# Patient Record
Sex: FEMALE | Race: WHITE | Employment: OTHER | URBAN - METROPOLITAN AREA
[De-identification: names, ages, dates, MRNs, and addresses within clinical notes are randomized per-mention and may not be internally consistent; named-entity substitution may affect disease eponyms.]

---

## 2017-01-24 ENCOUNTER — OFFICE VISIT (OUTPATIENT)
Dept: OBGYN | Facility: CLINIC | Age: 47
End: 2017-01-24
Payer: COMMERCIAL

## 2017-01-24 ENCOUNTER — RADIANT APPOINTMENT (OUTPATIENT)
Dept: MAMMOGRAPHY | Facility: CLINIC | Age: 47
End: 2017-01-24
Payer: COMMERCIAL

## 2017-01-24 VITALS
HEIGHT: 72 IN | BODY MASS INDEX: 28.04 KG/M2 | WEIGHT: 207 LBS | SYSTOLIC BLOOD PRESSURE: 122 MMHG | DIASTOLIC BLOOD PRESSURE: 74 MMHG

## 2017-01-24 DIAGNOSIS — Z12.31 VISIT FOR SCREENING MAMMOGRAM: ICD-10-CM

## 2017-01-24 DIAGNOSIS — Z01.419 ENCOUNTER FOR GYNECOLOGICAL EXAMINATION WITHOUT ABNORMAL FINDING: Primary | ICD-10-CM

## 2017-01-24 DIAGNOSIS — N92.0 MENORRHAGIA WITH REGULAR CYCLE: ICD-10-CM

## 2017-01-24 PROCEDURE — G0202 SCR MAMMO BI INCL CAD: HCPCS | Mod: TC

## 2017-01-24 PROCEDURE — 99396 PREV VISIT EST AGE 40-64: CPT | Performed by: NURSE PRACTITIONER

## 2017-01-24 PROCEDURE — G0145 SCR C/V CYTO,THINLAYER,RESCR: HCPCS | Performed by: NURSE PRACTITIONER

## 2017-01-24 ASSESSMENT — ANXIETY QUESTIONNAIRES
3. WORRYING TOO MUCH ABOUT DIFFERENT THINGS: NOT AT ALL
6. BECOMING EASILY ANNOYED OR IRRITABLE: NOT AT ALL
7. FEELING AFRAID AS IF SOMETHING AWFUL MIGHT HAPPEN: NOT AT ALL
GAD7 TOTAL SCORE: 0
IF YOU CHECKED OFF ANY PROBLEMS ON THIS QUESTIONNAIRE, HOW DIFFICULT HAVE THESE PROBLEMS MADE IT FOR YOU TO DO YOUR WORK, TAKE CARE OF THINGS AT HOME, OR GET ALONG WITH OTHER PEOPLE: NOT DIFFICULT AT ALL
1. FEELING NERVOUS, ANXIOUS, OR ON EDGE: NOT AT ALL
5. BEING SO RESTLESS THAT IT IS HARD TO SIT STILL: NOT AT ALL
2. NOT BEING ABLE TO STOP OR CONTROL WORRYING: NOT AT ALL

## 2017-01-24 ASSESSMENT — PATIENT HEALTH QUESTIONNAIRE - PHQ9: 5. POOR APPETITE OR OVEREATING: NOT AT ALL

## 2017-01-24 NOTE — PROGRESS NOTES
Antoinette is a 46 year [id number] female who presents for annual exam.     Besides routine health maintenance, she would like to discuss heavy periods which started in , at one point was on OCP for cycle regulation. And patient has been having more back pain, unsure if associated with periods.    HPI: here for annual exam, has had heavy  Periods since . Doesn't think she soaks a pad in less than hour, but can be as little as 2 hours.  Does not want to do OCP's.      The patient's PCP is Dr Erick Mckeon MD.    GYNECOLOGIC HISTORY:    Patient's last menstrual period was 01/15/2017.  Her current contraception method is: tubal ligation.  She  reports that she has never smoked. She has never used smokeless tobacco.  Patient is sexually active.  STD testing offered?  Declined    Last PHQ-9 score on record =   PHQ-9 SCORE 2017   Total Score 1     Last GAD7 score on record =   ARACELI-7 SCORE 2017   Total Score 0     Alcohol Score = 2    HEALTH MAINTENANCE:  Cholesterol: followed by primary care provider  CHOLESTEROL   Date Value Ref Range Status   06/10/2015 135 100 - 200 mg/dL Final   Last Mammo: 2015, Result: normal, Next Mammo: will try to schedule today  Pap: Negative 2015  Colonoscopy:  N/A, Result: not applicable, Next Colonoscopy: age 49  Dexa:  Never  Health maintenance updated:  yes    HISTORY:  Obstetric History     No data available          Patient Active Problem List   Diagnosis     Hypothyroidism     Migraine with aura     Past Surgical History   Procedure Laterality Date     [place]     [facility] [place]      Social History   Substance Use Topics     Smoking status: Never Smoker      Smokeless tobacco: Never Used     Alcohol Use: 0.0 oz/week     0 Standard drinks or equivalent per week      Problem (# of Occurrences) Relation (Name,Age of Onset)    Hypertension (1) Father    Type 2 Diabetes (1) Father            Current Outpatient Prescriptions   Medication Sig      "promethazine (PHENERGAN) 25 MG tablet Take 25 mg by mouth every 6 hours as needed for nausea     Levothyroxine Sodium (SYNTHROID PO) Take 12.5 mcg by mouth     Liothyronine Sodium (CYTOMEL PO) Take 5 mcg by mouth Take two tablets daily     Ascorbic Acid (VITAMIN C) 500 MG CAPS Take 1,000 mg by mouth     multivitamin, therapeutic with minerals (MULTI-VITAMIN) TABS      cholecalciferol (VITAMIN D) 1000 UNIT tablet Take 1,000 Units by mouth     SUMAtriptan (IMITREX) 25 MG tablet Take 1-2 tablets (25-50 mg) by mouth at onset of headache for migraine May repeat in 2 hours. Max 8 tablets/24 hours.     No current facility-administered medications for this visit.     No Known Allergies    Past medical, surgical, social and family histories were reviewed and updated in EPIC.    ROS:   12 point review of systems negative other than symptoms noted below.  Genitourinary: Heavy Bleeding with Period    EXAM:  /74 mmHg  Ht 5' 11.5\" (1.816 m)  Wt 207 lb (93.895 kg)  BMI 28.47 kg/m2  LMP 01/15/2017   BMI: Body mass index is 28.47 kg/(m^2).    PHYSICAL EXAM:  Constitutional:  Appearance: Well nourished, well developed, alert, in no acute distress  Neck:  Lymph Nodes:  No lymphadenopathy present    Thyroid:  Gland size normal, nontender, no nodules or masses present  on palpation  Chest:  Respiratory Effort:  Breathing unlabored  Cardiovascular:    Heart: Auscultation:  Regular rate, normal rhythm, no murmurs present  Breasts: Inspection of Breasts:  No lymphadenopathy present    Palpation of Breasts and Axillae:  No masses present on palpation, no  breast tenderness    Axillary Lymph Nodes:  No lymphadenopathy present  Gastrointestinal:   Abdominal Examination:  Abdomen nontender to palpation, tone normal without rigidity or guarding, no masses present, umbilicus without lesions   Liver and Spleen:  No hepatomegaly present, liver nontender to palpation    Hernias:  No hernias present  Lymphatic: Lymph Nodes:  No other " lymphadenopathy present  Skin:  General Inspection:  No rashes present, no lesions present, no areas of  discoloration    Genitalia and Groin:  No rashes present, no lesions present, no areas of  discoloration, no masses present  Neurologic/Psychiatric:    Mental Status:  Oriented X3     Pelvic Exam:  External Genitalia:     Normal appearance for age, no discharge present, no tenderness present, no inflammatory lesions present, color normal  Vagina:     Normal vaginal vault without central or paravaginal defects, no discharge present, no inflammatory lesions present, no masses present  Bladder:     Nontender to palpation  Urethra:   Urethral Body:  Urethra palpation normal, urethra structural support normal   Urethral Meatus:  No erythema or lesions present  Cervix:     Appearance healthy, no lesions present, nontender to palpation, no bleeding present  Uterus:     Nontender to palpation, no masses present, position mid, mobility: normal  Adnexa:     No adnexal tenderness present, no adnexal masses present  Perineum:     Perineum within normal limits, no evidence of trauma, no rashes or skin lesions present  Anus:     Anus within normal limits, no hemorrhoids present  Inguinal Lymph Nodes:     No lymphadenopathy present  Pubic Hair:     Normal pubic hair distribution for age  Genitalia and Groin:     No rashes present, no lesions present, no areas of discoloration, no masses present    COUNSELING:   Reviewed preventive health counseling, as reflected in patient instructions       Regular exercise       Healthy diet/nutrition       (Suni)menopause management    BMI: Body mass index is 28.47 kg/(m^2).     We discussed novasure ablation vs. OCP's for heavy bleeding.  Patient was very interested.  Phamplet given.      ASSESSMENT:  46 year old female with satisfactory annual exam.    ICD-10-CM    1. Encounter for gynecological examination without abnormal finding [Z01.419] Z01.419 Pap imaged thin layer screen reflex to  HPV if ASCUS - recommended age 25 - 29 years   2. Menorrhagia with regular cycle N92.0 US Transvaginal Non OB       PLAN:  Normal Gyn exam.  Will return for pelvic US and endo bx with Dr. Locke, if decides to do Novasure ablation.  Blood work with primary.    PHILOMENA Borjas CNP

## 2017-01-24 NOTE — MR AVS SNAPSHOT
After Visit Summary   1/24/2017    Antoinette Deleon    MRN: [id number]           Patient Information     Date Of Birth          1970        Visit Information        Provider Department      1/24/2017 9:00 AM Bertha Nunez APRN CNP [facility]        Today's Diagnoses     Encounter for gynecological examination without abnormal finding [Z01.419]    -  1     Menorrhagia with regular cycle            Follow-ups after your visit        Follow-up notes from your care team     Return in about 1 year (around 1/24/2018) for Routine Visit.      Future tests that were ordered for you today     Open Future Orders        Priority Expected Expires Ordered    US Transvaginal Non OB Routine  1/25/2018 1/24/2017            Who to contact     If you have questions or need follow up information about today's clinic visit or your schedule please contact [facility] directly at [phone].  Normal or non-critical lab and imaging results will be communicated to you by MyChart, letter or phone within 4 business days after the clinic has received the results. If you do not hear from us within 7 days, please contact the clinic through [employer] or phone. If you have a critical or abnormal lab result, we will notify you by phone as soon as possible.  Submit refill requests through Chromatik or call your pharmacy and they will forward the refill request to us. Please allow 3 business days for your refill to be completed.          Additional Information About Your Visit        MyChart Information     Chromatik gives you secure access to your electronic health record. If you see a primary care provider, you can also send messages to your care team and make appointments. If you have questions, please call your primary care clinic.  If you do not have a primary care provider, please call [phone] and they will assist you.        Care EveryWhere ID     This is your Care  "EveryWhere ID. This could be used by other organizations to access your [place] medical records  [id number]        Your Vitals Were     Height BMI (Body Mass Index) Last Period             5' 11.5\" (1.816 m) 28.47 kg/m2 01/15/2017          Blood Pressure from Last 3 Encounters:   01/24/17 122/74   09/26/16 126/80    Weight from Last 3 Encounters:   01/24/17 207 lb (93.895 kg)   09/26/16 203 lb (92.08 kg)              We Performed the Following     Pap imaged thin layer screen reflex to HPV if ASCUS - recommended age 25 - 29 years        Primary Care Provider Office Phone # Fax #    Erick Mckeon [phone]       [facility] [address] [facility] [address]  [facility] [address]        Thank you!     Thank you for choosing [facility]  for your care. Our goal is always to provide you with excellent care. Hearing back from our patients is one way we can continue to improve our services. Please take a few minutes to complete the written survey that you may receive in the mail after your visit with us. Thank you!             Your Updated Medication List - Protect others around you: Learn how to safely use, store and throw away your medicines at www.disposemymeds.org.          This list is accurate as of: 1/24/17  9:39 AM.  Always use your most recent med list.                   Brand Name Dispense Instructions for use    cholecalciferol 1000 UNIT tablet    vitamin D     Take 1,000 Units by mouth       CYTOMEL PO      Take 5 mcg by mouth Take two tablets daily       Multi-vitamin Tabs tablet          promethazine 25 MG tablet    PHENERGAN     Take 25 mg by mouth every 6 hours as needed for nausea       SUMAtriptan 25 MG tablet    IMITREX    18 tablet    Take 1-2 tablets (25-50 mg) by mouth at onset of headache for migraine May repeat in 2 hours. Max 8 tablets/24 hours.       SYNTHROID PO      Take 12.5 mcg by mouth       Vitamin C 500 MG Caps      Take 1,000 mg by mouth    "

## 2017-01-24 NOTE — Clinical Note
[facility]  [address] [place] [address] , [address]  [place]   [phone]      1/30/2017     Antoinette Deleon   [address]   [place] [address]      Dear Antoinette,  We are happy to inform you that your PAP smear result is normal.  We are now able to do a follow up test on PAP smears. The DNA test is for HPV (Human Papilloma Virus). Cervical cancer is closely linked with certain types of HPV. Your result showed no evidence of HPV.  Therefore we recommend you return in 1 year for your next pap smear.  You will still need to return to the clinic every year for an annual exam and other preventive tests.  Please contact the clinic with any questions.  Sincerely,  Elissa Nunez RNC

## 2017-01-25 ASSESSMENT — PATIENT HEALTH QUESTIONNAIRE - PHQ9: SUM OF ALL RESPONSES TO PHQ QUESTIONS 1-9: 1

## 2017-01-25 ASSESSMENT — ANXIETY QUESTIONNAIRES: GAD7 TOTAL SCORE: 0

## 2017-01-26 LAB
COPATH REPORT: NORMAL
PAP: NORMAL

## 2017-02-17 ENCOUNTER — TELEPHONE (OUTPATIENT)
Dept: FAMILY MEDICINE | Facility: CLINIC | Age: 47
End: 2017-02-17

## 2017-02-17 NOTE — TELEPHONE ENCOUNTER
Pharmacy stating PA is needed for the sumatriptan. Formulary shows this medication is approved for up to 9 tablets per 30 days. I called [employer] to review the rejection, and they note that the medication should go through just fine at 9 tablets per 30 days but that there is a coding issue they need to fix on their end for the sumatriptan to go through at the pharmacy. Anyway, [employer] said they will be working on the coding issue and will then call the pharmacy to let them know when it can be processed through without issues.    No further action needed by clinic.    Luis F Garcia, [facility]

## 2017-04-07 ENCOUNTER — MYC MEDICAL ADVICE (OUTPATIENT)
Dept: OBGYN | Facility: CLINIC | Age: 47
End: 2017-04-07

## 2017-04-10 NOTE — TELEPHONE ENCOUNTER
I sent a message back to patient in [facility] with follow up concerning abation.  LUIS A Giles

## 2017-06-05 ENCOUNTER — RADIANT APPOINTMENT (OUTPATIENT)
Dept: GENERAL RADIOLOGY | Facility: CLINIC | Age: 47
End: 2017-06-05
Attending: FAMILY MEDICINE
Payer: COMMERCIAL

## 2017-06-05 ENCOUNTER — OFFICE VISIT (OUTPATIENT)
Dept: URGENT CARE | Facility: URGENT CARE | Age: 47
End: 2017-06-05
Payer: COMMERCIAL

## 2017-06-05 VITALS
DIASTOLIC BLOOD PRESSURE: 73 MMHG | HEART RATE: 87 BPM | WEIGHT: 205 LBS | BODY MASS INDEX: 28.19 KG/M2 | TEMPERATURE: 97 F | SYSTOLIC BLOOD PRESSURE: 117 MMHG | OXYGEN SATURATION: 96 %

## 2017-06-05 DIAGNOSIS — M25.571 ACUTE RIGHT ANKLE PAIN: ICD-10-CM

## 2017-06-05 DIAGNOSIS — M25.571 ACUTE RIGHT ANKLE PAIN: Primary | ICD-10-CM

## 2017-06-05 PROCEDURE — 99213 OFFICE O/P EST LOW 20 MIN: CPT | Performed by: FAMILY MEDICINE

## 2017-06-05 PROCEDURE — 73610 X-RAY EXAM OF ANKLE: CPT | Mod: RT

## 2017-06-05 NOTE — MR AVS SNAPSHOT
After Visit Summary   6/5/2017    Antoinette Deleon    MRN: [id number]           Patient Information     Date Of Birth          1970        Visit Information        Provider Department      6/5/2017 5:00 PM Juan Mtz MD [facility]        Today's Diagnoses     Acute right ankle pain    -  1       Follow-ups after your visit        Who to contact     If you have questions or need follow up information about today's clinic visit or your schedule please contact [facility] directly at [phone].  Normal or non-critical lab and imaging results will be communicated to you by [employer], letter or phone within 4 business days after the clinic has received the results. If you do not hear from us within 7 days, please contact the clinic through [employer] or phone. If you have a critical or abnormal lab result, we will notify you by phone as soon as possible.  Submit refill requests through [employer]i Connect or call your pharmacy and they will forward the refill request to us. Please allow 3 business days for your refill to be completed.          Additional Information About Your Visit        MyChart Information     [employer] Connect gives you secure access to your electronic health record. If you see a primary care provider, you can also send messages to your care team and make appointments. If you have questions, please call your primary care clinic.  If you do not have a primary care provider, please call [phone] and they will assist you.        Care EveryWhere ID     This is your Care EveryWhere ID. This could be used by other organizations to access your [place] medical records  [id number]        Your Vitals Were     Pulse Temperature Pulse Oximetry BMI (Body Mass Index)          87 97  F (36.1  C) (Oral) 96% 28.19 kg/m2         Blood Pressure from Last 3 Encounters:   06/05/17 117/73   01/24/17 122/74   09/26/16 126/80    Weight from Last 3 Encounters:    06/05/17 205 lb (93 kg)   01/24/17 207 lb (93.9 kg)   09/26/16 203 lb (92.1 kg)               Primary Care Provider Office Phone # Fax #    Erick Mckeon [phone]       [facility] [address] [place] [address]  [facility] [address]        Thank you!     Thank you for choosing [facility]  for your care. Our goal is always to provide you with excellent care. Hearing back from our patients is one way we can continue to improve our services. Please take a few minutes to complete the written survey that you may receive in the mail after your visit with us. Thank you!             Your Updated Medication List - Protect others around you: Learn how to safely use, store and throw away your medicines at www.disposemymeds.org.          This list is accurate as of: 6/5/17 11:59 PM.  Always use your most recent med list.                   Brand Name Dispense Instructions for use    cholecalciferol 1000 UNIT tablet    vitamin D     Take 1,000 Units by mouth       CYTOMEL PO      Take 5 mcg by mouth Take two tablets daily       Multi-vitamin Tabs tablet          promethazine 25 MG tablet    PHENERGAN     Take 25 mg by mouth every 6 hours as needed for nausea       SUMAtriptan 25 MG tablet    IMITREX    18 tablet    Take 1-2 tablets (25-50 mg) by mouth at onset of headache for migraine May repeat in 2 hours. Max 8 tablets/24 hours.       SYNTHROID PO      Take 12.5 mcg by mouth       Vitamin C 500 MG Caps      Take 1,000 mg by mouth

## 2017-06-05 NOTE — PROGRESS NOTES
SUBJECTIVE  Antoinette Deleon is a 46 year old female who presents today with right ankle pain that occurred 1 week(s) ago.    The mechanism of injury includes: twisted. Pain was sudden onset and moderate  Therapies to improve symptoms include: ice  History of recurrent ankle injuries: no  Pain is improved since onset.  Aggravating factors: weight-bearing, stairs and driving, Relieved byrest and ice.    No past medical history on file.  Current Outpatient Prescriptions   Medication Sig Dispense Refill     Levothyroxine Sodium (SYNTHROID PO) Take 12.5 mcg by mouth       Liothyronine Sodium (CYTOMEL PO) Take 5 mcg by mouth Take two tablets daily       Ascorbic Acid (VITAMIN C) 500 MG CAPS Take 1,000 mg by mouth       multivitamin, therapeutic with minerals (MULTI-VITAMIN) TABS        cholecalciferol (VITAMIN D) 1000 UNIT tablet Take 1,000 Units by mouth       promethazine (PHENERGAN) 25 MG tablet Take 25 mg by mouth every 6 hours as needed for nausea       SUMAtriptan (IMITREX) 25 MG tablet Take 1-2 tablets (25-50 mg) by mouth at onset of headache for migraine May repeat in 2 hours. Max 8 tablets/24 hours. (Patient not taking: Reported on 6/5/2017) 18 tablet 11     Social History   Substance Use Topics     Smoking status: Never Smoker     Smokeless tobacco: Never Used     Alcohol use 0.0 oz/week     0 Standard drinks or equivalent per week       ROS:  CONSTITUTIONAL:NEGATIVE for fever, chills, change in weight  INTEGUMENTARY/SKIN: NEGATIVE for worrisome rashes, moles or lesions    OBJECTIVE:  /73  Pulse 87  Temp 97  F (36.1  C) (Oral)  Wt 205 lb (93 kg)  SpO2 96%  BMI 28.19 kg/m2     EXAM: Patient appears alert,no apparent distress.  Ankle Exam: right    Inspection: swelling around the lateral malleolus    Palpation: tender over lateral malleolus    Both doralis pedis and posterior tibial pulses intact     Special Maneuvers: Pain with inversion  Neuro:Normal strength and tone, sensory exam grossly  normal    X-Ray: offical reading pending,normal mortise, no loose bodies, no fractures seen    ASSESSMENT:    1. Acute right ankle pain  Symptomatic cares were discussed in detail.   Pt instructed to come back to the clinic for worsening sx    - XR Ankle Right G/E 3 Views; Future

## 2017-06-05 NOTE — NURSING NOTE
"Chief Complaint   Patient presents with     Urgent Care     Musculoskeletal Problem     right ankle injury last wednesday/swollen,bruised,driving painful        Initial /73  Pulse 87  Temp 97  F (36.1  C) (Oral)  Wt 205 lb (93 kg)  SpO2 96%  BMI 28.19 kg/m2 Estimated body mass index is 28.19 kg/(m^2) as calculated from the following:    Height as of 1/24/17: 5' 11.5\" (1.816 m).    Weight as of this encounter: 205 lb (93 kg).  Medication Reconciliation: complete   Neha LYONS MA       "

## 2017-10-30 ENCOUNTER — OFFICE VISIT (OUTPATIENT)
Dept: OBGYN | Facility: CLINIC | Age: 47
End: 2017-10-30
Attending: NURSE PRACTITIONER
Payer: COMMERCIAL

## 2017-10-30 ENCOUNTER — RADIANT APPOINTMENT (OUTPATIENT)
Dept: ULTRASOUND IMAGING | Facility: CLINIC | Age: 47
End: 2017-10-30
Attending: NURSE PRACTITIONER
Payer: COMMERCIAL

## 2017-10-30 DIAGNOSIS — N92.0 MENORRHAGIA WITH REGULAR CYCLE: ICD-10-CM

## 2017-10-30 DIAGNOSIS — D25.1 INTRAMURAL LEIOMYOMA OF UTERUS: ICD-10-CM

## 2017-10-30 DIAGNOSIS — N92.0 MENORRHAGIA WITH REGULAR CYCLE: Primary | ICD-10-CM

## 2017-10-30 PROCEDURE — 88305 TISSUE EXAM BY PATHOLOGIST: CPT | Performed by: OBSTETRICS & GYNECOLOGY

## 2017-10-30 PROCEDURE — 58558 HYSTEROSCOPY BIOPSY: CPT | Performed by: OBSTETRICS & GYNECOLOGY

## 2017-10-30 PROCEDURE — 76830 TRANSVAGINAL US NON-OB: CPT | Performed by: OBSTETRICS & GYNECOLOGY

## 2017-10-30 NOTE — MR AVS SNAPSHOT
After Visit Summary   10/30/2017    Antoinette Deleon    MRN: [id number]           Patient Information     Date Of Birth          1970        Visit Information        Provider Department      10/30/2017 2:00 PM Bertha Nunez APRN CNP; Josias Locke MD; [facility] [place]        Today's Diagnoses     Menorrhagia with regular cycle    -  1    Intramural leiomyoma of uterus           Follow-ups after your visit        Who to contact     If you have questions or need follow up information about today's clinic visit or your schedule please contact [facility] [place] directly at [phone].  Normal or non-critical lab and imaging results will be communicated to you by MyChart, letter or phone within 4 business days after the clinic has received the results. If you do not hear from us within 7 days, please contact the clinic through [employer]hart or phone. If you have a critical or abnormal lab result, we will notify you by phone as soon as possible.  Submit refill requests through TeeBeeDee or call your pharmacy and they will forward the refill request to us. Please allow 3 business days for your refill to be completed.          Additional Information About Your Visit        MyChart Information     TeeBeeDee gives you secure access to your electronic health record. If you see a primary care provider, you can also send messages to your care team and make appointments. If you have questions, please call your primary care clinic.  If you do not have a primary care provider, please call [phone] and they will assist you.        Care EveryWhere ID     This is your Care EveryWhere ID. This could be used by other organizations to access your [place] medical records  [id number]         Blood Pressure from Last 3 Encounters:   06/05/17 117/73   01/24/17 122/74   09/26/16 126/80    Weight from Last 3 Encounters:   06/05/17 205 lb (93 kg)   01/24/17 207  lb (93.9 kg)   09/26/16 203 lb (92.1 kg)              We Performed the Following     HYSTEROSCOPY W ENDOMETRIAL BX/POLYPECTOMY W/WO D&C     Surgical pathology exam        Primary Care Provider Office Phone # Fax #    Erick Mckeon [phone] [address] [place]  [place] [address]        Equal Access to Services     [facility] Center[facility]ICOLE : Hadii aad ku hadasho Soomaali, waaxda luqadaha, qaybta kaalmada adeegyada, waxay idiin hayaan adeeg kharash la'aan . So [facility] [phone].    ATENCIÓN: Si kathyla eyad, tiene a salcedo disposición servicios gratuitos de asistencia lingüística. Bethanieame al [phone].    We comply with applicable federal civil rights laws and [place] laws. We do not discriminate on the basis of race, color, national origin, age, disability, sex, sexual orientation, or gender identity.            Thank you!     Thank you for choosing [facility] [place]  for your care. Our goal is always to provide you with excellent care. Hearing back from our patients is one way we can continue to improve our services. Please take a few minutes to complete the written survey that you may receive in the mail after your visit with us. Thank you!             Your Updated Medication List - Protect others around you: Learn how to safely use, store and throw away your medicines at www.disposemymeds.org.          This list is accurate as of: 10/30/17  3:05 PM.  Always use your most recent med list.                   Brand Name Dispense Instructions for use Diagnosis    cholecalciferol 1000 UNIT tablet    vitamin D3     Take 1,000 Units by mouth        CYTOMEL PO      Take 5 mcg by mouth Take two tablets daily        Multi-vitamin Tabs tablet           promethazine 25 MG tablet    PHENERGAN     Take 25 mg by mouth every 6 hours as needed for nausea        SUMAtriptan 25 MG tablet    IMITREX    18 tablet    Take 1-2 tablets (25-50 mg) by mouth at onset of headache for migraine May  repeat in 2 hours. Max 8 tablets/24 hours.    Migraine with aura and without status migrainosus, not intractable       SYNTHROID PO      Take 12.5 mcg by mouth        Vitamin C 500 MG Caps      Take 1,000 mg by mouth

## 2017-10-30 NOTE — PROGRESS NOTES
Antoinette is here for evaluation of menorrhagia.  She states that she is always had very heavy menses.  She is interested in an endometrial ablation.  Her ultrasound exam today did show a 3 cm anterior cervical myoma, which was not impinging on the cavity.  The uterus is retroflexed.  The hysteroscopy procedure has been discussed with the patient as well as the risks, benefits and complications. She agrees to the procedure. Consent form signed.    Results for orders placed or performed in visit on 10/30/17   US Transvaginal Non OB    Narrative    Gynecological Ultrasonography:   Uterus: retroverted  Size: 7.08 x 5.77 x 5.71cm  Findings: Single subserosal fibroid at cervical area measuring 2.5 x   3.0cm. Uterus appears otherwise normal   Endometrium: Thickness total 13.67mm  Findings: Appears normal  Right Ovary: 3.17 x 2.07 x 2.80cm   Left Ovary: 4.53 x 2.74 x 2.51cm  Cul de Sac/Pouch of Indra: no ff      Impression: Subserosal 3,0 cm anterior cervical myoma    Josias Locke MD            Pause for the cause completed. Josias Locke    Patient was placed in lithotomy position.  Cervix was dilated to 4mm, the hysterocope was then placed and the uterine cavity explored.     Findings include the uterine cavity was smooth and regular.  Both tubal ostia were well seen.  The uterus sounded to 8 cm.  On examining the endocervix everything looked normal.  There is no evidence of a myoma that was seen and endocervical side.  A myoma cannot be palpated on the anterior cervix on bimanual exam.  Biopsies endometrial.     Antoinette  tolerated the procedure well.     Patient be called to schedule an office endometrial ablation when we have her next in office schedule.    Josias Locke MD

## 2017-11-01 DIAGNOSIS — N88.2 CERVICAL STENOSIS (UTERINE CERVIX): Primary | ICD-10-CM

## 2017-11-01 LAB — COPATH REPORT: NORMAL

## 2017-11-01 RX ORDER — MISOPROSTOL 200 UG/1
200 TABLET ORAL ONCE
Qty: 1 TABLET | Refills: 0 | Status: SHIPPED | OUTPATIENT
Start: 2017-11-01 | End: 2017-11-01

## 2017-11-06 ENCOUNTER — OFFICE VISIT (OUTPATIENT)
Dept: FAMILY MEDICINE | Facility: CLINIC | Age: 47
End: 2017-11-06
Payer: COMMERCIAL

## 2017-11-06 VITALS
HEIGHT: 72 IN | OXYGEN SATURATION: 99 % | DIASTOLIC BLOOD PRESSURE: 74 MMHG | WEIGHT: 203 LBS | RESPIRATION RATE: 16 BRPM | TEMPERATURE: 98.9 F | BODY MASS INDEX: 27.5 KG/M2 | SYSTOLIC BLOOD PRESSURE: 118 MMHG | HEART RATE: 88 BPM

## 2017-11-06 DIAGNOSIS — J20.9 ACUTE BRONCHITIS, UNSPECIFIED ORGANISM: Primary | ICD-10-CM

## 2017-11-06 DIAGNOSIS — Z23 NEED FOR PROPHYLACTIC VACCINATION AND INOCULATION AGAINST INFLUENZA: ICD-10-CM

## 2017-11-06 PROCEDURE — 99213 OFFICE O/P EST LOW 20 MIN: CPT | Mod: 25 | Performed by: INTERNAL MEDICINE

## 2017-11-06 PROCEDURE — 90471 IMMUNIZATION ADMIN: CPT | Performed by: INTERNAL MEDICINE

## 2017-11-06 PROCEDURE — 90686 IIV4 VACC NO PRSV 0.5 ML IM: CPT | Performed by: INTERNAL MEDICINE

## 2017-11-06 RX ORDER — ALBUTEROL SULFATE 90 UG/1
2 AEROSOL, METERED RESPIRATORY (INHALATION) EVERY 6 HOURS PRN
Qty: 1 INHALER | Refills: 1 | Status: SHIPPED | OUTPATIENT
Start: 2017-11-06 | End: 2019-07-29

## 2017-11-06 RX ORDER — CODEINE PHOSPHATE AND GUAIFENESIN 10; 100 MG/5ML; MG/5ML
1 SOLUTION ORAL EVERY 4 HOURS PRN
Qty: 120 ML | Refills: 0 | Status: SHIPPED | OUTPATIENT
Start: 2017-11-06 | End: 2018-01-09

## 2017-11-06 RX ORDER — PREDNISONE 20 MG/1
20 TABLET ORAL DAILY
Qty: 5 TABLET | Refills: 0 | Status: SHIPPED | OUTPATIENT
Start: 2017-11-06 | End: 2018-01-09

## 2017-11-06 NOTE — MR AVS SNAPSHOT
"              After Visit Summary   11/6/2017    Antoinette Deleon    MRN: [id number]           Patient Information     Date Of Birth          1970        Visit Information        Provider Department      11/6/2017 10:20 AM Cindi Cho MD [facility] [place]        Today's Diagnoses     Acute bronchitis, unspecified organism    -  1    Need for prophylactic vaccination and inoculation against influenza           Follow-ups after your visit        Who to contact     If you have questions or need follow up information about today's clinic visit or your schedule please contact [facility] [place] directly at [phone].  Normal or non-critical lab and imaging results will be communicated to you by [employer], letter or phone within 4 business days after the clinic has received the results. If you do not hear from us within 7 days, please contact the clinic through [employer] or phone. If you have a critical or abnormal lab result, we will notify you by phone as soon as possible.  Submit refill requests through flux - [employer]inity or call your pharmacy and they will forward the refill request to us. Please allow 3 business days for your refill to be completed.          Additional Information About Your Visit        MyChart Information     [employer] gives you secure access to your electronic health record. If you see a primary care provider, you can also send messages to your care team and make appointments. If you have questions, please call your primary care clinic.  If you do not have a primary care provider, please call [phone] and they will assist you.        Care EveryWhere ID     This is your Care EveryWhere ID. This could be used by other organizations to access your [place] medical records  [id number]        Your Vitals Were     Pulse Temperature Respirations Height Pulse Oximetry BMI (Body Mass Index)    88 98.9  F (37.2  C) 16 5' 11.5\" (1.816 m) 99% 27.92 kg/m2       Blood Pressure " from Last 3 Encounters:   11/06/17 118/74   06/05/17 117/73   01/24/17 122/74    Weight from Last 3 Encounters:   11/06/17 203 lb (92.1 kg)   06/05/17 205 lb (93 kg)   01/24/17 207 lb (93.9 kg)              We Performed the Following     FLU VAC, SPLIT VIRUS IM > 3 YO (QUADRIVALENT) [62855]     Vaccine Administration, Initial [17480]          Today's Medication Changes          These changes are accurate as of: 11/6/17 10:56 AM.  If you have any questions, ask your nurse or doctor.               Start taking these medicines.        Dose/Directions    albuterol 108 (90 BASE) MCG/ACT Inhaler   Commonly known as:  PROAIR HFA/PROVENTIL HFA/VENTOLIN HFA   Used for:  Acute bronchitis, unspecified organism   Started by:  Cindi Cho MD        Dose:  2 puff   Inhale 2 puffs into the lungs every 6 hours as needed for shortness of breath / dyspnea or wheezing   Quantity:  1 Inhaler   Refills:  1       guaiFENesin-codeine 100-10 MG/5ML Soln solution   Commonly known as:  ROBITUSSIN AC   Used for:  Acute bronchitis, unspecified organism   Started by:  Cindi Cho MD        Dose:  1 tsp.   Take 5 mLs by mouth every 4 hours as needed for cough   Quantity:  120 mL   Refills:  0       predniSONE 20 MG tablet   Commonly known as:  DELTASONE   Used for:  Acute bronchitis, unspecified organism   Started by:  Cindi Cho MD        Dose:  20 mg   Take 1 tablet (20 mg) by mouth daily   Quantity:  5 tablet   Refills:  0            Where to get your medicines      These medications were sent to [facility] [place]  [place], [place] - [address] [place] [address]  [address] [facility] [address]     Phone:  [phone]     albuterol 108 (90 BASE) MCG/ACT Inhaler    predniSONE 20 MG tablet         Some of these will need a paper prescription and others can be bought over the counter.  Ask your nurse if you have questions.     Bring a paper prescription for each of these medications      guaiFENesin-codeine 100-10 MG/5ML Soln solution                Primary Care Provider Office Phone # Fax #    Erick Mckeon [phone] [address]  [facility] [address]        Equal Access to Services     [facility]: Hadii aad ku hadasho Soomaali, waaxda luqadaha, qaybta kaalmada adeegyada, waxay idiin hayaan adeeg leah laYulissaaan . So [facility] [phone].    ATENCIÓN: Si habla español, tiene a salcedo disposición servicios gratuitos de asistencia lingüística. Yessenia al [phone].    We comply with applicable federal civil rights laws and [place] laws. We do not discriminate on the basis of race, color, national origin, age, disability, sex, sexual orientation, or gender identity.            Thank you!     Thank you for [facility]  [facility] your care. Our goal is always to provide you with excellent care. Hearing back from our patients is one way we can continue to improve our services. Please take a few minutes to complete the written survey that you may receive in the mail after your visit with us. Thank you!             Your Updated Medication List - Protect others around you: Learn how to safely use, store and throw away your medicines at www.disposemymeds.org.          This list is accurate as of: 11/6/17 10:56 AM.  Always use your most recent med list.                   Brand Name Dispense Instructions for use Diagnosis    albuterol 108 (90 BASE) MCG/ACT Inhaler    PROAIR HFA/PROVENTIL HFA/VENTOLIN HFA    1 Inhaler    Inhale 2 puffs into the lungs every 6 hours as needed for shortness of breath / dyspnea or wheezing    Acute bronchitis, unspecified organism       cholecalciferol 1000 UNIT tablet    vitamin D3     Take 1,000 Units by mouth        CYTOMEL PO      Take 5 mcg by mouth Take two tablets daily        guaiFENesin-codeine 100-10 MG/5ML Soln solution    ROBITUSSIN AC    120 mL    Take 5 mLs by mouth every 4 hours as needed for cough     Acute bronchitis, unspecified organism       Multi-vitamin Tabs tablet           predniSONE 20 MG tablet    DELTASONE    5 tablet    Take 1 tablet (20 mg) by mouth daily    Acute bronchitis, unspecified organism       promethazine 25 MG tablet    PHENERGAN     Take 25 mg by mouth every 6 hours as needed for nausea        SUMAtriptan 25 MG tablet    IMITREX    18 tablet    Take 1-2 tablets (25-50 mg) by mouth at onset of headache for migraine May repeat in 2 hours. Max 8 tablets/24 hours.    Migraine with aura and without status migrainosus, not intractable       SYNTHROID PO      Take 12.5 mcg by mouth        Vitamin C 500 MG Caps      Take 1,000 mg by mouth

## 2017-11-06 NOTE — PROGRESS NOTES

## 2017-11-06 NOTE — PROGRESS NOTES
"Chief Complaint   Patient presents with     URI       Initial /74  Pulse 88  Temp 98.9  F (37.2  C)  Resp 16  Ht 5' 11.5\" (1.816 m)  Wt 203 lb (92.1 kg)  SpO2 99%  BMI 27.92 kg/m2 Estimated body mass index is 27.92 kg/(m^2) as calculated from the following:    Height as of this encounter: 5' 11.5\" (1.816 m).    Weight as of this encounter: 203 lb (92.1 kg).  Medication Reconciliation: complete. NATE Kohler LPN        SUBJECTIVE:   Antoinette Deleon is a 47 year old female who presents to clinic today for the following health issues:      Acute Illness   Acute illness concerns: uri  Onset: 1 week    Fever: no    Chills/Sweats: no    Headache (location?): no    Sinus Pressure:no    Conjunctivitis:  no    Ear Pain: no    Rhinorrhea: no    Congestion: YES    Sore Throat: no     Cough: YES - mostly dry    Wheeze: no    Decreased Appetite: no    Nausea: no    Vomiting: no    Diarrhea:  no    Dysuria/Freq.: no    Fatigue/Achiness: no    Sick/Strep Exposure: YES- children     Therapies Tried and outcome: mucinex  otc night time cold med    Deep cough that hasn't gone away. She has been using mucinex and a cough and cold medicine. She has used her kid's albuterol inhaler and this helped. She doesn't have a history of needing albuterol. Her kids all have had similar symptoms.       Problem list and histories reviewed & adjusted, as indicated.  Additional history: as documented    Patient Active Problem List   Diagnosis     Hypothyroidism     Migraine with aura     Past Surgical History:   Procedure Laterality Date     BLADDER SURGERY  08/2015    [facility]       Social History   Substance Use Topics     Smoking status: Never Smoker     Smokeless tobacco: Never Used     Alcohol use 0.0 oz/week     0 Standard drinks or equivalent per week     Family History   Problem Relation Age of Onset     Type 2 Diabetes Father      Hypertension Father              Reviewed and updated as needed this visit by clinical staffTobacco " " Allergies  Meds  Fam Hx  Soc Hx      Reviewed and updated as needed this visit by Provider         ROS:  Constitutional, HEENT, cardiovascular, pulmonary, gi and gu systems are negative, except as otherwise noted.      OBJECTIVE:   /74  Pulse 88  Temp 98.9  F (37.2  C)  Resp 16  Ht 5' 11.5\" (1.816 m)  Wt 203 lb (92.1 kg)  SpO2 99%  BMI 27.92 kg/m2  Body mass index is 27.92 kg/(m^2).  GENERAL: Healthy, alert, frequent coughing  EYES: Eyes grossly normal to inspection, PERRL and conjunctivae and sclerae normal  HENT: ear canals and TM's normal, nose and mouth without ulcers or lesions  NECK: no adenopathy, no asymmetry, masses, or scars and thyroid normal to palpation  RESP: lungs clear to auscultation - no rales, rhonchi or wheezes  CV: regular rate and rhythm, normal S1 S2, no S3 or S4, no murmur, click or rub, no peripheral edema and peripheral pulses strong    Diagnostic Test Results:  none     ASSESSMENT/PLAN:       1. Acute bronchitis, unspecified organism  - predniSONE (DELTASONE) 20 MG tablet; Take 1 tablet (20 mg) by mouth daily  Dispense: 5 tablet; Refill: 0  - albuterol (PROAIR HFA/PROVENTIL HFA/VENTOLIN HFA) 108 (90 BASE) MCG/ACT Inhaler; Inhale 2 puffs into the lungs every 6 hours as needed for shortness of breath / dyspnea or wheezing  Dispense: 1 Inhaler; Refill: 1  - guaiFENesin-codeine (ROBITUSSIN AC) 100-10 MG/5ML SOLN solution; Take 5 mLs by mouth every 4 hours as needed for cough  Dispense: 120 mL; Refill: 0    Follow up if no improvement in symptoms      Cindi Cho MD  [facility] [place] [place]      "

## 2017-11-10 ENCOUNTER — MYC MEDICAL ADVICE (OUTPATIENT)
Dept: FAMILY MEDICINE | Facility: CLINIC | Age: 47
End: 2017-11-10

## 2017-11-10 DIAGNOSIS — J01.00 ACUTE NON-RECURRENT MAXILLARY SINUSITIS: Primary | ICD-10-CM

## 2017-11-10 RX ORDER — DOXYCYCLINE 100 MG/1
100 CAPSULE ORAL 2 TIMES DAILY
Qty: 14 CAPSULE | Refills: 0 | Status: SHIPPED | OUTPATIENT
Start: 2017-11-10 | End: 2017-11-17

## 2017-11-10 NOTE — TELEPHONE ENCOUNTER
Please see My Chart message below and advise as appropriate.  Antoinette Velez RN - Triage  [facility]        11/6/2017 OV notes  1. Acute bronchitis, unspecified organism  - predniSONE (DELTASONE) 20 MG tablet; Take 1 tablet (20 mg) by mouth daily  Dispense: 5 tablet; Refill: 0  - albuterol (PROAIR HFA/PROVENTIL HFA/VENTOLIN HFA) 108 (90 BASE) MCG/ACT Inhaler; Inhale 2 puffs into the lungs every 6 hours as needed for shortness of breath / dyspnea or wheezing  Dispense: 1 Inhaler; Refill: 1  - guaiFENesin-codeine (ROBITUSSIN AC) 100-10 MG/5ML SOLN solution; Take 5 mLs by mouth every 4 hours as needed for cough  Dispense: 120 mL; Refill: 0     Follow up if no improvement in symptoms

## 2017-11-29 ENCOUNTER — OFFICE VISIT (OUTPATIENT)
Dept: OBGYN | Facility: CLINIC | Age: 47
End: 2017-11-29
Payer: COMMERCIAL

## 2017-11-29 DIAGNOSIS — G89.18 POST-OP PAIN: ICD-10-CM

## 2017-11-29 DIAGNOSIS — N92.1 MENORRHAGIA WITH IRREGULAR CYCLE: Primary | ICD-10-CM

## 2017-11-29 DIAGNOSIS — Z41.9 ELECTIVE SURGERY: ICD-10-CM

## 2017-11-29 DIAGNOSIS — N71.9 ENDOMETRITIS: ICD-10-CM

## 2017-11-29 LAB — BETA HCG QUAL IFA URINE: NEGATIVE

## 2017-11-29 PROCEDURE — 84703 CHORIONIC GONADOTROPIN ASSAY: CPT | Performed by: NURSE PRACTITIONER

## 2017-11-29 PROCEDURE — 58563 HYSTEROSCOPY ABLATION: CPT | Performed by: OBSTETRICS & GYNECOLOGY

## 2017-11-29 RX ORDER — DOXYCYCLINE 100 MG/1
100 CAPSULE ORAL 2 TIMES DAILY
Qty: 14 CAPSULE | Refills: 0 | Status: SHIPPED | OUTPATIENT
Start: 2017-11-29 | End: 2018-01-09

## 2017-11-29 RX ORDER — HYDROCODONE BITARTRATE AND ACETAMINOPHEN 5; 325 MG/1; MG/1
1 TABLET ORAL EVERY 4 HOURS PRN
Qty: 10 TABLET | Refills: 0 | Status: SHIPPED | OUTPATIENT
Start: 2017-11-29 | End: 2017-11-29

## 2017-11-29 RX ORDER — HYDROCODONE BITARTRATE AND ACETAMINOPHEN 5; 325 MG/1; MG/1
1-2 TABLET ORAL EVERY 4 HOURS PRN
Qty: 20 TABLET | Refills: 0 | Status: SHIPPED | OUTPATIENT
Start: 2017-11-29 | End: 2018-01-09

## 2017-11-29 NOTE — MR AVS SNAPSHOT
After Visit Summary   11/29/2017    Antoinette Deleon    MRN: [id number]           Patient Information     Date Of Birth          1970        Visit Information        Provider Department      11/29/2017 9:30 AM Bertha Nunez APRN CNP; Josias Locke MD; [facility]        Today's Diagnoses     Menorrhagia with irregular cycle    -  1    Elective surgery        Post-op pain        Endometritis           Follow-ups after your visit        Who to contact     If you have questions or need follow up information about today's clinic visit or your schedule please contact [facility] directly at [phone].  Normal or non-critical lab and imaging results will be communicated to you by MyChart, letter or phone within 4 business days after the clinic has received the results. If you do not hear from us within 7 days, please contact the clinic through [employer] or phone. If you have a critical or abnormal lab result, we will notify you by phone as soon as possible.  Submit refill requests through [employer] or call your pharmacy and they will forward the refill request to us. Please allow 3 business days for your refill to be completed.          Additional Information About Your Visit        MyChart Information     [employer] gives you secure access to your electronic health record. If you see a primary care provider, you can also send messages to your care team and make appointments. If you have questions, please call your primary care clinic.  If you do not have a primary care provider, please call [phone] and they will assist you.        Care EveryWhere ID     This is your Care EveryWhere ID. This could be used by other organizations to access your [facility] medical records  [id number]         Blood Pressure from Last 3 Encounters:   11/06/17 118/74   06/05/17 117/73   01/24/17 122/74    Weight from Last 3 Encounters:   11/06/17 203  lb (92.1 kg)   06/05/17 205 lb (93 kg)   01/24/17 207 lb (93.9 kg)              We Performed the Following     Beta HCG qual IFA urine - [facility] and [place]     HYSTEROSCOPY, SURGICAL; W/ ENDOMETRIAL ABLATION, ANY METHOD          Today's Medication Changes          These changes are accurate as of: 11/29/17 10:16 AM.  If you have any questions, ask your nurse or doctor.               Start taking these medicines.        Dose/Directions    doxycycline 100 MG capsule   Commonly known as:  VIBRAMYCIN   Used for:  Endometritis   Started by:  Josias Locke MD        Dose:  100 mg   Take 1 capsule (100 mg) by mouth 2 times daily   Quantity:  14 capsule   Refills:  0       HYDROcodone-acetaminophen 5-325 MG per tablet   Commonly known as:  NORCO   Used for:  Post-op pain   Started by:  Josias Locke MD        Dose:  1-2 tablet   Take 1-2 tablets by mouth every 4 hours as needed for moderate to severe pain maximum 8 tablet(s) per day   Quantity:  20 tablet   Refills:  0            Where to get your medicines      Some of these will need a paper prescription and others can be bought over the counter.  Ask your nurse if you have questions.     Bring a paper prescription for each of these medications     doxycycline 100 MG capsule    HYDROcodone-acetaminophen 5-325 MG per tablet                Primary Care Provider Office Phone # Fax #    Erick Mckeon [phone] [address] [facility] [address]  [facility] [address]        Equal Access to Services     [facility]: Hadii champ ibrahim hadasho Soivy, waaxda luqadaha, qaybta kaalmada adeegyada, lloyd gonzalez . So [facility] [phone].    ATENCIÓN: Si habla español, tiene a salcedo disposición servicios gratuitos de asistencia lingüística. Llame al [phone].    We comply with applicable federal civil rights laws and [place] laws. We do not discriminate on the basis of race, color, national origin, age, disability, sex,  sexual orientation, or gender identity.            Thank you!     Thank you for choosing [facility]  for your care. Our goal is always to provide you with excellent care. Hearing back from our patients is one way we can continue to improve our services. Please take a few minutes to complete the written survey that you may receive in the mail after your visit with us. Thank you!             Your Updated Medication List - Protect others around you: Learn how to safely use, store and throw away your medicines at www.disposemymeds.org.          This list is accurate as of: 11/29/17 10:16 AM.  Always use your most recent med list.                   Brand Name Dispense Instructions for use Diagnosis    albuterol 108 (90 BASE) MCG/ACT Inhaler    PROAIR HFA/PROVENTIL HFA/VENTOLIN HFA    1 Inhaler    Inhale 2 puffs into the lungs every 6 hours as needed for shortness of breath / dyspnea or wheezing    Acute bronchitis, unspecified organism       cholecalciferol 1000 UNIT tablet    vitamin D3     Take 1,000 Units by mouth        CYTOMEL PO      Take 5 mcg by mouth Take two tablets daily        doxycycline 100 MG capsule    VIBRAMYCIN    14 capsule    Take 1 capsule (100 mg) by mouth 2 times daily    Endometritis       guaiFENesin-codeine 100-10 MG/5ML Soln solution    ROBITUSSIN AC    120 mL    Take 5 mLs by mouth every 4 hours as needed for cough    Acute bronchitis, unspecified organism       HYDROcodone-acetaminophen 5-325 MG per tablet    NORCO    20 tablet    Take 1-2 tablets by mouth every 4 hours as needed for moderate to severe pain maximum 8 tablet(s) per day    Post-op pain       Multi-vitamin Tabs tablet           predniSONE 20 MG tablet    DELTASONE    5 tablet    Take 1 tablet (20 mg) by mouth daily    Acute bronchitis, unspecified organism       promethazine 25 MG tablet    PHENERGAN     Take 25 mg by mouth every 6 hours as needed for nausea        SUMAtriptan 25 MG tablet    IMITREX    18  tablet    Take 1-2 tablets (25-50 mg) by mouth at onset of headache for migraine May repeat in 2 hours. Max 8 tablets/24 hours.    Migraine with aura and without status migrainosus, not intractable       SYNTHROID PO      Take 12.5 mcg by mouth        Vitamin C 500 MG Caps      Take 1,000 mg by mouth

## 2017-11-29 NOTE — PROGRESS NOTES
PRE-PROCEDURE    Antoinette Deleon is a 47 year old female who presents today for a eligio ablation procedure.  She is here with her  who is responsible for driving her home.  The procedure was reviewed and the patient's questions were answered accordingly.  Restrictive garments were removed.  She has been NPO since 8:00 pm last night.     UCG performed:  Yes - negative,    Cytotec 200 mcg was taken last night?  Yes  Vitals: There were no vitals taken for this visit.  BMI= There is no height or weight on file to calculate BMI.    Allergies were reviewed:   No Known Allergies    POST-PROCEDURE     ANALGESIA SIDE EFFECTS MONITORING:  Awake and alert    Last sedation medication was given at:  09:50  Discharged with  at 10:20  on 11/29/2017    Pain scale:  1/10        The vitals were repeated following the procedure every 15 minutes:    TIME:  0920 TIME: 0950 TIME:  1015   BP:  124/84  PULSE: 91   RESPIRATIONS:    TEMPERATURE:  97.8  O2 SATS:  98 BP:  115/71  PULSE:  85  RESPIRATIONS:  20  TEMPERATURE:    O2 SATS:  96 BP:  113/69  PULSE:  74  RESPIRATIONS:  20  TEMPERATURE:   O2 SATS:  98       The patient was comfortable and in no pain.  She was taking fluids and able to empty her bladder.  She was awake, able to dress independently and deemed safe to be discharged home with her .  She was able to speak clearly, verbalize and understand her instructions.     PATIENT INSTRUCTIONS:      AFTER THE PROCEDURE      Eat something when you get home    Take a pain pill regardless of pain level and stay on top of the pain as it can be difficult to get discomfort under control after it starts    Take it easy for the rest of the day, perhaps take a nap    Use a heating pad to help with cramping    You can resume normal activities the day after the procedure    Do not drive or operate heavy machinery for at least 24 hours      For the next two weeks    You may experience mild to moderate pelvic cramping  (like menstrual cramping)    You may experience spotting and/or watery discharge (use pads, not tampons)    No sexual activity for two weeks post-procedure    CALL OUR OFFICE IF YOU DEVELOP ANY OF THE FOLLOWING SYMPTOMS:      Fever greater than 100.4 degrees Fahrenheit    Worsening pelvic pain    Nausea    Vomiting    Greenish vaginal discharge with odor    Office Phone number:  [phone]    Your post-procedure follow-up visit should be scheduled for 4-6 weeks after the procedure.    Elissa Nunez RNC

## 2017-11-29 NOTE — PROGRESS NOTES
.PREOPERATIVE DIAGNOSIS:  menorrhagia      POSTOPERATIVE DIAGNOSIS:  same      PROCEDURE:  Hysteroscopy, D&C, Elicia Endometrial Ablation     ANESTHESIA:  Conscious sedation    SURGEON:  Josias Locke MD    EBL: Minimal    Complications: None apparent    Findings: The uterine cavity appeared smooth and regular.  The corneal areas of the uterus were somewhat recessed.  There is no intracavitary polyp or fibroid.      PREOPERATIVE STATUS: The patient is a 47-year-old para 1 who since 2012 as had heavy menses.  She will soak through pads and tampons.  Evaluation in clinic showed a normal cavity.  She has a 3 cm subserosal fibroid.  She does not want hormonal therapy.  She's had a previous tubal ligation.  After thorough discussion she agreed to an endometrial ablation.  The risks, indications and alternatives to treatment were discussed with the patient prior to surgery.         OPERATIVE DESCRIPTION: Patient was taken to the Procedure room  She was then placed in the dorsal lithotomy position with legs in the yellow-fin stirrups and prepped and draped in the normal sterile fashion.  Anesthesia was administered. The bladder was not  emptied with straight catheter.    Speculum was placed and cervix visualized and the anterior lip grasped with a single tooth tenaculum.  The uterus was then sounded to 9 cm with an endometrial biopsy pipelle.  The cervix was then sequentially dilated to 5 mm with Laura dilators.  The hysteroscope was placed and the above findings noted.      The cervix was then sequentially dilated to 8 mm with Laura Dilators.  The uterine cavity length was calculated to be 5 cm.  The Elicia device was then placed through the cervix to the fundus and withdrawn slightly and deployed.  The device was gently rocked back and forth as it was deployed and after it was deployed to seat the device in the uterus.  The cervical balloon was deployed.  The cavity assessment was done and passed.  The Elicia was  then enabled and the ablation completed over 120 seconds. The Elicia device was then removed from the endometrial cavity.     The hysteroscope was replaced into the uterus and evidence of ablation noted.  The hysteroscope was removed.  The tenaculum was then removed and then the speculum removed.    Patient tolerated the procedure well.  EBL was minimal.  Josias Locke MD

## 2017-11-30 ENCOUNTER — TELEPHONE (OUTPATIENT)
Dept: OBGYN | Facility: CLINIC | Age: 47
End: 2017-11-30

## 2017-11-30 ENCOUNTER — APPOINTMENT (OUTPATIENT)
Dept: ULTRASOUND IMAGING | Facility: CLINIC | Age: 47
End: 2017-11-30
Attending: EMERGENCY MEDICINE
Payer: COMMERCIAL

## 2017-11-30 ENCOUNTER — APPOINTMENT (OUTPATIENT)
Dept: GENERAL RADIOLOGY | Facility: CLINIC | Age: 47
End: 2017-11-30
Attending: EMERGENCY MEDICINE
Payer: COMMERCIAL

## 2017-11-30 ENCOUNTER — HOSPITAL ENCOUNTER (EMERGENCY)
Facility: CLINIC | Age: 47
Discharge: HOME | End: 2017-11-30
Attending: EMERGENCY MEDICINE | Admitting: EMERGENCY MEDICINE
Payer: COMMERCIAL

## 2017-11-30 VITALS
TEMPERATURE: 99.5 F | DIASTOLIC BLOOD PRESSURE: 67 MMHG | HEART RATE: 125 BPM | RESPIRATION RATE: 20 BRPM | HEIGHT: 61 IN | WEIGHT: 200 LBS | OXYGEN SATURATION: 97 % | SYSTOLIC BLOOD PRESSURE: 113 MMHG | BODY MASS INDEX: 37.76 KG/M2

## 2017-11-30 DIAGNOSIS — R50.9 FEVER, UNSPECIFIED FEVER CAUSE: ICD-10-CM

## 2017-11-30 DIAGNOSIS — J06.9 UPPER RESPIRATORY TRACT INFECTION, UNSPECIFIED TYPE: ICD-10-CM

## 2017-11-30 DIAGNOSIS — G43.109 MIGRAINE WITH AURA AND WITHOUT STATUS MIGRAINOSUS, NOT INTRACTABLE: ICD-10-CM

## 2017-11-30 LAB
ALBUMIN UR-MCNC: NEGATIVE MG/DL
ANION GAP SERPL CALCULATED.3IONS-SCNC: 8 MMOL/L (ref 3–14)
APPEARANCE UR: CLEAR
BASOPHILS # BLD AUTO: 0 10E9/L (ref 0–0.2)
BASOPHILS NFR BLD AUTO: 0.1 %
BILIRUB UR QL STRIP: NEGATIVE
BUN SERPL-MCNC: 13 MG/DL (ref 7–30)
CALCIUM SERPL-MCNC: 9 MG/DL (ref 8.5–10.1)
CHLORIDE SERPL-SCNC: 103 MMOL/L (ref 94–109)
CO2 SERPL-SCNC: 25 MMOL/L (ref 20–32)
COLOR UR AUTO: YELLOW
CREAT SERPL-MCNC: 0.85 MG/DL (ref 0.52–1.04)
DIFFERENTIAL METHOD BLD: ABNORMAL
EOSINOPHIL # BLD AUTO: 0 10E9/L (ref 0–0.7)
EOSINOPHIL NFR BLD AUTO: 0.2 %
ERYTHROCYTE [DISTWIDTH] IN BLOOD BY AUTOMATED COUNT: 13.5 % (ref 10–15)
GFR SERPL CREATININE-BSD FRML MDRD: 72 ML/MIN/1.7M2
GLUCOSE SERPL-MCNC: 90 MG/DL (ref 70–99)
GLUCOSE UR STRIP-MCNC: NEGATIVE MG/DL
HCT VFR BLD AUTO: 40.1 % (ref 35–47)
HGB BLD-MCNC: 13.4 G/DL (ref 11.7–15.7)
HGB UR QL STRIP: ABNORMAL
IMM GRANULOCYTES # BLD: 0 10E9/L (ref 0–0.4)
IMM GRANULOCYTES NFR BLD: 0.3 %
KETONES UR STRIP-MCNC: NEGATIVE MG/DL
LEUKOCYTE ESTERASE UR QL STRIP: ABNORMAL
LYMPHOCYTES # BLD AUTO: 1.4 10E9/L (ref 0.8–5.3)
LYMPHOCYTES NFR BLD AUTO: 10.9 %
MCH RBC QN AUTO: 28.4 PG (ref 26.5–33)
MCHC RBC AUTO-ENTMCNC: 33.4 G/DL (ref 31.5–36.5)
MCV RBC AUTO: 85 FL (ref 78–100)
MONOCYTES # BLD AUTO: 0.9 10E9/L (ref 0–1.3)
MONOCYTES NFR BLD AUTO: 7.4 %
MUCOUS THREADS #/AREA URNS LPF: PRESENT /LPF
NEUTROPHILS # BLD AUTO: 10.3 10E9/L (ref 1.6–8.3)
NEUTROPHILS NFR BLD AUTO: 81.1 %
NITRATE UR QL: NEGATIVE
NRBC # BLD AUTO: 0 10*3/UL
NRBC BLD AUTO-RTO: 0 /100
PH UR STRIP: 5.5 PH (ref 5–7)
PLATELET # BLD AUTO: 128 10E9/L (ref 150–450)
POTASSIUM SERPL-SCNC: 3.6 MMOL/L (ref 3.4–5.3)
RBC # BLD AUTO: 4.72 10E12/L (ref 3.8–5.2)
RBC #/AREA URNS AUTO: 2 /HPF (ref 0–2)
SODIUM SERPL-SCNC: 136 MMOL/L (ref 133–144)
SOURCE: ABNORMAL
SP GR UR STRIP: 1.02 (ref 1–1.03)
SQUAMOUS #/AREA URNS AUTO: <1 /HPF (ref 0–1)
UROBILINOGEN UR STRIP-MCNC: NORMAL MG/DL (ref 0–2)
WBC # BLD AUTO: 12.6 10E9/L (ref 4–11)
WBC #/AREA URNS AUTO: 3 /HPF (ref 0–2)

## 2017-11-30 PROCEDURE — 25000128 H RX IP 250 OP 636: Performed by: EMERGENCY MEDICINE

## 2017-11-30 PROCEDURE — 80048 BASIC METABOLIC PNL TOTAL CA: CPT | Performed by: EMERGENCY MEDICINE

## 2017-11-30 PROCEDURE — 96361 HYDRATE IV INFUSION ADD-ON: CPT

## 2017-11-30 PROCEDURE — 96360 HYDRATION IV INFUSION INIT: CPT

## 2017-11-30 PROCEDURE — 85025 COMPLETE CBC W/AUTO DIFF WBC: CPT | Performed by: EMERGENCY MEDICINE

## 2017-11-30 PROCEDURE — 99285 EMERGENCY DEPT VISIT HI MDM: CPT | Mod: 25

## 2017-11-30 PROCEDURE — 93976 VASCULAR STUDY: CPT

## 2017-11-30 PROCEDURE — 25000132 ZZH RX MED GY IP 250 OP 250 PS 637: Performed by: EMERGENCY MEDICINE

## 2017-11-30 PROCEDURE — 81001 URINALYSIS AUTO W/SCOPE: CPT | Performed by: EMERGENCY MEDICINE

## 2017-11-30 PROCEDURE — 71020 XR CHEST 2 VW: CPT

## 2017-11-30 RX ORDER — HYDROCODONE BITARTRATE AND ACETAMINOPHEN 5; 325 MG/1; MG/1
1 TABLET ORAL ONCE
Status: COMPLETED | OUTPATIENT
Start: 2017-11-30 | End: 2017-11-30

## 2017-11-30 RX ADMIN — HYDROCODONE BITARTRATE AND ACETAMINOPHEN 1 TABLET: 5; 325 TABLET ORAL at 15:04

## 2017-11-30 RX ADMIN — SODIUM CHLORIDE 1000 ML: 9 INJECTION, SOLUTION INTRAVENOUS at 13:46

## 2017-11-30 ASSESSMENT — ENCOUNTER SYMPTOMS
ABDOMINAL PAIN: 0
CHILLS: 1
COUGH: 1
FLANK PAIN: 0
RHINORRHEA: 0

## 2017-11-30 NOTE — ED PROVIDER NOTES
"  History     Chief Complaint:  Fever    HPI   Antoinette Deleon is a 47 year old female who presents with a fever. The patient had an endometrial ablation procedure performed yesterday. She then awoke this morning with chills and a non productive cough she believes to be a bronchitis flare up. She measured her temperature at 100 prior to arrival here in the ED. While here she denies any congestion, runny nose, flank pain, or abdominal pain. She does also note some very slight vaginal bleeding, however she denies any discharge or other related symptoms. She has taken her pain medication prescribed after her procedure yesterday already this morning, but no other medications.     Allergies:  No Known Drug Allergies     Medications:    Norco  Vibramycin  Deltasone  Albuterol  Robitussin  Phenergan  Synthroid  Cytomel  Imitrex    Past Medical History:    Menorrhagia  hypothyroidism    Past Surgical History:    AS Hysteroscopy w endometrial ablation  Tubal ligation  Bladder surgery  Sling bladder suspension with fascia patti    Family History:    Type 2 diabetes mellitus  Hypertension    Social History:  The patient was accompanied to the ED by her .  Smoking Status: No  Smokeless Tobacco: No  Alcohol Use: No  Marital Status:        Review of Systems   Constitutional: Positive for chills.   HENT: Negative for congestion and rhinorrhea.    Respiratory: Positive for cough.    Gastrointestinal: Negative for abdominal pain.   Genitourinary: Positive for vaginal bleeding. Negative for flank pain, vaginal discharge and vaginal pain.   All other systems reviewed and are negative.    Physical Exam   Vitals:  Patient Vitals for the past 24 hrs:   BP Temp Temp src Pulse Heart Rate Resp SpO2 Height Weight   11/30/17 1606 113/67 - - - 109 20 97 % - -   11/30/17 1254 139/71 99.5  F (37.5  C) Temporal 125 - 16 98 % 1.554 m (5' 1.2\") 90.7 kg (200 lb)     Physical Exam  General: Appears well-developed and " well-nourished.   Head: No signs of trauma.   Mouth/Throat: Oropharynx is clear and moist.   CV: Normal rate and regular rhythm.    Resp: Effort normal and breath sounds normal. No respiratory distress.   GI: Soft. There is minimal suprapubic tenderness without rebound or guarding.  Normal bowel sounds.  No CVA tenderness.  :  No vaginal bleeding or abnormal discharge.  No CMT or adnexal tenderness.    MSK: Normal range of motion. no edema. No Calf tenderness.  Neuro: The patient is alert and oriented.  Speech normal.  GCS 15  Skin: Skin is warm and dry. No rash noted.   Psych: normal mood and affect. behavior is normal.       Emergency Department Course     Imaging:  Radiology findings were communicated with the patient who voiced understanding of the findings.  Chest XR, PA & LAT:  Negative.  Per Radiologist.     US Pelvic Complete w Transvaginal & Abd/Pel Duplex Limited:  1. No acute endometrial or uterine abnormality. No uterine fluid collection identified.  2. New complex cystic structure at the right ovary is 2.3 cm. This could just be a functional cyst. Small free pelvic fluid. Consider 6-8 week follow-up pelvic ultrasound to ensure resolution.  3. Nonvisualized left ovary.  Per Radiologist.     Laboratory:  Laboratory findings were communicated with the patient who voiced understanding of the findings.  UA: Blood: Small (A), Leukocyte esterase: Trace (A), Mucous: Present (A)  CBC: WBC: 12.6 (H), PLT: 128 (L) o/w WNL. (HGB 13.4)   BMP: AWNL (Creatinine 0.85)    Interventions:  1346 Normal Saline 1000 mL IV  1504 [place], 325 mg, PO     Emergency Department Course:  Nursing notes and vitals reviewed.  1323 I had my initial encounter with the patient.  I performed an exam of the patient as documented above.   IV was inserted and blood was drawn for laboratory testing, results above.  The patient provided a urine sample here in the emergency department. This was sent for laboratory testing, findings above.  The  patient was sent for a XR and US while in the emergency department, results above.   1534 I performed a pelvic exam here in the ED.  1554 I spoke with Dr. Locke of OBGYN regarding this patient.    1559 I discussed the treatment plan with the patient. They expressed understanding of this plan and consented to discharge. They will be discharged home with instructions for care and follow up. In addition, the patient will return to the emergency department if their symptoms persist, worsen, if new symptoms arise or if there is any concern.  All questions were answered.    Impression & Plan      Medical Decision Making:  Antoinette Deleon is a 47-year-old woman presents due to a fever.  She's had a cough and congestion over the last few days.  She had a endometrial ablation done yesterday, and so when she had the fever, she called her OB who recommended she come to the ER.  She denies any increase or change in abdominal pain or vaginal discharge.  She did have some congestion on my exam, but no respiratory distress.  Exam is overall fairly benign very minimal lower abdominal tenderness.  Pelvic exam show any abnormal discharge or cervical motion tenderness.  Pelvic ultrasound showed an ovarian cyst, but no other signs of acute process.  Her white blood cell count was only very minimally elevated at 12.  I did speak with the patient's gynecologist.  Patient had already been placed on doxycycline, and has a few more days left of this.  This should cover for any respiratory causes.  Given her otherwise benign exam, both myself and the gynecologist felt that the patient is appropriate for discharge home and follow-up as an out patient's.  She is given clear section she return to the ER if she has worsening abdominal pain, discharge, or any other new or concerning symptoms.    Diagnosis:    ICD-10-CM    1. Fever, unspecified fever cause R50.9    2. Upper respiratory tract infection, unspecified type J06.9       Disposition:   Discharge    Scribe Disclosure:  I, Sachin Thomas, am serving as a scribe at 1:18 PM on 11/30/2017 to document services personally performed by Hans Batista MD, based on my observations and the provider's statements to me.    11/30/2017    EMERGENCY DEPARTMENT       Hans Batista MD  12/01/17 1394

## 2017-11-30 NOTE — ED AVS SNAPSHOT
Emergency Department    [address] [facility] [address]    Phone:  [phone]    Fax:  [phone]                                       Antoinette Deleon   MRN: [id number]    Department:   Emergency Department   Date of Visit:  11/30/2017           Patient Information     Date Of Birth          1970        Your diagnoses for this visit were:     Fever, unspecified fever cause     Upper respiratory tract infection, unspecified type        You were seen by Hans Batista MD.      Follow-up Information     Follow up with Erick Mckeon MD In 3 days.    Specialty:  Internal Medicine    Contact information:    [address] [address] [phone]          Follow up with  Emergency Department.    Specialty:  EMERGENCY MEDICINE    Why:  As needed, If symptoms worsen    Contact information:    [address] [facility] [id number] [phone]        Discharge Instructions         Viral Upper Respiratory Illness (Adult)  You have a viral upper respiratory illness (URI), which is another term for the common cold. This illness is contagious during the first few days. It is spread through the air by coughing and sneezing. It may also be spread by direct contact (touching the sick person and then touching your own eyes, nose, or mouth). Frequent handwashing will decrease risk of spread. Most viral illnesses go away within 7 to 10 days with rest and simple home remedies. Sometimes the illness may last for several weeks. Antibiotics will not kill a virus, and they are generally not prescribed for this condition.    Home care    If symptoms are severe, rest at home for the first 2 to 3 days. When you resume activity, don't let yourself get too tired.    Avoid being exposed to cigarette smoke (yours or others ).    You may use acetaminophen or ibuprofen to control pain and fever, unless another medicine was prescribed. (Note: If you have chronic  liver or kidney disease, have ever had a stomach ulcer or gastrointestinal bleeding, or are taking blood-thinning medicines, talk with your healthcare provider before using these medicines.) Aspirin should never be given to anyone under 18 years of age who is ill with a viral infection or fever. It may cause severe liver or brain damage.    Your appetite may be poor, so a light diet is fine. Avoid dehydration by drinking 6 to 8 glasses of fluids per day (water, soft drinks, juices, tea, or soup). Extra fluids will help loosen secretions in the nose and lungs.    Over-the-counter cold medicines will not shorten the length of time you re sick, but they may be helpful for the following symptoms: cough, sore throat, and nasal and sinus congestion. (Note: Do not use decongestants if you have high blood pressure.)  Follow-up care  Follow up with your healthcare provider, or as advised.  When to seek medical advice  Call your healthcare provider right away if any of these occur:    Cough with lots of colored sputum (mucus)    Severe headache; face, neck, or ear pain    Difficulty swallowing due to throat pain    Fever of 100.4 F (38 C)  Call 911, or get immediate medical care  Call emergency services right away if any of these occur:    Chest pain, shortness of breath, wheezing, or difficulty breathing    Coughing up blood    Inability to swallow due to throat pain  Date Last Reviewed: 9/13/2015 2000-2017 [employer]. [address] [place] [address]. All rights reserved. This information is not intended as a substitute for professional medical care. Always follow your healthcare professional's instructions.          Future Appointments        Provider Department Dept Phone Center    12/5/2017 5:00 PM Erick Mckeon MD, MD [facility] [phone]       24 Hour Appointment Hotline       To make an appointment at any [facility], call [phone] ([phone]). If  you don't have a family doctor or clinic, we will help you find one. [facility] clinics are conveniently located to serve the needs of you and your family.             Review of your medicines      Our records show that you are taking the medicines listed below. If these are incorrect, please call your family doctor or clinic.        Dose / Directions Last dose taken    albuterol 108 (90 BASE) MCG/ACT Inhaler   Commonly known as:  PROAIR HFA/PROVENTIL HFA/VENTOLIN HFA   Dose:  2 puff   Quantity:  1 Inhaler        Inhale 2 puffs into the lungs every 6 hours as needed for shortness of breath / dyspnea or wheezing   Refills:  1        cholecalciferol 1000 UNIT tablet   Commonly known as:  vitamin D3   Dose:  1000 Units        Take 1,000 Units by mouth   Refills:  0        CYTOMEL PO   Dose:  5 mcg        Take 5 mcg by mouth Take two tablets daily   Refills:  0        doxycycline 100 MG capsule   Commonly known as:  VIBRAMYCIN   Dose:  100 mg   Quantity:  14 capsule        Take 1 capsule (100 mg) by mouth 2 times daily   Refills:  0        guaiFENesin-codeine 100-10 MG/5ML Soln solution   Commonly known as:  ROBITUSSIN AC   Dose:  1 tsp.   Quantity:  120 mL        Take 5 mLs by mouth every 4 hours as needed for cough   Refills:  0        HYDROcodone-acetaminophen 5-325 MG per tablet   Commonly known as:  NORCO   Dose:  1-2 tablet   Quantity:  20 tablet        Take 1-2 tablets by mouth every 4 hours as needed for moderate to severe pain maximum 8 tablet(s) per day   Refills:  0        Multi-vitamin Tabs tablet        Refills:  0        predniSONE 20 MG tablet   Commonly known as:  DELTASONE   Dose:  20 mg   Quantity:  5 tablet        Take 1 tablet (20 mg) by mouth daily   Refills:  0        promethazine 25 MG tablet   Commonly known as:  PHENERGAN   Dose:  25 mg        Take 25 mg by mouth every 6 hours as needed for nausea   Refills:  0        SUMAtriptan 25 MG tablet   Commonly known as:  IMITREX   Dose:  25-50 mg    Quantity:  18 tablet        Take 1-2 tablets (25-50 mg) by mouth at onset of headache for migraine May repeat in 2 hours. Max 8 tablets/24 hours.   Refills:  11        SYNTHROID PO   Dose:  12.5 mcg        Take 12.5 mcg by mouth   Refills:  0        Vitamin C 500 MG Caps   Dose:  1000 mg        Take 1,000 mg by mouth   Refills:  0                Procedures and tests performed during your visit     Basic metabolic panel    CBC with platelets + differential    Chest XR,  PA & LAT    Prep for procedure - pelvic exam    UA with Microscopic    US Pelvic Complete w Transvaginal & Abd/Pel Duplex Limited      Orders Needing Specimen Collection     None      Pending Results     Date and Time Order Name Status Description    11/30/2017 1328 US Pelvic Complete w Transvaginal & Abd/Pel Duplex Limited Preliminary             Pending Culture Results     No orders found from 11/28/2017 to 12/1/2017.            Pending Results Instructions     If you had any lab results that were not finalized at the time of your Discharge, you can call the ED Lab Result RN at [phone]. You will be contacted by this team for any positive Lab results or changes in treatment. The nurses are available 7 days a week from 10A to 6:30P.  You can leave a message 24 hours per day and they will return your call.        Test Results From Your Hospital Stay        11/30/2017  1:55 PM      Component Results     Component Value Ref Range & Units Status    WBC 12.6 (H) 4.0 - 11.0 10e9/L Final    RBC Count 4.72 3.8 - 5.2 10e12/L Final    Hemoglobin 13.4 11.7 - 15.7 g/dL Final    Hematocrit 40.1 35.0 - 47.0 % Final    MCV 85 78 - 100 fl Final    MCH 28.4 26.5 - 33.0 pg Final    MCHC 33.4 31.5 - 36.5 g/dL Final    RDW 13.5 10.0 - 15.0 % Final    Platelet Count 128 (L) 150 - 450 10e9/L Final    Diff Method Automated Method  Final    % Neutrophils 81.1 % Final    % Lymphocytes 10.9 % Final    % Monocytes 7.4 % Final    % Eosinophils 0.2 % Final    % Basophils  0.1 % Final    % Immature Granulocytes 0.3 % Final    Nucleated RBCs 0 0 /100 Final    Absolute Neutrophil 10.3 (H) 1.6 - 8.3 10e9/L Final    Absolute Lymphocytes 1.4 0.8 - 5.3 10e9/L Final    Absolute Monocytes 0.9 0.0 - 1.3 10e9/L Final    Absolute Eosinophils 0.0 0.0 - 0.7 10e9/L Final    Absolute Basophils 0.0 0.0 - 0.2 10e9/L Final    Abs Immature Granulocytes 0.0 0 - 0.4 10e9/L Final    Absolute Nucleated RBC 0.0  Final         11/30/2017  2:07 PM      Component Results     Component Value Ref Range & Units Status    Sodium 136 133 - 144 mmol/L Final    Potassium 3.6 3.4 - 5.3 mmol/L Final    Chloride 103 94 - 109 mmol/L Final    Carbon Dioxide 25 20 - 32 mmol/L Final    Anion Gap 8 3 - 14 mmol/L Final    Glucose 90 70 - 99 mg/dL Final    Urea Nitrogen 13 7 - 30 mg/dL Final    Creatinine 0.85 0.52 - 1.04 mg/dL Final    GFR Estimate 72 >60 mL/min/1.7m2 Final    Non  GFR Calc    GFR Estimate If Black 87 >60 mL/min/1.7m2 Final    African American GFR Calc    Calcium 9.0 8.5 - 10.1 mg/dL Final         11/30/2017  2:56 PM      Narrative     PELVIC ULTRASOUND WITH ENDOVAGINAL TRANSDUCER IMAGING    11/30/2017  2:50 PM     HISTORY: Endometrial ablation yesterday.  Fevers today.     TECHNIQUE:  Endovaginal sonography was added to the transabdominal  exam to better evaluate the uterus and ovaries.    COMPARISON: Pelvic ultrasound 10/30/2017.    FINDINGS: Endometrium is poorly visualized. The patient is status post  endometrial ablation. Uterus is 8.2 x 5.3 x 4.8 cm. No focal  myometrial lesions identified. No uterine or endometrial fluid  collections identified. Left ovary is not visualized. The right ovary  contains a complex cystic lesion with some peripheral wall thickening  that is 1.8 x 1.9 x 2.3 cm. This is newly identified since the prior  exam. There is small free pelvic fluid. Color and Doppler spectral  analysis of the right ovary shows normal waveforms.        Impression     IMPRESSION:  1. No  acute endometrial or uterine abnormality. No uterine fluid  collection identified.  2. New complex cystic structure at the right ovary is 2.3 cm. This  could just be a functional cyst. Small free pelvic fluid. Consider 6-8  week follow-up pelvic ultrasound to ensure resolution.  3. Nonvisualized left ovary.         11/30/2017  2:30 PM      Narrative     CHEST TWO VIEWS  11/30/2017 2:17 PM    HISTORY:  Fever and cough.    COMPARISON:  None.        Impression     IMPRESSION:  Negative.     JUNIOR LEY MD         11/30/2017  2:00 PM      Component Results     Component Value Ref Range & Units Status    Color Urine Yellow  Final    Appearance Urine Clear  Final    Glucose Urine Negative NEG^Negative mg/dL Final    Bilirubin Urine Negative NEG^Negative Final    Ketones Urine Negative NEG^Negative mg/dL Final    Specific Gravity Urine 1.019 1.003 - 1.035 Final    Blood Urine Small (A) NEG^Negative Final    pH Urine 5.5 5.0 - 7.0 pH Final    Protein Albumin Urine Negative NEG^Negative mg/dL Final    Urobilinogen mg/dL Normal 0.0 - 2.0 mg/dL Final    Nitrite Urine Negative NEG^Negative Final    Leukocyte Esterase Urine Trace (A) NEG^Negative Final    Source Catheterized Urine  Final    WBC Urine 3 (H) 0 - 2 /HPF Final    RBC Urine 2 0 - 2 /HPF Final    Squamous Epithelial /HPF Urine <1 0 - 1 /HPF Final    Mucous Urine Present (A) NEG^Negative /LPF Final                Clinical Quality Measure: Blood Pressure Screening     Your blood pressure was checked while you were in the emergency department today. The last reading we obtained was  BP: 139/71 . Please read the guidelines below about what these numbers mean and what you should do about them.  If your systolic blood pressure (the top number) is less than 120 and your diastolic blood pressure (the bottom number) is less than 80, then your blood pressure is normal. There is nothing more that you need to do about it.  If your systolic blood pressure (the top number) is  120-139 or your diastolic blood pressure (the bottom number) is 80-89, your blood pressure may be higher than it should be. You should have your blood pressure rechecked within a year by a primary care provider.  If your systolic blood pressure (the top number) is 140 or greater or your diastolic blood pressure (the bottom number) is 90 or greater, you may have high blood pressure. High blood pressure is treatable, but if left untreated over time it can put you at risk for heart attack, stroke, or kidney failure. You should have your blood pressure rechecked by a primary care provider within the next 4 weeks.  If your provider in the emergency department today gave you specific instructions to follow-up with your doctor or provider even sooner than that, you should follow that instruction and not wait for up to 4 weeks for your follow-up visit.        Thank you for choosing [facility]       Thank you for choosing [facility] for your care. Our goal is always to provide you with excellent care. Hearing back from our patients is one way we can continue to improve our services. Please take a few minutes to complete the written survey that you may receive in the mail after you visit with us. Thank you!        [employer] Information     [employer] gives you secure access to your electronic health record. If you see a primary care provider, you can also send messages to your care team and make appointments. If you have questions, please call your primary care clinic.  If you do not have a primary care provider, please call [phone] and they will assist you.        Care EveryWhere ID     This is your Care EveryWhere ID. This could be used by other organizations to access your [facility] medical records  [id number]        Equal Access to Services     MARTY ROBLES : Janes Dunham, wajeremyda wilfredo, qaybta kaalsejal grande, lloyd castro. So [facility] [phone].    ATENCIÓN: Jaylin edge  español, tiene a salcedo disposición servicios gratuitos de asistencia lingüística. Yessenia al [phone].    We comply with applicable federal civil rights laws and [place] laws. We do not discriminate on the basis of race, color, national origin, age, disability, sex, sexual orientation, or gender identity.            After Visit Summary       This is your record. Keep this with you and show to your community pharmacist(s) and doctor(s) at your next visit.

## 2017-11-30 NOTE — TELEPHONE ENCOUNTER
Pending Prescriptions:                       Disp   Refills    SUMAtriptan (IMITREX) 25 MG tablet [Pharm*18 tab*11           Sig: TAKE 1 TO 2 TABLETS (25-50MG) BY MOUTH AT ONSET           OF HEADACHE FOR MIGRAINE. MAY REPEAT IN 2 HOURS.           MAX 8 TABLETS IN 24 HOURS          Last Written Prescription Date:  9/26/16  Last Fill Quantity: 19,   # refills: 11  Last Office Visit: 9/26/16  Future Office visit:    Next 5 appointments (look out 90 days)     Dec 05, 2017  5:00 PM CST   [facility] Visit [facility]   [facility]    [address] [facility] [address]   [phone]                       Kari Bhat RT(R)

## 2017-11-30 NOTE — TELEPHONE ENCOUNTER
"Called pt, she had a Hysteroscopy, D&C, Elicia Endometrial Ablation done by Dr. Locke yesterday (11/30/17) in the clinic procedure room. Pt states she got home yesterday, was comfortable and then this morning took half a pain pill around 9:00 AM (HYDROcodone-acetaminophen (NORCO) 5-325 MG per tablet) and states she started having chills and would not warm up, she then took her daily antibiotic. She has had Bronchitis and feels like it \"has freshed up.\" She has noticed her cough has thickened up and can't clear it, did use her inhaler and it worked a little. Has 100.0 F temperature and cheeks feel warm. C/o period like cramping, it's \"just there,\" couldn't answer if it's constant or comes and goes. Has been changing a pad every 2 hrs, every time uses bathroom she passes a quarter size clot.     Talked with Dr. Locke about pt's sx's and he recommended she go into the ER as her sx's are \"not normal.\" Informed pt of this information and based off her taking a pain pill at 10:30 AM, recommended she have someone drive her into the nearest ER. Pt verbalized understanding.        Closing encounter.      "

## 2017-11-30 NOTE — ED AVS SNAPSHOT
Emergency Department    [address] [place] [address]    Phone:  [phone]    Fax:  [phone]                                       Antoinette Deleon   MRN: [id number]    Department:   Emergency Department   Date of Visit:  11/30/2017           After Visit Summary Signature Page     I have received my discharge instructions, and my questions have been answered. I have discussed any challenges I see with this plan with the nurse or doctor.    ..........................................................................................................................................  Patient/Patient Representative Signature      ..........................................................................................................................................  Patient Representative Print Name and Relationship to Patient    ..................................................               ................................................  Date                                            Time    ..........................................................................................................................................  Reviewed by Signature/Title    ...................................................              ..............................................  Date                                                            Time

## 2017-11-30 NOTE — DISCHARGE INSTRUCTIONS

## 2017-12-01 RX ORDER — SUMATRIPTAN 25 MG/1
TABLET, FILM COATED ORAL
Qty: 18 TABLET | Refills: 0 | Status: SHIPPED | OUTPATIENT
Start: 2017-12-01 | End: 2019-07-29

## 2018-01-09 ENCOUNTER — OFFICE VISIT (OUTPATIENT)
Dept: OBGYN | Facility: CLINIC | Age: 48
End: 2018-01-09
Payer: COMMERCIAL

## 2018-01-09 VITALS
HEIGHT: 72 IN | HEART RATE: 78 BPM | BODY MASS INDEX: 27.5 KG/M2 | DIASTOLIC BLOOD PRESSURE: 66 MMHG | WEIGHT: 203 LBS | SYSTOLIC BLOOD PRESSURE: 104 MMHG

## 2018-01-09 DIAGNOSIS — Z48.816 AFTERCARE FOLLOWING SURGERY OF THE GENITOURINARY SYSTEM: Primary | ICD-10-CM

## 2018-01-09 PROCEDURE — 99212 OFFICE O/P EST SF 10 MIN: CPT | Performed by: OBSTETRICS & GYNECOLOGY

## 2018-01-09 NOTE — PROGRESS NOTES
Patient is seen for follow-up after a Elicia endometrial ablation on November 30.  She feels well.  She had minimal spotting and discharge following the procedure.  She did have a bronchitis which has resolved.  She is very happy with the outcome.    Plan: Patient will return to clinic as needed.

## 2018-01-09 NOTE — MR AVS SNAPSHOT
"              After Visit Summary   1/9/2018    Antoinette Deleon    MRN: [id number]           Patient Information     Date Of Birth          1970        Visit Information        Provider Department      1/9/2018 10:00 AM Josias Locke MD [facility]         Follow-ups after your visit        Who to contact     If you have questions or need follow up information about today's clinic visit or your schedule please contact [facility] directly at [phone].  Normal or non-critical lab and imaging results will be communicated to you by MyChart, letter or phone within 4 business days after the clinic has received the results. If you do not hear from us within 7 days, please contact the clinic through Fashionchickhart or phone. If you have a critical or abnormal lab result, we will notify you by phone as soon as possible.  Submit refill requests through Fidbacks or call your pharmacy and they will forward the refill request to us. Please allow 3 business days for your refill to be completed.          Additional Information About Your Visit        MyChart Information     Fidbacks gives you secure access to your electronic health record. If you see a primary care provider, you can also send messages to your care team and make appointments. If you have questions, please call your primary care clinic.  If you do not have a primary care provider, please call [phone] and they will assist you.        Care EveryWhere ID     This is your Care EveryWhere ID. This could be used by other organizations to access your [place] medical records  [id number]        Your Vitals Were     Pulse Height BMI (Body Mass Index)             78 5' 11.5\" (1.816 m) 27.92 kg/m2          Blood Pressure from Last 3 Encounters:   01/09/18 104/66   11/30/17 113/67   11/06/17 118/74    Weight from Last 3 Encounters:   01/09/18 203 lb (92.1 kg)   11/30/17 200 lb (90.7 kg)   11/06/17 203 lb (92.1 kg)    "           Today, you had the following     No orders found for display       Primary Care Provider Office Phone # Fax #    Erick Mckeon [phone] [address] [address]        Equal Access to Services     HASEEBMARTY TRAVIS : Hadii aad ku hadmonao Soomaali, waaxda luqadaha, qaybta kaalmada adeegyada, waxbessie fatimahin haymarlenen ademisael lynn carlos . So [facility] [phone].    ATENCIÓN: Si habla español, tiene a salcedo disposición servicios gratuitos de asistencia lingüística. Llame al [phone].    We comply with applicable federal civil rights laws and [place] laws. We do not discriminate on the basis of race, color, national origin, age, disability, sex, sexual orientation, or gender identity.            Thank you!     Thank you for choosing [facility]  for your care. Our goal is always to provide you with excellent care. Hearing back from our patients is one way we can continue to improve our services. Please take a few minutes to complete the written survey that you may receive in the mail after your visit with us. Thank you!             Your Updated Medication List - Protect others around you: Learn how to safely use, store and throw away your medicines at www.disposemymeds.org.          This list is accurate as of: 1/9/18 10:36 AM.  Always use your most recent med list.                   Brand Name Dispense Instructions for use Diagnosis    albuterol 108 (90 BASE) MCG/ACT Inhaler    PROAIR HFA/PROVENTIL HFA/VENTOLIN HFA    1 Inhaler    Inhale 2 puffs into the lungs every 6 hours as needed for shortness of breath / dyspnea or wheezing    Acute bronchitis, unspecified organism       cholecalciferol 1000 UNIT tablet    vitamin D3     Take 1,000 Units by mouth        CYTOMEL PO      Take 5 mcg by mouth Take two tablets daily        Multi-vitamin Tabs tablet           SUMAtriptan 25 MG tablet    IMITREX    18 tablet    TAKE 1 TO 2 TABLETS (25-50MG) BY  MOUTH AT ONSET OF HEADACHE FOR MIGRAINE. MAY REPEAT IN 2 HOURS. MAX 8 TABLETS IN 24 HOURS    Migraine with aura and without status migrainosus, not intractable       SYNTHROID PO      Take 12.5 mcg by mouth        Vitamin C 500 MG Caps      Take 1,000 mg by mouth

## 2018-11-28 ENCOUNTER — OFFICE VISIT (OUTPATIENT)
Dept: OBGYN | Facility: CLINIC | Age: 48
End: 2018-11-28
Attending: NURSE PRACTITIONER
Payer: COMMERCIAL

## 2018-11-28 ENCOUNTER — RADIANT APPOINTMENT (OUTPATIENT)
Dept: MAMMOGRAPHY | Facility: CLINIC | Age: 48
End: 2018-11-28
Attending: NURSE PRACTITIONER
Payer: COMMERCIAL

## 2018-11-28 ENCOUNTER — RESULT FOLLOW UP (OUTPATIENT)
Dept: OBGYN | Facility: CLINIC | Age: 48
End: 2018-11-28

## 2018-11-28 VITALS
HEART RATE: 68 BPM | SYSTOLIC BLOOD PRESSURE: 116 MMHG | HEIGHT: 71 IN | BODY MASS INDEX: 28.98 KG/M2 | DIASTOLIC BLOOD PRESSURE: 62 MMHG | WEIGHT: 207 LBS

## 2018-11-28 DIAGNOSIS — Z01.419 ENCOUNTER FOR GYNECOLOGICAL EXAMINATION WITHOUT ABNORMAL FINDING: Primary | ICD-10-CM

## 2018-11-28 DIAGNOSIS — Z12.31 VISIT FOR SCREENING MAMMOGRAM: ICD-10-CM

## 2018-11-28 PROCEDURE — 77067 SCR MAMMO BI INCL CAD: CPT | Mod: TC

## 2018-11-28 PROCEDURE — 99396 PREV VISIT EST AGE 40-64: CPT | Performed by: NURSE PRACTITIONER

## 2018-11-28 PROCEDURE — G0145 SCR C/V CYTO,THINLAYER,RESCR: HCPCS | Performed by: NURSE PRACTITIONER

## 2018-11-28 PROCEDURE — 87624 HPV HI-RISK TYP POOLED RSLT: CPT | Performed by: NURSE PRACTITIONER

## 2018-11-28 RX ORDER — ONDANSETRON 4 MG/1
4 TABLET, FILM COATED ORAL EVERY 8 HOURS PRN
COMMUNITY
End: 2021-05-18

## 2018-11-28 ASSESSMENT — PATIENT HEALTH QUESTIONNAIRE - PHQ9
5. POOR APPETITE OR OVEREATING: SEVERAL DAYS
SUM OF ALL RESPONSES TO PHQ QUESTIONS 1-9: 8

## 2018-11-28 ASSESSMENT — ANXIETY QUESTIONNAIRES
5. BEING SO RESTLESS THAT IT IS HARD TO SIT STILL: NOT AT ALL
2. NOT BEING ABLE TO STOP OR CONTROL WORRYING: NOT AT ALL
6. BECOMING EASILY ANNOYED OR IRRITABLE: NOT AT ALL
1. FEELING NERVOUS, ANXIOUS, OR ON EDGE: NOT AT ALL
3. WORRYING TOO MUCH ABOUT DIFFERENT THINGS: NOT AT ALL
7. FEELING AFRAID AS IF SOMETHING AWFUL MIGHT HAPPEN: SEVERAL DAYS
GAD7 TOTAL SCORE: 2
IF YOU CHECKED OFF ANY PROBLEMS ON THIS QUESTIONNAIRE, HOW DIFFICULT HAVE THESE PROBLEMS MADE IT FOR YOU TO DO YOUR WORK, TAKE CARE OF THINGS AT HOME, OR GET ALONG WITH OTHER PEOPLE: NOT DIFFICULT AT ALL

## 2018-11-28 NOTE — MR AVS SNAPSHOT
After Visit Summary   11/28/2018    Antoinette Deleon    MRN: [id number]           Patient Information     Date Of Birth          1970        Visit Information        Provider Department      11/28/2018 11:00 AM Bertha Nunez APRN CNP [facility] [place]        Today's Diagnoses     Encounter for gynecological examination without abnormal finding    -  1       Follow-ups after your visit        Follow-up notes from your care team     Return in about 1 year (around 11/28/2019) for Routine Visit.      Your next 10 appointments already scheduled     Nov 28, 2018 11:30 AM CST   MA SCREENING DIGITAL BILATERAL with WEMA1   [facility] [place] ([facility])    [address], [address]  [facility] [address] [phone]           How do I prepare for my exam? (Food and drink instructions) No Food and Drink Restrictions.  How do I prepare for my exam? (Other instructions) Do not use any powder, lotion or deodorant under your arms or on your breast. If you do, we will ask you to remove it before your exam.  What should I wear: Wear comfortable, two-piece clothing.  How long does the exam take: Most scans will take 15 minutes.  What should I bring: Bring any previous mammograms from other facilities or have them mailed to the breast center.  Do I need a :  No  is needed.  What do I need to tell my doctor: If you have any allergies, tell your care team.  What should I do after the exam: No restrictions, You may resume normal activities.  What is this test: This test is an x-ray of the breast to look for breast disease. The breast is pressed between two plates to flatten and spread the tissue. An X-ray is taken of the breast from different angles.  Who should I call with questions: If you have any questions, please call the Imaging Department where you will have your exam. Directions, parking instructions, and other information  "is available on our website, [email or web address].  Other information about my exam Three-dimensional (3D) mammograms are available at [facility] locations in [facility], [facility], [place], [facility] and [place]. [facility] locations include [place] and [facility] in [place].  Benefits of 3D mammograms include * Improved rate of cancer detection * Decreases your chance of having to go back for more tests, which means fewer: * \"False-positive\" results (This means that there is an abnormal area but it isn't cancer.) * Invasive testing procedures, such as a biopsy or surgery * Can provide clearer images of the breast if you have dense breast tissue.  *3D mammography is an optional exam that anyone can have with a 2D mammogram. It doesn't replace or take the place of a 2D mammogram. 2D mammograms remain an effective screening test for all women.  Not all insurance companies cover the cost of a 3D mammogram. Check with your insurance. Three-dimensional (3D) mammograms are available at [facility] locations in [facility], [facility], [facility], and [place]. Health locations include [place] and [facility] in [place]. Benefits of 3D mammograms include: - Improved rate of cancer detection - Decreases your chance of having to go back for more tests, which means fewer: - \"False-positive\" results (This means that there is an abnormal area but it isn't cancer.) - Invasive testing procedures, such as a biopsy or surgery - Can provide clearer images of the breast if you have dense breast tissue. 3D mammography is an optional exam that anyone can have with a 2D mammogram. It doesn't replace or take the place of a 2D mammogram. 2D mammograms remain an effective screening test for all women.  Not all insurance companies cover the cost of a 3D mammogram. Check with your insurance.              Who to contact     If " "you have questions or need follow up information about today's clinic visit or your schedule please contact [facility] directly at [phone].  Normal or non-critical lab and imaging results will be communicated to you by MyChart, letter or phone within 4 business days after the clinic has received the results. If you do not hear from us within 7 days, please contact the clinic through Skipohart or phone. If you have a critical or abnormal lab result, we will notify you by phone as soon as possible.  Submit refill requests through SnapNames or call your pharmacy and they will forward the refill request to us. Please allow 3 business days for your refill to be completed.          Additional Information About Your Visit        SkipoharMapkin Information     SnapNames gives you secure access to your electronic health record. If you see a primary care provider, you can also send messages to your care team and make appointments. If you have questions, please call your primary care clinic.  If you do not have a primary care provider, please call [phone] and they will assist you.        Care EveryWhere ID     This is your Care EveryWhere ID. This could be used by other organizations to access your [place] medical records  [id number]        Your Vitals Were     Pulse Height Last Period BMI (Body Mass Index)          68 5' 11\" (1.803 m) 11/28/2018 28.87 kg/m2         Blood Pressure from Last 3 Encounters:   11/28/18 116/62   01/09/18 104/66   11/30/17 113/67    Weight from Last 3 Encounters:   11/28/18 207 lb (93.9 kg)   01/09/18 203 lb (92.1 kg)   11/30/17 200 lb (90.7 kg)              We Performed the Following     HPV High Risk Types DNA Cervical     Pap imaged thin layer screen with HPV - recommended age 30 - 65        Primary Care Provider Office Phone # Fax #    Erick Mckeon [phone] [address]  [place] [address]        Equal Access to Services     " RAD[facility] Center[facility] : Hadii aad ku sveta Dunham, waaxda luqadaha, qaybta kaalmada adedavid, lloyd shelby demetriacharles fergusonrobsonlavon gonzalez . So [facility] [phone].    ATENCIÓN: Si habla eyad, tiene a salcedo disposición servicios gratuitos de asistencia lingüística. Llame al [phone].    We comply with applicable federal civil rights laws and [place] laws. We do not discriminate on the basis of race, color, national origin, age, disability, sex, sexual orientation, or gender identity.            Thank you!     Thank you for choosing [facility]  for your care. Our goal is always to provide you with excellent care. Hearing back from our patients is one way we can continue to improve our services. Please take a few minutes to complete the written survey that you may receive in the mail after your visit with us. Thank you!             Your Updated Medication List - Protect others around you: Learn how to safely use, store and throw away your medicines at www.disposemymeds.org.          This list is accurate as of 11/28/18 11:29 AM.  Always use your most recent med list.                   Brand Name Dispense Instructions for use Diagnosis    albuterol 108 (90 Base) MCG/ACT inhaler    PROAIR HFA/PROVENTIL HFA/VENTOLIN HFA    1 Inhaler    Inhale 2 puffs into the lungs every 6 hours as needed for shortness of breath / dyspnea or wheezing    Acute bronchitis, unspecified organism       CYTOMEL PO      Take 5 mcg by mouth Take two tablets daily        Multi-vitamin tablet           SUMAtriptan 25 MG tablet    IMITREX    18 tablet    TAKE 1 TO 2 TABLETS (25-50MG) BY MOUTH AT ONSET OF HEADACHE FOR MIGRAINE. MAY REPEAT IN 2 HOURS. MAX 8 TABLETS IN 24 HOURS    Migraine with aura and without status migrainosus, not intractable       SYNTHROID PO      Take 12.5 mcg by mouth        Vitamin C 500 MG Caps      Take 1,000 mg by mouth        vitamin D3 1000 units (25 mcg) tablet    CHOLECALCIFEROL     Take 1,000 Units by  mouth        ZOFRAN PO

## 2018-11-28 NOTE — PROGRESS NOTES
Antoinette is a 48 year old No obstetric history on file. female who presents for annual exam.     Besides routine health maintenance, she has no other health concerns today .    HPI:here for annual exam.  Had ablation 12/2017.  Had no cycle for a few months, now has a 2 day moderate cycle usually every month.  All blood work done with PCP.  No concerns today.    The patient's PCP is  Erick Mckeon MD, MD.        GYNECOLOGIC HISTORY:    Patient's last menstrual period was 11/28/2018.  Her current contraception method is: non .  She  reports that she has never smoked. She has never used smokeless tobacco.    Patient is sexually active.  STD testing offered?  Declined  Last PHQ-9 score on record =   PHQ-9 SCORE 11/28/2018   PHQ-9 Total Score 8     Last GAD7 score on record =   ARACELI-7 SCORE 11/28/2018   Total Score 2     Alcohol Score = 2    HEALTH MAINTENANCE:  Cholesterol: 6/10/15Total= 135, Triglycerides=57, HDL=51, LDL=75    Cholesterol   Date Value Ref Range Status   06/10/2015 135 100 - 200 mg/dL Final      Last Mammo: one year ago, Result: normal, Next Mammo: today   Pap:   Lab Results   Component Value Date    PAP NIL 01/24/2017    PAP Negative 04/01/2015      Colonoscopy:  never Result: not applicable, Next Colonoscopy: due age 49.   Dexa:  NA    Health maintenance updated:  yes    HISTORY:  Obstetric History     No data available          Patient Active Problem List   Diagnosis     Hypothyroidism     Migraine with aura     Past Surgical History:   Procedure Laterality Date     AS HYSTEROSCOPY, SURGICAL; W/ ENDOMETRIAL ABLATION, ANY METHOD      [place]     BLADDER SURGERY  08/2015    [facility]     SLING BLADDER SUSPENSION WITH FASCIA GREGG        Social History   Substance Use Topics     Smoking status: Never Smoker     Smokeless tobacco: Never Used     Alcohol use 0.0 oz/week     0 Standard drinks or equivalent per week      Problem (# of Occurrences) Relation (Name,Age of Onset)    Hypertension (1) Father  "   Type 2 Diabetes (1) Father            Current Outpatient Prescriptions   Medication Sig     Ascorbic Acid (VITAMIN C) 500 MG CAPS Take 1,000 mg by mouth     cholecalciferol (VITAMIN D) 1000 UNIT tablet Take 1,000 Units by mouth     Levothyroxine Sodium (SYNTHROID PO) Take 12.5 mcg by mouth     Liothyronine Sodium (CYTOMEL PO) Take 5 mcg by mouth Take two tablets daily     multivitamin, therapeutic with minerals (MULTI-VITAMIN) TABS      Ondansetron HCl (ZOFRAN PO)      SUMAtriptan (IMITREX) 25 MG tablet TAKE 1 TO 2 TABLETS (25-50MG) BY MOUTH AT ONSET OF HEADACHE FOR MIGRAINE. MAY REPEAT IN 2 HOURS. MAX 8 TABLETS IN 24 HOURS     albuterol (PROAIR HFA/PROVENTIL HFA/VENTOLIN HFA) 108 (90 BASE) MCG/ACT Inhaler Inhale 2 puffs into the lungs every 6 hours as needed for shortness of breath / dyspnea or wheezing (Patient not taking: Reported on 1/9/2018)     No current facility-administered medications for this visit.      No Known Allergies    Past medical, surgical, social and family histories were reviewed and updated in EPIC.    ROS:   12 point review of systems negative other than symptoms noted below.  Endocrine: Cold Intolerance and Decreased Libido    EXAM:  /62  Pulse 68  Ht 5' 11\" (1.803 m)  Wt 207 lb (93.9 kg)  LMP 11/28/2018  BMI 28.87 kg/m2   BMI: Body mass index is 28.87 kg/(m^2).    PHYSICAL EXAM:  Constitutional:  Appearance: Well nourished, well developed, alert, in no acute distress  Neck:  Lymph Nodes:  No lymphadenopathy present    Thyroid:  Gland size normal, nontender, no nodules or masses present  on palpation  Chest:  Respiratory Effort:  Breathing unlabored  Cardiovascular:    Heart: Auscultation:  Regular rate, normal rhythm, no murmurs present  Breasts: Inspection of Breasts:  No lymphadenopathy present., Palpation of Breasts and Axillae:  No masses present on palpation, no breast tenderness., Axillary Lymph Nodes:  No lymphadenopathy present. and No nodularity, asymmetry or nipple " discharge bilaterally.  Gastrointestinal:   Abdominal Examination:  Abdomen nontender to palpation, tone normal without rigidity or guarding, no masses present, umbilicus without lesions   Liver and Spleen:  No hepatomegaly present, liver nontender to palpation    Hernias:  No hernias present  Lymphatic: Lymph Nodes:  No other lymphadenopathy present  Skin:  General Inspection:  No rashes present, no lesions present, no areas of  discoloration    Genitalia and Groin:  No rashes present, no lesions present, no areas of  discoloration, no masses present  Neurologic/Psychiatric:    Mental Status:  Oriented X3     Pelvic Exam:  External Genitalia:     Normal appearance for age, no discharge present, no tenderness present, no inflammatory lesions present, color normal  Vagina:     Normal vaginal vault without central or paravaginal defects, no discharge present, no inflammatory lesions present, no masses present  Bladder:     Nontender to palpation  Urethra:   Urethral Body:  Urethra palpation normal, urethra structural support normal   Urethral Meatus:  No erythema or lesions present  Cervix:     Appearance healthy, no lesions present, nontender to palpation, no bleeding present  Uterus:     Uterus: firm, normal sized and nontender, anteverted in position.   Adnexa:     No adnexal tenderness present, no adnexal masses present  Perineum:     Perineum within normal limits, no evidence of trauma, no rashes or skin lesions present  Anus:     Anus within normal limits, no hemorrhoids present  Inguinal Lymph Nodes:     No lymphadenopathy present  Pubic Hair:     Normal pubic hair distribution for age  Genitalia and Groin:     No rashes present, no lesions present, no areas of discoloration, no masses present      COUNSELING:   Reviewed preventive health counseling, as reflected in patient instructions       Regular exercise       Healthy diet/nutrition       (Suni)menopause management    BMI: Body mass index is 28.87  kg/(m^2).      ASSESSMENT:  48 year old female with satisfactory annual exam.    ICD-10-CM    1. Encounter for gynecological examination without abnormal finding Z01.419 Pap imaged thin layer screen with HPV - recommended age 30 - 65     HPV High Risk Types DNA Cervical       PLAN:  Normal Gyn exam.  Counseled patient on pap guidelines and patient is not comfortable not having a pap yearly.  Requesting a pap be done  Yearly.  Return prn or 1 year for annual and pap.    PHILOMENA Borjas CNP

## 2018-11-29 ASSESSMENT — ANXIETY QUESTIONNAIRES: GAD7 TOTAL SCORE: 2

## 2018-11-30 LAB
COPATH REPORT: NORMAL
PAP: NORMAL

## 2018-12-04 LAB
FINAL DIAGNOSIS: ABNORMAL
HPV HR 12 DNA CVX QL NAA+PROBE: POSITIVE
HPV16 DNA SPEC QL NAA+PROBE: NEGATIVE
HPV18 DNA SPEC QL NAA+PROBE: NEGATIVE
SPECIMEN DESCRIPTION: ABNORMAL
SPECIMEN SOURCE CVX/VAG CYTO: ABNORMAL

## 2018-12-04 NOTE — PROGRESS NOTES
11/28/18 NIL pap, + HR HPV (not 16/18). Plan: cotest in 1 year  12/4/18 left msg/advised of result and follow up (lks)  12/3/19 NIL pap, Neg HPV.  Pap in 1 year per provider. Tracking no longer indicated

## 2019-07-29 ENCOUNTER — OFFICE VISIT (OUTPATIENT)
Dept: FAMILY MEDICINE | Facility: CLINIC | Age: 49
End: 2019-07-29
Payer: COMMERCIAL

## 2019-07-29 VITALS
WEIGHT: 205 LBS | OXYGEN SATURATION: 97 % | HEART RATE: 80 BPM | BODY MASS INDEX: 28.7 KG/M2 | SYSTOLIC BLOOD PRESSURE: 112 MMHG | HEIGHT: 71 IN | TEMPERATURE: 97.9 F | DIASTOLIC BLOOD PRESSURE: 74 MMHG

## 2019-07-29 DIAGNOSIS — Z13.220 SCREENING FOR HYPERLIPIDEMIA: ICD-10-CM

## 2019-07-29 DIAGNOSIS — G43.109 MIGRAINE WITH AURA AND WITHOUT STATUS MIGRAINOSUS, NOT INTRACTABLE: ICD-10-CM

## 2019-07-29 DIAGNOSIS — R73.01 ELEVATED FASTING GLUCOSE: ICD-10-CM

## 2019-07-29 DIAGNOSIS — Z01.00 ENCOUNTER FOR VISION SCREENING: ICD-10-CM

## 2019-07-29 DIAGNOSIS — Z12.83 SKIN CANCER SCREENING: ICD-10-CM

## 2019-07-29 DIAGNOSIS — Z13.1 SCREENING FOR DIABETES MELLITUS: ICD-10-CM

## 2019-07-29 DIAGNOSIS — Z00.00 ROUTINE GENERAL MEDICAL EXAMINATION AT A HEALTH CARE FACILITY: Primary | ICD-10-CM

## 2019-07-29 DIAGNOSIS — Z13.21 ENCOUNTER FOR VITAMIN DEFICIENCY SCREENING: ICD-10-CM

## 2019-07-29 LAB
ANION GAP SERPL CALCULATED.3IONS-SCNC: 8 MMOL/L (ref 3–14)
BUN SERPL-MCNC: 12 MG/DL (ref 7–30)
CALCIUM SERPL-MCNC: 8.9 MG/DL (ref 8.5–10.1)
CHLORIDE SERPL-SCNC: 106 MMOL/L (ref 94–109)
CHOLEST SERPL-MCNC: 169 MG/DL
CO2 SERPL-SCNC: 26 MMOL/L (ref 20–32)
CREAT SERPL-MCNC: 0.74 MG/DL (ref 0.52–1.04)
ERYTHROCYTE [DISTWIDTH] IN BLOOD BY AUTOMATED COUNT: 13.4 % (ref 10–15)
GFR SERPL CREATININE-BSD FRML MDRD: >90 ML/MIN/{1.73_M2}
GLUCOSE SERPL-MCNC: 104 MG/DL (ref 70–99)
HCT VFR BLD AUTO: 43.9 % (ref 35–47)
HDLC SERPL-MCNC: 60 MG/DL
HGB BLD-MCNC: 14.8 G/DL (ref 11.7–15.7)
LDLC SERPL CALC-MCNC: 89 MG/DL
MCH RBC QN AUTO: 28.8 PG (ref 26.5–33)
MCHC RBC AUTO-ENTMCNC: 33.7 G/DL (ref 31.5–36.5)
MCV RBC AUTO: 86 FL (ref 78–100)
NONHDLC SERPL-MCNC: 109 MG/DL
PLATELET # BLD AUTO: 223 10E9/L (ref 150–450)
POTASSIUM SERPL-SCNC: 4.4 MMOL/L (ref 3.4–5.3)
RBC # BLD AUTO: 5.13 10E12/L (ref 3.8–5.2)
SODIUM SERPL-SCNC: 140 MMOL/L (ref 133–144)
TRIGL SERPL-MCNC: 101 MG/DL
WBC # BLD AUTO: 8.5 10E9/L (ref 4–11)

## 2019-07-29 PROCEDURE — 85027 COMPLETE CBC AUTOMATED: CPT | Performed by: INTERNAL MEDICINE

## 2019-07-29 PROCEDURE — 83036 HEMOGLOBIN GLYCOSYLATED A1C: CPT | Performed by: INTERNAL MEDICINE

## 2019-07-29 PROCEDURE — 36415 COLL VENOUS BLD VENIPUNCTURE: CPT | Performed by: INTERNAL MEDICINE

## 2019-07-29 PROCEDURE — 80061 LIPID PANEL: CPT | Performed by: INTERNAL MEDICINE

## 2019-07-29 PROCEDURE — 80048 BASIC METABOLIC PNL TOTAL CA: CPT | Performed by: INTERNAL MEDICINE

## 2019-07-29 PROCEDURE — 82306 VITAMIN D 25 HYDROXY: CPT | Performed by: INTERNAL MEDICINE

## 2019-07-29 PROCEDURE — 99396 PREV VISIT EST AGE 40-64: CPT | Performed by: INTERNAL MEDICINE

## 2019-07-29 RX ORDER — SUMATRIPTAN 25 MG/1
TABLET, FILM COATED ORAL
Qty: 18 TABLET | Refills: 0 | Status: SHIPPED | OUTPATIENT
Start: 2019-07-29 | End: 2019-10-23

## 2019-07-29 ASSESSMENT — MIFFLIN-ST. JEOR: SCORE: 1655.12

## 2019-07-29 NOTE — PROGRESS NOTES
SUBJECTIVE:   CC: Antoinette Deleon is an 48 year old woman who presents for preventive health visit.     Healthy Habits:    Do you get at least three servings of calcium containing foods daily (dairy, green leafy vegetables, etc.)? yes    Amount of exercise or daily activities, outside of work: Walking 30 mins a day    Problems taking medications regularly No    Medication side effects: No    Have you had an eye exam in the past two years? no    Do you see a dentist twice per year? yes    Do you have sleep apnea, excessive snoring or daytime drowsiness?no    Mother is having mental health issues. 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  Would like a skin check.  Worried about weight.   Sees Dr. Busch for her thyroid.      Today's PHQ-2 Score:   PHQ-2 ( 1999 [employer]) 11/28/2018 11/25/2018   Q1: Little interest or pleasure in doing things 1 1   Q2: Feeling down, depressed or hopeless 1 1   PHQ-2 Score 2 2   Q1: Little interest or pleasure in doing things - Several days   Q2: Feeling down, depressed or hopeless - Several days   PHQ-2 Score - 2       Abuse: Current or Past(Physical, Sexual or Emotional)- No  Do you feel safe in your environment? Yes    Social History     Tobacco Use     Smoking status: Never Smoker     Smokeless tobacco: Never Used   Substance Use Topics     Alcohol use: Yes     Alcohol/week: 0.0 oz     If you drink alcohol do you typically have >3 drinks per day or >7 drinks per week? Yes - AUDIT SCORE:                          Reviewed orders with patient.  Reviewed health maintenance and updated orders accordingly - Yes  BP Readings from Last 3 Encounters:   07/29/19 112/74   11/28/18 116/62   01/09/18 104/66    Wt Readings from Last 3 Encounters:   07/29/19 93 kg (205 lb)   11/28/18 93.9 kg (207 lb)   01/09/18 92.1 kg (203 lb)                    Mammogram Screening: Patient under age 50, mutual decision reflected in health maintenance.      Pertinent mammograms are  "reviewed under the imaging tab.  History of abnormal Pap smear: YES - updated in Problem List and Health Maintenance accordingly  PAP / HPV Latest Ref Rng & Units 11/28/2018 1/24/2017 4/1/2015   PAP - NIL NIL Negative   HPV 16 DNA NEG:Negative Negative - -   HPV 18 DNA NEG:Negative Negative - -   OTHER HR HPV NEG:Negative Positive(A) - -     Reviewed and updated as needed this visit by clinical staff         Reviewed and updated as needed this visit by Provider            ROS:  CONSTITUTIONAL: NEGATIVE for fever, chills, change in weight  INTEGUMENTARU/SKIN: NEGATIVE for worrisome rashes, moles or lesions  EYES: NEGATIVE for vision changes or irritation  ENT: NEGATIVE for ear, mouth and throat problems  RESP: NEGATIVE for significant cough or SOB  BREAST: NEGATIVE for masses, tenderness or discharge  CV: NEGATIVE for chest pain, palpitations or peripheral edema  GI: NEGATIVE for nausea, abdominal pain, heartburn, or change in bowel habits  : NEGATIVE for unusual urinary or vaginal symptoms. Periods are regular.  MUSCULOSKELETAL: NEGATIVE for significant arthralgias or myalgia  NEURO: NEGATIVE for weakness, dizziness or paresthesias  PSYCHIATRIC: NEGATIVE for changes in mood or affect    OBJECTIVE:   /74 (BP Location: Left arm, Patient Position: Chair, Cuff Size: Adult Regular)   Pulse 80   Temp 97.9  F (36.6  C) (Tympanic)   Ht 1.81 m (5' 11.26\")   Wt 93 kg (205 lb)   LMP 07/21/2019   SpO2 97%   BMI 28.38 kg/m    EXAM:  GENERAL APPEARANCE: healthy, alert and no distress  EYES: Eyes grossly normal to inspection, PERRL and conjunctivae and sclerae normal  HENT: ear canals and TM's normal, nose and mouth without ulcers or lesions, oropharynx clear and oral mucous membranes moist  NECK: no adenopathy, no asymmetry, masses, or scars and thyroid normal to palpation  RESP: lungs clear to auscultation - no rales, rhonchi or wheezes  BREAST: normal without masses, tenderness or nipple discharge and no palpable " "axillary masses or adenopathy  CV: regular rate and rhythm, normal S1 S2, no S3 or S4, no murmur, click or rub, no peripheral edema and peripheral pulses strong  ABDOMEN: soft, nontender, no hepatosplenomegaly, no masses and bowel sounds normal  MS: no musculoskeletal defects are noted and gait is age appropriate without ataxia  SKIN: no suspicious lesions or rashes  NEURO: Normal strength and tone, sensory exam grossly normal, mentation intact and speech normal  PSYCH: mentation appears normal and affect normal/bright    Diagnostic Test Results:  Labs reviewed in Epic  none     ASSESSMENT/PLAN:   1. Routine general medical examination at a health care facility  Pap smear and mammogram due in November. [facility]     2. Encounter for vision screening  - OPHTHALMOLOGY ADULT REFERRAL    3. Migraine with aura and without status migrainosus, not intractable  - SUMAtriptan (IMITREX) 25 MG tablet; TAKE 1 TO 2 TABLETS (25-50MG) BY MOUTH AT ONSET OF HEADACHE FOR MIGRAINE. MAY REPEAT IN 2 HOURS. MAX 8 TABLETS IN 24 HOURS  Dispense: 18 tablet; Refill: 0    4. Screening for hyperlipidemia  - Lipid panel reflex to direct LDL Fasting  - CBC with platelets    5. Screening for diabetes mellitus  - Basic metabolic panel    6. Encounter for vitamin deficiency screening  - Vitamin D Deficiency    7. Skin cancer screening  - SKIN CARE REFERRAL    COUNSELING:   Reviewed preventive health counseling, as reflected in patient instructions       Regular exercise       Healthy diet/nutrition       Contraception       Osteoporosis Prevention/Bone Health       Colon cancer screening    Estimated body mass index is 28.87 kg/m  as calculated from the following:    Height as of 11/28/18: 1.803 m (5' 11\").    Weight as of 11/28/18: 93.9 kg (207 lb).    Weight management plan: Discussed healthy diet and exercise guidelines     reports that she has never smoked. She has never used smokeless tobacco.      Counseling " Resources:  ATP IV Guidelines  Pooled Cohorts Equation Calculator  Breast Cancer Risk Calculator  FRAX Risk Assessment  ICSI Preventive Guidelines  Dietary Guidelines for Americans, 2010  USDA's MyPlate  ASA Prophylaxis  Lung CA Screening    Cindi Cho [place]  [facility] [place]

## 2019-07-30 LAB
DEPRECATED CALCIDIOL+CALCIFEROL SERPL-MC: 54 UG/L (ref 20–75)
HBA1C MFR BLD: 5.2 % (ref 0–5.6)

## 2019-08-18 ENCOUNTER — E-VISIT (OUTPATIENT)
Dept: FAMILY MEDICINE | Facility: CLINIC | Age: 49
End: 2019-08-18
Payer: COMMERCIAL

## 2019-08-18 DIAGNOSIS — R05.9 COUGH: Primary | ICD-10-CM

## 2019-08-18 PROCEDURE — 99444 ZZC PHYSICIAN ONLINE EVALUATION & MANAGEMENT SERVICE: CPT | Performed by: INTERNAL MEDICINE

## 2019-08-19 RX ORDER — CODEINE PHOSPHATE AND GUAIFENESIN 10; 100 MG/5ML; MG/5ML
1-2 SOLUTION ORAL EVERY 6 HOURS PRN
Qty: 180 ML | Refills: 0 | Status: SHIPPED | OUTPATIENT
Start: 2019-08-19 | End: 2019-12-03

## 2019-08-19 RX ORDER — DOXYCYCLINE HYCLATE 100 MG
100 TABLET ORAL 2 TIMES DAILY
Qty: 14 TABLET | Refills: 0 | Status: SHIPPED | OUTPATIENT
Start: 2019-08-19 | End: 2019-12-03

## 2019-08-23 ENCOUNTER — OFFICE VISIT (OUTPATIENT)
Dept: FAMILY MEDICINE | Facility: CLINIC | Age: 49
End: 2019-08-23
Payer: COMMERCIAL

## 2019-08-23 VITALS — DIASTOLIC BLOOD PRESSURE: 74 MMHG | SYSTOLIC BLOOD PRESSURE: 102 MMHG

## 2019-08-23 DIAGNOSIS — L81.4 LENTIGINES: ICD-10-CM

## 2019-08-23 DIAGNOSIS — L57.8 SOLAR ELASTOSIS: ICD-10-CM
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
## 2019-08-23 DIAGNOSIS — D18.01 CHERRY ANGIOMA: ICD-10-CM

## 2019-08-23 DIAGNOSIS — L57.0 ACTINIC KERATOSES: ICD-10-CM

## 2019-08-23 DIAGNOSIS — D22.9 MULTIPLE NEVI: ICD-10-CM

## 2019-08-23 PROCEDURE — 99213 OFFICE O/P EST LOW 20 MIN: CPT | Mod: 25 | Performed by: PHYSICIAN ASSISTANT

## 2019-08-23 PROCEDURE — 17000 DESTRUCT PREMALG LESION: CPT | Performed by: PHYSICIAN ASSISTANT

## 2019-08-23 NOTE — LETTER
8/23/2019         RE: Antoinette Deleon  [address]   [place] [address]        Dear Colleague,    Thank you for [place] your [place], [place] [facility] [place] see a copy of my visit note below.    HPI:  Antoinette Deleon is a 48 year old female patient here today for spot on chest .  Patient states this has been present for unsure.  Patient reports the following symptoms: scaly .  Patient reports the following previous treatments: none.  Patient reports the following modifying factors: none.  Associated symptoms: none.  Patient has no other skin complaints today.  Remainder of the HPI, Meds, PMH, Allergies, FH, and SH was reviewed in chart.    Pertinent Hx:   No personal or family history of skin cancer    Past Medical History:   Diagnosis Date     Cervical high risk HPV (human papillomavirus) test positive 11/28/2018 11/28/18 NIL pap, + HR HPV (not 16/18). Plan: cotest in 1 year     Menorrhagia        Past Surgical History:   Procedure Laterality Date     AS HYSTEROSCOPY, SURGICAL; W/ ENDOMETRIAL ABLATION, ANY METHOD      Elicia     BLADDER SURGERY  08/2015    [facility] [place]     SLING BLADDER SUSPENSION WITH FASCIA GREGG          Family History   Problem Relation Age of Onset     Diabetes Type 2  Father      Hypertension Father        Social History     Socioeconomic History     Marital status:      Spouse name:      Number of children: 4     Years of education: Not on file     Highest education level: Not on file   Occupational History     Occupation: consultant   Social Needs     Financial resource strain: Not on file     Food insecurity:     Worry: Not on file     Inability: Not on file     Transportation needs:     Medical: Not on file     Non-medical: Not on file   Tobacco Use     Smoking status: Never Smoker     Smokeless tobacco: Never Used   Substance and Sexual Activity     Alcohol use: Yes     Alcohol/week: 0.0 oz     Drug use: No      Sexual activity: Yes     Partners: Male     Birth control/protection: Female Surgical   Lifestyle     Physical activity:     Days per week: Not on file     Minutes per session: Not on file     Stress: Not on file   Relationships     Social connections:     Talks on phone: Not on file     Gets together: Not on file     Attends [employer] service: Not on file     Active member of club or organization: Not on file     Attends meetings of clubs or organizations: Not on file     Relationship status: Not on file     Intimate partner violence:     Fear of current or ex partner: Not on file     Emotionally abused: Not on file     Physically abused: Not on file     Forced sexual activity: Not on file   Other Topics Concern     Parent/sibling w/ CABG, MI or angioplasty before 65F 55M? 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       Outpatient Encounter Medications as of 8/23/2019   Medication Sig Dispense Refill     Ascorbic Acid (VITAMIN C) 500 MG CAPS Take 1,000 mg by mouth       cholecalciferol (VITAMIN D) 1000 UNIT tablet Take 1,000 Units by mouth       doxycycline hyclate (VIBRA-TABS) 100 MG tablet Take 1 tablet (100 mg) by mouth 2 times daily 14 tablet 0     guaiFENesin-codeine (ROBITUSSIN AC) 100-10 MG/5ML solution Take 5-10 mLs by mouth every 6 hours as needed for cough 180 mL 0     Levothyroxine Sodium (SYNTHROID PO) Take 12.5 mcg by mouth       Liothyronine Sodium (CYTOMEL PO) Take 5 mcg by mouth Take two tablets daily       multivitamin, therapeutic with minerals (MULTI-VITAMIN) TABS        Ondansetron HCl (ZOFRAN PO)        SUMAtriptan (IMITREX) 25 MG tablet TAKE 1 TO 2 TABLETS (25-50MG) BY MOUTH AT ONSET OF HEADACHE FOR MIGRAINE. MAY REPEAT IN 2 HOURS. MAX 8 TABLETS IN 24 HOURS 18 tablet 0     No facility-administered encounter medications on file as of 8/23/2019.        Review Of Systems:  Skin: As above  Eyes:  negative  Ears/Nose/Throat: negative  Respiratory: No shortness of breath, dyspnea on exertion, cough, or hemoptysis  Cardiovascular: negative  Gastrointestinal: negative  Genitourinary: negative  Musculoskeletal: negative  Neurologic: negative  Psychiatric: negative  Hematologic/Lymphatic/Immunologic: negative  Endocrine: negative      Objective:     /74   Eyes: Conjunctivae/lids: Normal   ENT: Lips:  Normal  MSK: Normal  Cardiovascular: Peripheral edema none  Pulm: Breathing Normal  Neuro/Psych: Orientation: Normal; Mood/Affect: Normal, NAD, WDWN  Pt accompanied by: self  Following areas examined: Scalp, face, eyelids, lips, neck, chest, abdomen, back, buttocks, and R&L upper and lower extremities. Pt defers exam of groin.   Arguello skin type:ii   Findings:  Red smooth well-defined macules on trunk and extremities.  Well circumscribed macules with symmetric color distribution on trunk and extremities.  Tan WD smooth macules on face, neck, trunk, and extremities.  Pink scaly macule/s x 1 on right chest  Rhytides, hypo/hyperpigmentation, and atrophy of face and chest  Few pink excoriated papules on left flank  Assessment and Plan:     1) Cherry angiomas, Benign nevi, Lentigines     I discussed the specifics of tumor, prognosis, and genetics of benign lesions.  I explained that treatment of these lesions would be purely cosmetic and not medically neccessary.  I discussed with patient different removal options including excision, cryotherapy, cautery and /or laser.  Lesion may recur and/or may not completely resolve. May need additional treatment.     2) Actinic keratoses x 1 and solar elastosis    LN2 for 5 seconds x 2. Discussed AE include hypopigmentation (white spot) and recurrence. Follow up in 2-3 months to recheck lesions. There is a risk of AKs developing into a SCC.   Treatment options include LN2 vs PDT vs Efudex. Pt elected LN2    3) arthropod bites  Per pt resolving. Defers treatment  Signs and  Symptoms of non-melanoma skin cancer and ABCDEs of melanoma reviewed with patient. Patient encouraged to perform monthly self skin exams and educated on how to perform them. UV precautions reviewed with patient. Patient was asked about new or changing moles/lesions on body.   Wear a sunscreen with at least SPF 30 on your face, ears, neck and V of the chest daily. Wear sunscreen on other areas of the body if those areas are exposed to the sun throughout the day. Sunscreens can contain physical and/or chemical blockers. Physical blockers are less likely to clog pores, these include zinc oxide and titanium dioxide. Reapply every two hour and after swimming. Sunscreen examples include Neutrogena, CeraVe, Blue Lizard, Elta MD and many others.    Proper skin care from [facility]:    -Eliminate harsh soaps as they strip the natural oils from the skin, often resulting in dry itchy skin ( i.e. Dial, Zest, [place] of [place] Spring)  -Use mild soaps such as Cetaphil or Dove Sensitive Skin in the shower. You do not need to use soap on arms, legs, and trunk every time you shower unless visibly soiled.   -Avoid hot or cold showers.  -After showering, lightly dry off and apply moisturizing within 2-3 minutes. This will help trap moisture in the skin.   -Aggressive use of a moisturizer at least 1-2 times a day to the entire body (including -Vanicream, Cetaphil, Aquaphor or Cerave) and moisturize hands after every washing.  -We recommend using moisturizers that come in a tub that needs to be scooped out, not a pump. This has more of an oil base. It will hold moisture in your skin much better than a water base moisturizer. The above recommended are non-pore clogging.               Follow up in yearly FBE. 2-3 months if ak not resolved.       Again, thank you for allowing me to participate in the care of your patient.        Sincerely,        Anjali Chanel PA-C

## 2019-08-23 NOTE — PATIENT INSTRUCTIONS
Proper skin care from [facility]:    -Eliminate harsh soaps as they strip the natural oils from the skin, often resulting in dry itchy skin ( i.e. Dial, Zest, [place] Spring)  -Use mild soaps such as Cetaphil or Dove Sensitive Skin in the shower. You do not need to use soap on arms, legs, and trunk every time you shower unless visibly soiled.   -Avoid hot or cold showers.  -After showering, lightly dry off and apply moisturizing within 2-3 minutes. This will help trap moisture in the skin.   -Aggressive use of a moisturizer at least 1-2 times a day to the entire body (including -Vanicream, Cetaphil, Aquaphor or Cerave) and moisturize hands after every washing.  -We recommend using moisturizers that come in a tub that needs to be scooped out, not a pump. This has more of an oil base. It will hold moisture in your skin much better than a water base moisturizer. The above recommended are non-pore clogging.      Wear a sunscreen with at least SPF 30 on your face, ears, neck and V of the chest daily. Wear sunscreen on other areas of the body if those areas are exposed to the sun throughout the day. Sunscreens can contain physical and/or chemical blockers. Physical blockers are less likely to clog pores, these include zinc oxide and titanium dioxide. Reapply every two hour and after swimming. Sunscreen examples include Neutrogena, CeraVe, Blue Lizard, Elta MD and many others.    UV radiation  UVA radiation remains constant throughout the day and throughout the year. It is a longer wavelength than UVB and therefore penetrates deeper into the skin leading to immediate and delayed tanning, photoaging, and skin cancer. 70-80% of UVA and UVB radiation occurs between the hours of 10am-2pm.  UVB radiation  UVB radiation causes the most harmful effects and is more significant during the summer months. However, snow and ice can reflect UVB radiation leading to skin damage during the winter months as well. UVB radiation is  responsible for tanning, burning, inflammation, delayed erythema (pinkness), pigmentation (brown spots), and skin cancer.       WOUND CARE INSTRUCTIONS  FOR CRYOSURGERY        This area treated with liquid nitrogen will form a blister. You do not need to bandage the area until after the blister forms and breaks (which may be a few days).  When the blister breaks, begin daily dressing changes as follows:    1) Clean and dry the area with tap water using clean Q-tip or sterile gauze pad.    2) Apply Polysporin ointment or Bacitracin ointment over entire wound.  Do NOT use Neosporin ointment.    3) Cover the wound with a band-aid or sterile non-stick gauze pad and micropore paper tape.      REPEAT THESE INSTRUCTIONS AT LEAST ONCE A DAY UNTIL THE WOUND HAS COMPLETELY HEALED.        It is an old wives tale that a wound heals better when it is exposed to air and allowed to dry out. The wound will heal faster with a better cosmetic result if it is kept moist with ointment and covered with a bandage.  Do not let the wound dry out.      Supplies Needed:     *Cotton tipped applicators (Q-tips)   *Polysporin ointment or Bacitracin ointment (NOT NEOSPORIN)   *Band-aids, or non stick gauze pads and micropore paper tape    PATIENT INFORMATION    During the healing process you will notice a number of changes. All wounds develop a small halo of redness surrounding the wound.  This means healing is occurring. Severe itching with extensive redness usually indicates sensitivity to the ointment or bandage tape used to dress the wound.  You should call our office if this develops.      Swelling and/or discoloration around your surgical site is common, particularly when performed around the eye.    All wounds normally drain.  The larger the wound the more drainage there will be.  After 7-10 days, you will notice the wound beginning to shrink and new skin will begin to grow.  The wound is healed when you can see skin has formed over the  entire area.  A healed wound has a healthy, shiny look to the surface and is red to dark pink in color to normalize.  Wounds may take approximately 4-6 weeks to heal.  Larger wounds may take 6-8 weeks.  After the wound is healed you may discontinue dressing changes.    You may experience a sensation of tightness as your wound heals. This is normal and will gradually subside.    Your healed wound may be sensitive to temperature changes. This sensitivity improves with time, but if you re having a lot of discomfort, try to avoid temperature extremes.    Patients frequently experience itching after their wound appears to have healed because of the continue healing under the skin.  Plain Vaseline will help relieve the itching.

## 2019-08-23 NOTE — PROGRESS NOTES
HPI:  Antoinette Deleon is a 48 year old female patient here today for spot on chest .  Patient states this has been present for unsure.  Patient reports the following symptoms: scaly .  Patient reports the following previous treatments: none.  Patient reports the following modifying factors: none.  Associated symptoms: none.  Patient has no other skin complaints today.  Remainder of the HPI, Meds, PMH, Allergies, FH, and SH was reviewed in chart.    Pertinent Hx:   No personal or family history of skin cancer    Past Medical History:   Diagnosis Date     Cervical high risk HPV (human papillomavirus) test positive 11/28/2018 11/28/18 NIL pap, + HR HPV (not 16/18). Plan: cotest in 1 year     Menorrhagia        Past Surgical History:   Procedure Laterality Date     AS HYSTEROSCOPY, SURGICAL; W/ ENDOMETRIAL ABLATION, ANY METHOD      [place]    [facility] [place] BLADDER SUSPENSION WITH FASCIA GREGG          Family History   Problem Relation Age of Onset     Diabetes Type 2  Father      Hypertension Father        Social History     Socioeconomic History     Marital status:      Spouse name:      Number of children: 4     Years of education: Not on file     Highest education level: Not on file   Occupational History     Occupation: consultant   Social Needs     Financial resource strain: Not on file     Food insecurity:     Worry: Not on file     Inability: Not on file     Transportation needs:     Medical: Not on file     Non-medical: Not on file   Tobacco Use     Smoking status: Never Smoker     Smokeless tobacco: Never Used   Substance and Sexual Activity     Alcohol use: Yes     Alcohol/week: 0.0 oz     Drug use: No     Sexual activity: Yes     Partners: Male     Birth control/protection: Female Surgical   Lifestyle     Physical activity:     Days per week: Not on file     Minutes per session: Not on file     Stress: Not on file   Relationships     Social connections:      Talks on phone: Not on file     Gets together: Not on file     Attends Ep[employer] service: Not on file     Active member of club or organization: Not on file     Attends meetings of clubs or organizations: Not on file     Relationship status: Not on file     Intimate partner violence:     Fear of current or ex partner: Not on file     Emotionally abused: Not on file     Physically abused: Not on file     Forced sexual activity: Not on file   Other Topics Concern     Parent/sibling w/ CABG, MI or angioplasty before 65F 55M? 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       Outpatient Encounter Medications as of 8/23/2019   Medication Sig Dispense Refill     Ascorbic Acid (VITAMIN C) 500 MG CAPS Take 1,000 mg by mouth       cholecalciferol (VITAMIN D) 1000 UNIT tablet Take 1,000 Units by mouth       doxycycline hyclate (VIBRA-TABS) 100 MG tablet Take 1 tablet (100 mg) by mouth 2 times daily 14 tablet 0     guaiFENesin-codeine (ROBITUSSIN AC) 100-10 MG/5ML solution Take 5-10 mLs by mouth every 6 hours as needed for cough 180 mL 0     Levothyroxine Sodium (SYNTHROID PO) Take 12.5 mcg by mouth       Liothyronine Sodium (CYTOMEL PO) Take 5 mcg by mouth Take two tablets daily       multivitamin, therapeutic with minerals (MULTI-VITAMIN) TABS        Ondansetron HCl (ZOFRAN PO)        SUMAtriptan (IMITREX) 25 MG tablet TAKE 1 TO 2 TABLETS (25-50MG) BY MOUTH AT ONSET OF HEADACHE FOR MIGRAINE. MAY REPEAT IN 2 HOURS. MAX 8 TABLETS IN 24 HOURS 18 tablet 0     No facility-administered encounter medications on file as of 8/23/2019.        Review Of Systems:  Skin: As above  Eyes: negative  Ears/Nose/Throat: negative  Respiratory: No shortness of breath, dyspnea on exertion, cough, or hemoptysis  Cardiovascular: negative  Gastrointestinal: negative  Genitourinary: negative  Musculoskeletal: negative  Neurologic: negative  Psychiatric:  negative  Hematologic/Lymphatic/Immunologic: negative  Endocrine: negative      Objective:     /74   Eyes: Conjunctivae/lids: Normal   ENT: Lips:  Normal  MSK: Normal  Cardiovascular: Peripheral edema none  Pulm: Breathing Normal  Neuro/Psych: Orientation: Normal; Mood/Affect: Normal, NAD, WDWN  Pt accompanied by: self  Following areas examined: Scalp, face, eyelids, lips, neck, chest, abdomen, back, buttocks, and R&L upper and lower extremities. Pt defers exam of groin.   Arguello skin type:ii   Findings:  Red smooth well-defined macules on trunk and extremities.  Well circumscribed macules with symmetric color distribution on trunk and extremities.  Tan WD smooth macules on face, neck, trunk, and extremities.  Pink scaly macule/s x 1 on right chest  Rhytides, hypo/hyperpigmentation, and atrophy of face and chest  Few pink excoriated papules on left flank  Assessment and Plan:     1) Cherry angiomas, Benign nevi, Lentigines     I discussed the specifics of tumor, prognosis, and genetics of benign lesions.  I explained that treatment of these lesions would be purely cosmetic and not medically neccessary.  I discussed with patient different removal options including excision, cryotherapy, cautery and /or laser.  Lesion may recur and/or may not completely resolve. May need additional treatment.     2) Actinic keratoses x 1 and solar elastosis    LN2 for 5 seconds x 2. Discussed AE include hypopigmentation (white spot) and recurrence. Follow up in 2-3 months to recheck lesions. There is a risk of AKs developing into a SCC.   Treatment options include LN2 vs PDT vs Efudex. Pt elected LN2    3) arthropod bites  Per pt resolving. Defers treatment  Signs and Symptoms of non-melanoma skin cancer and ABCDEs of melanoma reviewed with patient. Patient encouraged to perform monthly self skin exams and educated on how to perform them. UV precautions reviewed with patient. Patient was asked about new or changing  moles/lesions on body.   Wear a sunscreen with at least SPF 30 on your face, ears, neck and V of the chest daily. Wear sunscreen on other areas of the body if those areas are exposed to the sun throughout the day. Sunscreens can contain physical and/or chemical blockers. Physical blockers are less likely to clog pores, these include zinc oxide and titanium dioxide. Reapply every two hour and after swimming. Sunscreen examples include Neutrogena, CeraVe, Blue Lizard, Elta MD and many others.    Proper skin care from [facility]:    -Eliminate harsh soaps as they strip the natural oils from the skin, often resulting in dry itchy skin ( i.e. Dial, Zest, Nataliia Spring)  -Use mild soaps such as Cetaphil or Dove Sensitive Skin in the shower. You do not need to use soap on arms, legs, and trunk every time you shower unless visibly soiled.   -Avoid hot or cold showers.  -After showering, lightly dry off and apply moisturizing within 2-3 minutes. This will help trap moisture in the skin.   -Aggressive use of a moisturizer at least 1-2 times a day to the entire body (including -Vanicream, Cetaphil, Aquaphor or Cerave) and moisturize hands after every washing.  -We recommend using moisturizers that come in a tub that needs to be scooped out, not a pump. This has more of an oil base. It will hold moisture in your skin much better than a water base moisturizer. The above recommended are non-pore clogging.               Follow up in yearly FBE. 2-3 months if ak not resolved.

## 2019-09-02 ENCOUNTER — VIRTUAL VISIT (OUTPATIENT)
Dept: FAMILY MEDICINE | Facility: OTHER | Age: 49
End: 2019-09-02

## 2019-09-03 NOTE — PROGRESS NOTES
"Date:   Clinician: Lisa Wylie  Clinician NPI: [id number]  Patient: Antoinette Deleon  Patient : 1970  Patient Address: [address] [place] [address] [place] [address]  Patient Phone: [phone]  Visit Protocol: Eye conditions  Patient Summary:  Antoinette is a 48 year old (: 1970 ) female who initiated a Visit for conjunctivitis.  When asked the question \"Please sign me up to receive news, health information and promotions from [employer].\", Antoinette responded \"No\".    Images of her eye condition were uploaded.   Her symptoms started today and affect the right eye. The symptoms consist of eye redness, itchy eye(s), and drainage coming from the eye(s). A bright red spot similar to subconjunctival hemorrhage is also present on the eye(s).   Symptom details     Drainage: The color of the drainage coming out of her eye(s) is clear. The drainage is watery but does not cause her eyelids to be stuck shut in the morning.    Itchiness: Antoinette has seasonal allergies or hay fever.     Denied symptoms include eyelid swelling, light sensitivity, eye pain, and bumps on the eyelid. Antoinette has not experienced a decrease in vision. She does not feel feverish.   Precipitating events   Antoinette has not had a recent diagnosis of conjunctivitis. She also has not had a recent cold or ear infection, eye surgery, foreign body in the eye(s), and eye injury. It is not known if Antoinette has recently been exposed to someone with a red eye or an eye infection. She does not wear contact lenses.   Pertinent medical history  Antoinette has not ever been diagnosed with glaucoma.   Antoinette is not taking medication to treat her current symptoms.   Antoinette does not require proof of evaluation of her eye condition before returning to school, work, or .   Antoinette does not smoke or use smokeless tobacco.   She denies pregnancy and denies breastfeeding. She has menstruated in the past month.     MEDICATIONS: Synthroid oral, " ALLERGIES: NKDA  Clinician Response:  Dear Antoinette,  Based on the information provided, you most likely have viral conjunctivitis, more commonly called pink eye. There are no drops or ointments available to treat conjunctivitis caused by a virus. Specifically, antibiotics will not cure a viral infection or make it less contagious.  Medication information  Unless you are allergic to the over-the-counter medication(s) below, I recommend using:  Artificial Tears as needed. Apply 1-2 drops in the affected eye(s). These eye drops help to reduce dryness and irritation of your eyes. However, this medication does not reduce the spread of viruses that can cause eye infections.  Over-the-counter medications do not require a prescription. Ask the pharmacist if you have any questions.  Self care  To reduce the spread of the eye infection, you should not use eye makeup until the infection has fully resolved, and be sure to wash your hands at least once per hour and avoid touching the eyes as much as possible.  The following will reduce the risk for future eye infections:     Frequent handwashing    Replace towels and washcloths daily    Do not share towels and washcloths with others    Replace eye makeup used while eyes were infected    Do not use anyone else's eye makeup     Steps you can take to be as comfortable as possible:     Avoid rubbing your eyes    Apply a cool compress to the eye(s)    Take regular breaks and remember to blink regularly when reading or using a computer for long periods of time    Wear sunglasses when outside    Wear eye protection when swimming or working with chemicals    Use good lighting     When to seek care  Please make an appointment to be seen in a clinic or urgent care if any of the following occurs:     You develop new symptoms or your symptoms becomes worse.    Your symptoms have not improved after a week.      Diagnosis: Viral conjunctivitis  Diagnosis ICD: B30.8

## 2019-10-23 ENCOUNTER — MYC REFILL (OUTPATIENT)
Dept: FAMILY MEDICINE | Facility: CLINIC | Age: 49
End: 2019-10-23

## 2019-10-23 DIAGNOSIS — G43.109 MIGRAINE WITH AURA AND WITHOUT STATUS MIGRAINOSUS, NOT INTRACTABLE: ICD-10-CM

## 2019-10-23 RX ORDER — SUMATRIPTAN 25 MG/1
TABLET, FILM COATED ORAL
Qty: 18 TABLET | Refills: 0 | Status: SHIPPED | OUTPATIENT
Start: 2019-10-23 | End: 2020-09-01

## 2019-10-23 NOTE — TELEPHONE ENCOUNTER
"Last Written Prescription Date:  7/29/19  Last Fill Quantity: 18,  # refills: 0   Last office visit: 8/23/2019 with prescribing provider: Yanique  Future Office Visit:   Next 5 appointments (look out 90 days)    Dec 03, 2019 10:00 AM CST  PHYSICAL with PIHLOMENA Borjas CNP  [facility] ([facility]) [address] [place] [id number] [phone]           Requested Prescriptions   Pending Prescriptions Disp Refills     SUMAtriptan (IMITREX) 25 MG tablet 18 tablet 0     Sig: TAKE 1 TO 2 TABLETS (25-50MG) BY MOUTH AT ONSET OF HEADACHE FOR MIGRAINE. MAY REPEAT IN 2 HOURS. MAX 8 TABLETS IN 24 HOURS       Serotonin Agonists Failed - 10/23/2019  1:49 PM        Failed - Serotonin Agonist request needs review.     Please review patient's record. If patient has had 8 or more treatments in the past month, please forward to provider.          Passed - Blood pressure under 140/90 in past 12 months     BP Readings from Last 3 Encounters:   08/23/19 102/74   07/29/19 112/74   11/28/18 116/62                 Passed - Recent (12 mo) or future (30 days) visit within the authorizing provider's specialty     Patient has had an office visit with the authorizing provider or a provider within the authorizing providers department within the previous 12 mos or has a future within next 30 days. See \"Patient Info\" tab in inbasket, or \"Choose Columns\" in Meds & Orders section of the refill encounter.              Passed - Medication is active on med list        Passed - Patient is age 18 or older        Passed - No active pregnancy on record        Passed - No positive pregnancy test in past 12 months          "

## 2019-10-23 NOTE — TELEPHONE ENCOUNTER
Prescription approved per [facility] Refill Protocol.    Martha Holden RN, BSN  [facility]

## 2019-11-22 NOTE — PROGRESS NOTES
Antoinette is a 49 year old No obstetric history on file. female who presents for annual exam.     Besides routine health maintenance,  she would like to discuss irregular periods since ablation.    HPI:here for annual exam.  She had uterine ablation in 2017, no bleeding for first few years, now is having irregular cycles or spotting for last 6 months at least.  Has cramps at times.  All blood work done with PCP.  Pap last year was negative, but positive for other HPV    The patient's PCP is Cindi Cho MD.        GYNECOLOGIC HISTORY:    No LMP recorded. Patient has had an ablation.    Regular menses? no  Menses every 3 months or every other month  Length of menses: 6 days    Her current contraception method is: ablation.  She  reports that she has never smoked. She has never used smokeless tobacco.    Patient is sexually active.  STD testing offered?  Declined  Last PHQ-9 score on record =   PHQ-9 SCORE 12/3/2019   PHQ-9 Total Score 0     Last GAD7 score on record =   ARACELI-7 SCORE 12/3/2019   Total Score 0     Alcohol Score = 2    HEALTH MAINTENANCE:  Cholesterol:   Recent Labs   Lab Test 19  0946 06/10/15   CHOL 169 135   HDL 60 51   LDL 89 75   TRIG 101 57           Last Mammo: One year ago, Result: Normal, Next Mammo: Today  Pap:   Lab Results   Component Value Date    PAP NIL, HPV+ 2018    PAP NIL 2017    PAP Negative 2015      Colonoscopy:  Never, Result: Not applicable, Next Colonoscopy: Discussed with PCP, she will put in referral  Dexa:  NA    Health maintenance updated:  No, is due for pap/HPV; also reviewed vaccines. UTD.    HISTORY:  OB History    Para Term  AB Living   3 2 2 0 1 2   SAB TAB Ectopic Multiple Live Births   1 0 0 0 2      # Outcome Date GA Lbr Edwin/2nd Weight Sex Delivery Anes PTL Lv   3 SAB            2 Term         AVERY   1 Term         AVERY       Patient Active Problem List   Diagnosis     Hypothyroidism     Migraine with aura      "Cervical high risk HPV (human papillomavirus) test positive     Past Surgical History:   Procedure Laterality Date     AS HYSTEROSCOPY, SURGICAL; W/ ENDOMETRIAL ABLATION, ANY METHOD      Elicia     BLADDER SURGERY  08/2015    [facility] [place]     SLING BLADDER SUSPENSION WITH FASCIA GREGG        Social History     Tobacco Use     Smoking status: Never Smoker     Smokeless tobacco: Never Used   Substance Use Topics     Alcohol use: Yes     Alcohol/week: 0.0 standard drinks      Problem (# of Occurrences) Relation (Name,Age of Onset)    Diabetes Type 2  (1) Father    Hypertension (1) Father    No Known Problems (7) Mother, Sister, Brother, Maternal Grandmother, Maternal Grandfather, Paternal Grandmother, Other            Current Outpatient Medications   Medication Sig     Ascorbic Acid (VITAMIN C) 500 MG CAPS Take 1,000 mg by mouth     cholecalciferol (VITAMIN D) 1000 UNIT tablet Take 1,000 Units by mouth     liothyronine (CYTOMEL) 5 MCG tablet      multivitamin, therapeutic with minerals (MULTI-VITAMIN) TABS      Ondansetron HCl (ZOFRAN PO)      SUMAtriptan (IMITREX) 25 MG tablet TAKE 1 TO 2 TABLETS (25-50MG) BY MOUTH AT ONSET OF HEADACHE FOR MIGRAINE. MAY REPEAT IN 2 HOURS. MAX 8 TABLETS IN 24 HOURS     SYNTHROID 125 MCG tablet      No current facility-administered medications for this visit.      No Known Allergies    Past medical, surgical, social and family histories were reviewed and updated in EPIC.    ROS:   12 point review of systems negative other than symptoms noted below or in the HPI.  Genitourinary: Cramps, Heavy Bleeding with Period and Irregular Menses  irregular menses    EXAM:  /66   Pulse 76   Ht 1.791 m (5' 10.5\")   Wt 96.3 kg (212 lb 3.2 oz)   BMI 30.02 kg/m     BMI: Body mass index is 30.02 kg/m .    PHYSICAL EXAM:  Constitutional:   Appearance: Well nourished, well developed, alert, in no acute distress  Neck:  Lymph Nodes:  No lymphadenopathy present    Thyroid:  Gland size normal, nontender, no " nodules or masses present  on palpation  Chest:  Respiratory Effort:  Breathing unlabored  Cardiovascular:    Heart: Auscultation:  Regular rate, normal rhythm, no murmurs present  Breasts: Inspection of Breasts:  No lymphadenopathy present., Palpation of Breasts and Axillae:  No masses present on palpation, no breast tenderness., Axillary Lymph Nodes:  No lymphadenopathy present. and No nodularity, asymmetry or nipple discharge bilaterally.  Gastrointestinal:   Abdominal Examination:  Abdomen nontender to palpation, tone normal without rigidity or guarding, no masses present, umbilicus without lesions   Liver and Spleen:  No hepatomegaly present, liver nontender to palpation    Hernias:  No hernias present  Lymphatic: Lymph Nodes:  No other lymphadenopathy present  Skin:  General Inspection:  No rashes present, no lesions present, no areas of  discoloration  Neurologic:    Mental Status:  Oriented X3.  Normal strength and tone, sensory exam                grossly normal, mentation intact and speech normal.    Psychiatric:   Mentation appears normal and affect normal/bright.         Pelvic Exam:  External Genitalia:     Normal appearance for age, no discharge present, no tenderness present, no inflammatory lesions present, color normal  Vagina:     Normal vaginal vault without central or paravaginal defects, no discharge present, no inflammatory lesions present, no masses present  Bladder:     Nontender to palpation  Urethra:   Urethral Body:  Urethra palpation normal, urethra structural support normal   Urethral Meatus:  No erythema or lesions present  Cervix:     Appearance healthy, no lesions present, nontender to palpation, brown spotting present  Uterus:     Uterus: firm, normal sized and nontender, anteverted in position.   Adnexa:     No adnexal tenderness present, no adnexal masses present  Perineum:     Perineum within normal limits, no evidence of trauma, no rashes or skin lesions present  Anus:     Anus  within normal limits, no hemorrhoids present  Inguinal Lymph Nodes:     No lymphadenopathy present  Pubic Hair:     Normal pubic hair distribution for age  Genitalia and Groin:     No rashes present, no lesions present, no areas of discoloration, no masses present      COUNSELING:   Reviewed preventive health counseling, as reflected in patient instructions       Regular exercise       Healthy diet/nutrition       (Suni)menopause management    BMI: Body mass index is 30.02 kg/m .  Weight management plan: Discussed healthy diet and exercise guidelines    ASSESSMENT:  49 year old female with satisfactory annual exam.    ICD-10-CM    1. Encounter for gynecological examination without abnormal finding Z01.419    2. Cervical high risk HPV (human papillomavirus) test positive R87.810 Pap imaged thin layer screen with HPV - recommended age 30 - 65     HPV High Risk Types DNA Cervical   3. DUB (dysfunctional uterine bleeding) N93.8 US Transvaginal Non OB       PLAN:  Normal Gyn exam. To return for pelvic US and see me after.    Return 1 year for annual and pap.    PHILOMENA Borjas CNP

## 2019-12-03 ENCOUNTER — OFFICE VISIT (OUTPATIENT)
Dept: OBGYN | Facility: CLINIC | Age: 49
End: 2019-12-03
Payer: COMMERCIAL

## 2019-12-03 ENCOUNTER — ANCILLARY PROCEDURE (OUTPATIENT)
Dept: MAMMOGRAPHY | Facility: CLINIC | Age: 49
End: 2019-12-03
Payer: COMMERCIAL

## 2019-12-03 VITALS
BODY MASS INDEX: 29.71 KG/M2 | WEIGHT: 212.2 LBS | HEART RATE: 76 BPM | DIASTOLIC BLOOD PRESSURE: 66 MMHG | HEIGHT: 71 IN | SYSTOLIC BLOOD PRESSURE: 104 MMHG

## 2019-12-03 DIAGNOSIS — R87.810 CERVICAL HIGH RISK HPV (HUMAN PAPILLOMAVIRUS) TEST POSITIVE: ICD-10-CM

## 2019-12-03 DIAGNOSIS — Z12.31 VISIT FOR SCREENING MAMMOGRAM: ICD-10-CM

## 2019-12-03 DIAGNOSIS — Z01.419 ENCOUNTER FOR GYNECOLOGICAL EXAMINATION WITHOUT ABNORMAL FINDING: Primary | ICD-10-CM

## 2019-12-03 DIAGNOSIS — N93.8 DUB (DYSFUNCTIONAL UTERINE BLEEDING): ICD-10-CM

## 2019-12-03 PROCEDURE — 87624 HPV HI-RISK TYP POOLED RSLT: CPT | Performed by: NURSE PRACTITIONER

## 2019-12-03 PROCEDURE — 77067 SCR MAMMO BI INCL CAD: CPT | Mod: TC

## 2019-12-03 PROCEDURE — 88175 CYTOPATH C/V AUTO FLUID REDO: CPT | Performed by: NURSE PRACTITIONER

## 2019-12-03 PROCEDURE — 99396 PREV VISIT EST AGE 40-64: CPT | Performed by: NURSE PRACTITIONER

## 2019-12-03 RX ORDER — LIOTHYRONINE SODIUM 5 UG/1
5 TABLET ORAL DAILY
COMMUNITY
Start: 2019-09-20

## 2019-12-03 RX ORDER — LEVOTHYROXINE SODIUM 125 UG/1
125 TABLET ORAL DAILY
COMMUNITY
Start: 2019-09-20

## 2019-12-03 ASSESSMENT — ANXIETY QUESTIONNAIRES
6. BECOMING EASILY ANNOYED OR IRRITABLE: NOT AT ALL
1. FEELING NERVOUS, ANXIOUS, OR ON EDGE: NOT AT ALL
3. WORRYING TOO MUCH ABOUT DIFFERENT THINGS: NOT AT ALL
IF YOU CHECKED OFF ANY PROBLEMS ON THIS QUESTIONNAIRE, HOW DIFFICULT HAVE THESE PROBLEMS MADE IT FOR YOU TO DO YOUR WORK, TAKE CARE OF THINGS AT HOME, OR GET ALONG WITH OTHER PEOPLE: NOT DIFFICULT AT ALL
5. BEING SO RESTLESS THAT IT IS HARD TO SIT STILL: NOT AT ALL
GAD7 TOTAL SCORE: 0
7. FEELING AFRAID AS IF SOMETHING AWFUL MIGHT HAPPEN: NOT AT ALL
2. NOT BEING ABLE TO STOP OR CONTROL WORRYING: NOT AT ALL

## 2019-12-03 ASSESSMENT — PATIENT HEALTH QUESTIONNAIRE - PHQ9
SUM OF ALL RESPONSES TO PHQ QUESTIONS 1-9: 0
5. POOR APPETITE OR OVEREATING: NOT AT ALL

## 2019-12-03 ASSESSMENT — MIFFLIN-ST. JEOR: SCORE: 1670.72

## 2019-12-04 ASSESSMENT — ANXIETY QUESTIONNAIRES: GAD7 TOTAL SCORE: 0

## 2019-12-06 LAB
COPATH REPORT: NORMAL
PAP: NORMAL

## 2019-12-09 LAB
FINAL DIAGNOSIS: NORMAL
HPV HR 12 DNA CVX QL NAA+PROBE: NEGATIVE
HPV16 DNA SPEC QL NAA+PROBE: NEGATIVE
HPV18 DNA SPEC QL NAA+PROBE: NEGATIVE
SPECIMEN DESCRIPTION: NORMAL
SPECIMEN SOURCE CVX/VAG CYTO: NORMAL

## 2020-01-20 NOTE — PROGRESS NOTES
SUBJECTIVE:                                                   Antoinette Deleon is a 49 year old female who presents to clinic today for the following health issue(s):  Patient presents with:  Ultrasound: pelvc us        HPI: patient here for follow up pelvic US and bleeding after uterine ablation.        No LMP recorded. Patient has had an ablation..     Patient is sexually active, .  Using tubal ligation for contraception.    reports that she has never smoked. She has never used smokeless tobacco.    STD testing offered?  Declined    Health maintenance updated:  yes    Today's PHQ-2 Score:   PHQ-2 (  [employer]) 2019   Q1: Little interest or pleasure in doing things 0   Q2: Feeling down, depressed or hopeless 1   PHQ-2 Score 1   Q1: Little interest or pleasure in doing things Not at all   Q2: Feeling down, depressed or hopeless Several days   PHQ-2 Score 1     Today's PHQ-9 Score:   PHQ-9 SCORE 12/3/2019   PHQ-9 Total Score 0     Today's ARACELI-7 Score:   ARACELI-7 SCORE 12/3/2019   Total Score 0       Problem list and histories reviewed & adjusted, as indicated.  Additional history: as documented.    Patient Active Problem List   Diagnosis     Hypothyroidism     Migraine with aura     Cervical high risk HPV (human papillomavirus) test positive     Past Surgical History:   Procedure Laterality Date     AS HYSTEROSCOPY, SURGICAL; W/ ENDOMETRIAL ABLATION, ANY METHOD      Elicia     BLADDER SURGERY  2015    [facility]     SLING BLADDER SUSPENSION WITH FASCIA GREGG        Social History     Tobacco Use     Smoking status: Never Smoker     Smokeless tobacco: Never Used   Substance Use Topics     Alcohol use: Yes     Alcohol/week: 0.0 standard drinks      Problem (# of Occurrences) Relation (Name,Age of Onset)    Diabetes Type 2  (1) Father    Hypertension (1) Father    No Known Problems (7) Mother, Sister, Brother, Maternal Grandmother, Maternal Grandfather, Paternal Grandmother, Other            Current  Outpatient Medications   Medication Sig     Ascorbic Acid (VITAMIN C) 500 MG CAPS Take 1,000 mg by mouth     cholecalciferol (VITAMIN D) 1000 UNIT tablet Take 1,000 Units by mouth     liothyronine (CYTOMEL) 5 MCG tablet      multivitamin, therapeutic with minerals (MULTI-VITAMIN) TABS      Ondansetron HCl (ZOFRAN PO)      SUMAtriptan (IMITREX) 25 MG tablet TAKE 1 TO 2 TABLETS (25-50MG) BY MOUTH AT ONSET OF HEADACHE FOR MIGRAINE. MAY REPEAT IN 2 HOURS. MAX 8 TABLETS IN 24 HOURS     SYNTHROID 125 MCG tablet      No current facility-administered medications for this visit.      No Known Allergies    ROS:  12 point review of systems negative other than symptoms noted below or in the HPI.  painful menses, irregular menses      OBJECTIVE:     /70   Pulse 62   Wt 96.6 kg (213 lb)   BMI 30.13 kg/m    Body mass index is 30.13 kg/m .    Exam:  Constitutional:  Appearance: Well nourished, well developed alert, in no acute distress  Patient had uterine ablation 2017, no bleeding until recently and now having irregular cycles or spotting for last 6 months and has a lot of cramping with it.  Discussed US and possible polyps.      In-Clinic Test Results:  Results for orders placed or performed in visit on 01/21/20 (from the past 24 hour(s))   US Transvaginal Non OB    Narrative    US Transvaginal Non OB   Order #: [id number] Accession #: [id number]   Study Notes      Melissa Urbano on 1/21/2020  9:00 AM   Gynecological Ultrasound Report  Pelvic U/S - Transvaginal  [facility]  Referring Provider: Yon Townsend MD  Sonographer:  Melissa Urbano  Indication: Dysfunctional uterine bleeding post ablation  LMP: No LMP recorded. Patient has had an ablation.  Gynecological Ultrasonography:   Uterus: retroverted  Size: 7.85 x 6.02 x 5.64 cm   Endometrium: Thickness total 5.2 mm  Findings: Complex fluid in endometrium with echogenic structures,   suspicious for blood product in the endometrial canal with  multiple polyps  Right Ovary: 3.47 x 1.31 x 1.71 cm   Left Ovary: 3.24 x 2.36 x 1.71 cm  Cul de Sac/Pouch of Indra: trace free fluid      Impression:Endometrium with possible polyps. Thickness 5.2mm. No fibroids   noted. Adnexa normal.  Yon Townsend MD       SONOGRAPHER NOTES TO READING PROVIDER:   suspicious for blood product in   the endometrial canal with multiple polyps                  ASSESSMENT/PLAN:                                                        ICD-10-CM    1. DUB (dysfunctional uterine bleeding) N93.8 Suni-Operative Worksheet       There are no Patient Instructions on file for this visit.    Patient will return for [facility] and D and C with Dr. Townsend.    PHILOMENA [facility] FOR [facility] [place]

## 2020-01-21 ENCOUNTER — OFFICE VISIT (OUTPATIENT)
Dept: OBGYN | Facility: CLINIC | Age: 50
End: 2020-01-21
Attending: NURSE PRACTITIONER
Payer: COMMERCIAL

## 2020-01-21 ENCOUNTER — ANCILLARY PROCEDURE (OUTPATIENT)
Dept: ULTRASOUND IMAGING | Facility: CLINIC | Age: 50
End: 2020-01-21
Attending: NURSE PRACTITIONER
Payer: COMMERCIAL

## 2020-01-21 ENCOUNTER — PREP FOR PROCEDURE (OUTPATIENT)
Dept: OBGYN | Facility: CLINIC | Age: 50
End: 2020-01-21

## 2020-01-21 ENCOUNTER — TELEPHONE (OUTPATIENT)
Dept: OBGYN | Facility: CLINIC | Age: 50
End: 2020-01-21

## 2020-01-21 VITALS
WEIGHT: 213 LBS | BODY MASS INDEX: 30.13 KG/M2 | HEART RATE: 62 BPM | DIASTOLIC BLOOD PRESSURE: 70 MMHG | SYSTOLIC BLOOD PRESSURE: 114 MMHG

## 2020-01-21 DIAGNOSIS — N93.8 DUB (DYSFUNCTIONAL UTERINE BLEEDING): ICD-10-CM

## 2020-01-21 DIAGNOSIS — N93.8 DUB (DYSFUNCTIONAL UTERINE BLEEDING): Primary | ICD-10-CM

## 2020-01-21 DIAGNOSIS — N93.8 DYSFUNCTIONAL UTERINE BLEEDING: Primary | ICD-10-CM

## 2020-01-21 PROCEDURE — 76830 TRANSVAGINAL US NON-OB: CPT | Performed by: OBSTETRICS & GYNECOLOGY

## 2020-01-21 PROCEDURE — 99213 OFFICE O/P EST LOW 20 MIN: CPT | Performed by: NURSE PRACTITIONER

## 2020-01-21 NOTE — TELEPHONE ENCOUNTER
SENT FOR 2nd SIGN    Mimi Rivera  Surgery Scheduler    Procedure name(s) - multi select Hysteroscopy, dilatation and curettage    Is this a multi surgeon case? No    Laterality N/A    Reason for procedure Dysfunctional bleeding    Location of Case: [place] OR    Surgeon Procedure Time (incision to closure) in minutes (per procedure as applicable) 30    Note:  Surgical Case Time Needed (in minutes)   Patient Class (for admit prior to surgery, specify number of days in comments): Same day (surgery center outpatient)    Anesthesia General    H&P To Be Completed By: Already Done Elissa should have done it   Pro[facility]    Vendor Needed? No

## 2020-01-22 PROBLEM — N93.8 DYSFUNCTIONAL UTERINE BLEEDING: Status: ACTIVE | Noted: 2020-01-22

## 2020-01-22 NOTE — TELEPHONE ENCOUNTER
Type of surgery: [facility] D&C  Location of surgery: [facility]  Date and time of surgery: 2/5/2020 9:20a  Surgeon: Cary  Pre-Op Appt Date: 1/24/2020  Post-Op Appt Date: TBD   Packet sent out: HANDED AT [facility] BY  1/24/2020  Pre-cert/Authorization completed:  TBD  Date: 1/22/2020 Wayne watson/Kylah Rivera  Surgery Scheduler    DX N93.8  CPT 43085

## 2020-01-23 NOTE — PROGRESS NOTES
SUBJECTIVE:                                                   Antoinette Deleon is a 49 year old female who presents to clinic today for the following health issue(s):  Patient presents with:  Pre-Op Exam: hysteroscopy D&C scheduled 20    Additional information: patient has questions about her past surgeries      HPI:  Discussed prior ablation and amenorrhea for 9 months. Now with cramping and irregular bleeding. No menopausal symptoms.   Ultrasound done showing 5.3mm endometrium with possible polyps.     No LMP recorded. Patient has had an ablation..   Patient is sexually active, .  Using tubal ligation for contraception.    reports that she has never smoked. She has never used smokeless tobacco.      Today's PHQ-2 Score:   PHQ-2 (  [employer]) 2019   Q1: Little interest or pleasure in doing things 0   Q2: Feeling down, depressed or hopeless 1   PHQ-2 Score 1   Q1: Little interest or pleasure in doing things Not at all   Q2: Feeling down, depressed or hopeless Several days   PHQ-2 Score 1     Today's PHQ-9 Score:   PHQ-9 SCORE 12/3/2019   PHQ-9 Total Score 0     Today's ARACELI-7 Score:   ARACELI-7 SCORE 12/3/2019   Total Score 0       Problem list and histories reviewed & adjusted, as indicated.  Additional history: as documented.    Patient Active Problem List   Diagnosis     Hypothyroidism     Migraine with aura     Cervical high risk HPV (human papillomavirus) test positive     Dysfunctional uterine bleeding     Past Surgical History:   Procedure Laterality Date     AS HYSTEROSCOPY, SURGICAL; W/ ENDOMETRIAL ABLATION, ANY METHOD      Elicia     BLADDER SURGERY  2015    [facility]     SLING BLADDER SUSPENSION WITH FASCIA GREGG        Social History     Tobacco Use     Smoking status: Never Smoker     Smokeless tobacco: Never Used   Substance Use Topics     Alcohol use: Yes     Alcohol/week: 0.0 standard drinks      Problem (# of Occurrences) Relation (Name,Age of Onset)    Diabetes Type 2  (1) Father     Hypertension (1) Father    No Known Problems (7) Mother, Sister, Brother, Maternal Grandmother, Maternal Grandfather, Paternal Grandmother, Other            Current Outpatient Medications   Medication Sig     Ascorbic Acid (VITAMIN C) 500 MG CAPS Take 1,000 mg by mouth     cholecalciferol (VITAMIN D) 1000 UNIT tablet Take 1,000 Units by mouth     liothyronine (CYTOMEL) 5 MCG tablet      multivitamin, therapeutic with minerals (MULTI-VITAMIN) TABS      Ondansetron HCl (ZOFRAN PO)      SUMAtriptan (IMITREX) 25 MG tablet TAKE 1 TO 2 TABLETS (25-50MG) BY MOUTH AT ONSET OF HEADACHE FOR MIGRAINE. MAY REPEAT IN 2 HOURS. MAX 8 TABLETS IN 24 HOURS     SYNTHROID 125 MCG tablet      No current facility-administered medications for this visit.      No Known Allergies    ROS:  12 point review of systems negative other than symptoms noted below or in the HPI.        OBJECTIVE:     /72   There is no height or weight on file to calculate BMI.    Exam:  Constitutional:  Appearance: Well nourished, well developed alert, in no acute distress  Neck:  Lymph Nodes:  No lymphadenopathy present; Thyroid:  Gland size normal, nontender, no nodules or masses present on palpation  Chest:  Respiratory Effort:  Breathing unlabored. Clear to auscultation bilaterally.   Cardiovascular: Heart: Auscultation:  Regular rate, normal rhythm, no murmurs present  Neurologic:  Mental Status:  Oriented X3.  Normal strength and tone, sensory exam grossly normal, mentation intact and speech normal.    Psychiatric:  Mentation appears normal and affect normal/bright.     In-Clinic Test Results:  No results found for this or any previous visit (from the past 24 hour(s)).    ASSESSMENT/PLAN:                                                        ICD-10-CM    1. Dysfunctional uterine bleeding N93.8    2. Preop examination Z01.818        Discussed hysteroscopy and d/c in detail. Discussed increased risk for perforation and inability to enter cavity due to  prior ablation.  Pt is healthy and low risk.   Cleared for surgery      Yon Townsend MD  [facility]

## 2020-01-24 ENCOUNTER — OFFICE VISIT (OUTPATIENT)
Dept: OBGYN | Facility: CLINIC | Age: 50
End: 2020-01-24
Payer: COMMERCIAL

## 2020-01-24 VITALS — DIASTOLIC BLOOD PRESSURE: 72 MMHG | SYSTOLIC BLOOD PRESSURE: 112 MMHG

## 2020-01-24 DIAGNOSIS — Z01.818 PREOP EXAMINATION: ICD-10-CM

## 2020-01-24 DIAGNOSIS — N93.8 DYSFUNCTIONAL UTERINE BLEEDING: Primary | ICD-10-CM

## 2020-01-24 PROCEDURE — 99213 OFFICE O/P EST LOW 20 MIN: CPT | Performed by: OBSTETRICS & GYNECOLOGY

## 2020-02-05 ENCOUNTER — ANESTHESIA EVENT (OUTPATIENT)
Dept: SURGERY | Facility: CLINIC | Age: 50
End: 2020-02-05
Payer: COMMERCIAL

## 2020-02-05 ENCOUNTER — ANESTHESIA (OUTPATIENT)
Dept: SURGERY | Facility: CLINIC | Age: 50
End: 2020-02-05
Payer: COMMERCIAL

## 2020-02-05 ENCOUNTER — SURGERY (OUTPATIENT)
Age: 50
End: 2020-02-05
Payer: COMMERCIAL

## 2020-02-05 ENCOUNTER — HOSPITAL ENCOUNTER (OUTPATIENT)
Facility: CLINIC | Age: 50
Discharge: HOME | End: 2020-02-05
Attending: OBSTETRICS & GYNECOLOGY | Admitting: OBSTETRICS & GYNECOLOGY
Payer: COMMERCIAL

## 2020-02-05 VITALS
BODY MASS INDEX: 30.27 KG/M2 | HEIGHT: 71 IN | HEART RATE: 92 BPM | TEMPERATURE: 96.6 F | RESPIRATION RATE: 14 BRPM | SYSTOLIC BLOOD PRESSURE: 133 MMHG | WEIGHT: 216.2 LBS | OXYGEN SATURATION: 99 % | DIASTOLIC BLOOD PRESSURE: 82 MMHG

## 2020-02-05 DIAGNOSIS — N93.8 DYSFUNCTIONAL UTERINE BLEEDING: ICD-10-CM

## 2020-02-05 LAB — B-HCG SERPL-ACNC: <1 IU/L (ref 0–5)

## 2020-02-05 PROCEDURE — 88305 TISSUE EXAM BY PATHOLOGIST: CPT | Performed by: OBSTETRICS & GYNECOLOGY

## 2020-02-05 PROCEDURE — 71000027 ZZH RECOVERY PHASE 2 EACH 15 MINS: Performed by: OBSTETRICS & GYNECOLOGY

## 2020-02-05 PROCEDURE — 36415 COLL VENOUS BLD VENIPUNCTURE: CPT | Performed by: OBSTETRICS & GYNECOLOGY

## 2020-02-05 PROCEDURE — 25000125 ZZHC RX 250: Performed by: NURSE ANESTHETIST, CERTIFIED REGISTERED

## 2020-02-05 PROCEDURE — 25000128 H RX IP 250 OP 636: Performed by: NURSE ANESTHETIST, CERTIFIED REGISTERED

## 2020-02-05 PROCEDURE — 36000056 ZZH SURGERY LEVEL 3 1ST 30 MIN: Performed by: OBSTETRICS & GYNECOLOGY

## 2020-02-05 PROCEDURE — 37000008 ZZH ANESTHESIA TECHNICAL FEE, 1ST 30 MIN: Performed by: OBSTETRICS & GYNECOLOGY

## 2020-02-05 PROCEDURE — 71000012 ZZH RECOVERY PHASE 1 LEVEL 1 FIRST HR: Performed by: OBSTETRICS & GYNECOLOGY

## 2020-02-05 PROCEDURE — 25800030 ZZH RX IP 258 OP 636: Performed by: NURSE ANESTHETIST, CERTIFIED REGISTERED

## 2020-02-05 PROCEDURE — 84702 CHORIONIC GONADOTROPIN TEST: CPT | Performed by: OBSTETRICS & GYNECOLOGY

## 2020-02-05 PROCEDURE — 25000132 ZZH RX MED GY IP 250 OP 250 PS 637: Performed by: OBSTETRICS & GYNECOLOGY

## 2020-02-05 PROCEDURE — 88305 TISSUE EXAM BY PATHOLOGIST: CPT | Mod: 26 | Performed by: OBSTETRICS & GYNECOLOGY

## 2020-02-05 PROCEDURE — 36000058 ZZH SURGERY LEVEL 3 EA 15 ADDTL MIN: Performed by: OBSTETRICS & GYNECOLOGY

## 2020-02-05 PROCEDURE — 27210794 ZZH OR GENERAL SUPPLY STERILE: Performed by: OBSTETRICS & GYNECOLOGY

## 2020-02-05 PROCEDURE — 25000566 ZZH SEVOFLURANE, EA 15 MIN: Performed by: OBSTETRICS & GYNECOLOGY

## 2020-02-05 PROCEDURE — 40000170 ZZH STATISTIC PRE-PROCEDURE ASSESSMENT II: Performed by: OBSTETRICS & GYNECOLOGY

## 2020-02-05 PROCEDURE — 37000009 ZZH ANESTHESIA TECHNICAL FEE, EACH ADDTL 15 MIN: Performed by: OBSTETRICS & GYNECOLOGY

## 2020-02-05 PROCEDURE — 58558 HYSTEROSCOPY BIOPSY: CPT | Performed by: OBSTETRICS & GYNECOLOGY

## 2020-02-05 RX ORDER — ONDANSETRON 2 MG/ML
INJECTION INTRAMUSCULAR; INTRAVENOUS PRN
Status: DISCONTINUED | OUTPATIENT
Start: 2020-02-05 | End: 2020-02-05

## 2020-02-05 RX ORDER — ONDANSETRON 2 MG/ML
4 INJECTION INTRAMUSCULAR; INTRAVENOUS EVERY 30 MIN PRN
Status: DISCONTINUED | OUTPATIENT
Start: 2020-02-05 | End: 2020-02-05 | Stop reason: HOSPADM

## 2020-02-05 RX ORDER — SODIUM CHLORIDE, SODIUM LACTATE, POTASSIUM CHLORIDE, CALCIUM CHLORIDE 600; 310; 30; 20 MG/100ML; MG/100ML; MG/100ML; MG/100ML
INJECTION, SOLUTION INTRAVENOUS CONTINUOUS PRN
Status: DISCONTINUED | OUTPATIENT
Start: 2020-02-05 | End: 2020-02-05

## 2020-02-05 RX ORDER — KETOROLAC TROMETHAMINE 30 MG/ML
INJECTION, SOLUTION INTRAMUSCULAR; INTRAVENOUS PRN
Status: DISCONTINUED | OUTPATIENT
Start: 2020-02-05 | End: 2020-02-05

## 2020-02-05 RX ORDER — LIDOCAINE HYDROCHLORIDE 20 MG/ML
INJECTION, SOLUTION INFILTRATION; PERINEURAL PRN
Status: DISCONTINUED | OUTPATIENT
Start: 2020-02-05 | End: 2020-02-05

## 2020-02-05 RX ORDER — ONDANSETRON 4 MG/1
4 TABLET, ORALLY DISINTEGRATING ORAL EVERY 30 MIN PRN
Status: DISCONTINUED | OUTPATIENT
Start: 2020-02-05 | End: 2020-02-05 | Stop reason: HOSPADM

## 2020-02-05 RX ORDER — FENTANYL CITRATE 0.05 MG/ML
25-50 INJECTION, SOLUTION INTRAMUSCULAR; INTRAVENOUS
Status: DISCONTINUED | OUTPATIENT
Start: 2020-02-05 | End: 2020-02-05 | Stop reason: HOSPADM

## 2020-02-05 RX ORDER — FENTANYL CITRATE 50 UG/ML
INJECTION, SOLUTION INTRAMUSCULAR; INTRAVENOUS PRN
Status: DISCONTINUED | OUTPATIENT
Start: 2020-02-05 | End: 2020-02-05

## 2020-02-05 RX ORDER — HYDROMORPHONE HYDROCHLORIDE 1 MG/ML
.3-.5 INJECTION, SOLUTION INTRAMUSCULAR; INTRAVENOUS; SUBCUTANEOUS EVERY 10 MIN PRN
Status: DISCONTINUED | OUTPATIENT
Start: 2020-02-05 | End: 2020-02-05 | Stop reason: HOSPADM

## 2020-02-05 RX ORDER — MEPERIDINE HYDROCHLORIDE 25 MG/ML
12.5 INJECTION INTRAMUSCULAR; INTRAVENOUS; SUBCUTANEOUS
Status: DISCONTINUED | OUTPATIENT
Start: 2020-02-05 | End: 2020-02-05 | Stop reason: HOSPADM

## 2020-02-05 RX ORDER — FENTANYL CITRATE 50 UG/ML
25-50 INJECTION, SOLUTION INTRAMUSCULAR; INTRAVENOUS
Status: DISCONTINUED | OUTPATIENT
Start: 2020-02-05 | End: 2020-02-05 | Stop reason: HOSPADM

## 2020-02-05 RX ORDER — PROPOFOL 10 MG/ML
INJECTION, EMULSION INTRAVENOUS CONTINUOUS PRN
Status: DISCONTINUED | OUTPATIENT
Start: 2020-02-05 | End: 2020-02-05

## 2020-02-05 RX ORDER — SODIUM CHLORIDE, SODIUM LACTATE, POTASSIUM CHLORIDE, CALCIUM CHLORIDE 600; 310; 30; 20 MG/100ML; MG/100ML; MG/100ML; MG/100ML
INJECTION, SOLUTION INTRAVENOUS CONTINUOUS
Status: DISCONTINUED | OUTPATIENT
Start: 2020-02-05 | End: 2020-02-05 | Stop reason: HOSPADM

## 2020-02-05 RX ORDER — NALOXONE HYDROCHLORIDE 0.4 MG/ML
.1-.4 INJECTION, SOLUTION INTRAMUSCULAR; INTRAVENOUS; SUBCUTANEOUS
Status: DISCONTINUED | OUTPATIENT
Start: 2020-02-05 | End: 2020-02-05 | Stop reason: HOSPADM

## 2020-02-05 RX ORDER — PROPOFOL 10 MG/ML
INJECTION, EMULSION INTRAVENOUS PRN
Status: DISCONTINUED | OUTPATIENT
Start: 2020-02-05 | End: 2020-02-05

## 2020-02-05 RX ORDER — DEXAMETHASONE SODIUM PHOSPHATE 4 MG/ML
INJECTION, SOLUTION INTRA-ARTICULAR; INTRALESIONAL; INTRAMUSCULAR; INTRAVENOUS; SOFT TISSUE PRN
Status: DISCONTINUED | OUTPATIENT
Start: 2020-02-05 | End: 2020-02-05

## 2020-02-05 RX ORDER — ACETAMINOPHEN 325 MG/1
975 TABLET ORAL ONCE
Status: COMPLETED | OUTPATIENT
Start: 2020-02-05 | End: 2020-02-05

## 2020-02-05 RX ADMIN — MIDAZOLAM 2 MG: 1 INJECTION INTRAMUSCULAR; INTRAVENOUS at 09:11

## 2020-02-05 RX ADMIN — ACETAMINOPHEN 975 MG: 325 TABLET, FILM COATED ORAL at 08:42

## 2020-02-05 RX ADMIN — PHENYLEPHRINE HYDROCHLORIDE 50 MCG: 10 INJECTION INTRAVENOUS at 09:45

## 2020-02-05 RX ADMIN — DEXAMETHASONE SODIUM PHOSPHATE 4 MG: 4 INJECTION, SOLUTION INTRA-ARTICULAR; INTRALESIONAL; INTRAMUSCULAR; INTRAVENOUS; SOFT TISSUE at 09:19

## 2020-02-05 RX ADMIN — FENTANYL CITRATE 50 MCG: 50 INJECTION, SOLUTION INTRAMUSCULAR; INTRAVENOUS at 09:20

## 2020-02-05 RX ADMIN — LIDOCAINE HYDROCHLORIDE 100 MG: 20 INJECTION, SOLUTION INFILTRATION; PERINEURAL at 09:11

## 2020-02-05 RX ADMIN — SODIUM CHLORIDE, POTASSIUM CHLORIDE, SODIUM LACTATE AND CALCIUM CHLORIDE: 600; 310; 30; 20 INJECTION, SOLUTION INTRAVENOUS at 09:08

## 2020-02-05 RX ADMIN — FENTANYL CITRATE 50 MCG: 50 INJECTION, SOLUTION INTRAMUSCULAR; INTRAVENOUS at 09:11

## 2020-02-05 RX ADMIN — PHENYLEPHRINE HYDROCHLORIDE 100 MCG: 10 INJECTION INTRAVENOUS at 09:25

## 2020-02-05 RX ADMIN — DEXMEDETOMIDINE HYDROCHLORIDE 8 MCG: 100 INJECTION, SOLUTION INTRAVENOUS at 09:40

## 2020-02-05 RX ADMIN — PROPOFOL 200 MG: 10 INJECTION, EMULSION INTRAVENOUS at 09:11

## 2020-02-05 RX ADMIN — PHENYLEPHRINE HYDROCHLORIDE 100 MCG: 10 INJECTION INTRAVENOUS at 09:36

## 2020-02-05 RX ADMIN — PROPOFOL 30 MCG/KG/MIN: 10 INJECTION, EMULSION INTRAVENOUS at 09:12

## 2020-02-05 RX ADMIN — KETOROLAC TROMETHAMINE 30 MG: 30 INJECTION, SOLUTION INTRAMUSCULAR at 09:47

## 2020-02-05 RX ADMIN — ONDANSETRON 4 MG: 2 INJECTION INTRAMUSCULAR; INTRAVENOUS at 09:46

## 2020-02-05 ASSESSMENT — ENCOUNTER SYMPTOMS
SEIZURES: 0
ORTHOPNEA: 0

## 2020-02-05 ASSESSMENT — MIFFLIN-ST. JEOR: SCORE: 1696.81

## 2020-02-05 NOTE — ANESTHESIA PREPROCEDURE EVALUATION
Anesthesia Pre-Procedure Evaluation    Patient: Antoinette Deleon   MRN: [id number] : 1970          Preoperative Diagnosis: Dysfunctional uterine bleeding [N93.8]    Procedure(s):  HYSTEROSCOPY,  DILATION AND CURETTAGE    Past Medical History:   Diagnosis Date     Cervical high risk HPV (human papillomavirus) test positive 2018 NIL pap, + HR HPV (not 16/18). Plan: cotest in 1 year     Menorrhagia      Thyroid disease      Past Surgical History:   Procedure Laterality Date     AS HYSTEROSCOPY, SURGICAL; W/ ENDOMETRIAL ABLATION, ANY METHOD      Elicia     BLADDER SURGERY  2015    [facility]     GYN SURGERY      ablation, tubal      SLING BLADDER SUSPENSION WITH FASCIA GREGG         Anesthesia Evaluation     . Pt has had prior anesthetic.     No history of anesthetic complications          ROS/MED HX    ENT/Pulmonary:      (-) sleep apnea and recent URI   Neurologic:     (+)migraines,    (-) seizures and CVA   Cardiovascular:        (-) hypertension, CHF and orthopnea/PND   METS/Exercise Tolerance:     Hematologic:  - neg hematologic  ROS       Musculoskeletal:  - neg musculoskeletal ROS       GI/Hepatic:  - neg GI/hepatic ROS      (-) GERD   Renal/Genitourinary:  - ROS Renal section negative       Endo:     (+) thyroid problem hypothyroidism, .   (-) Type II DM   Psychiatric:  - neg psychiatric ROS       Infectious Disease:  - neg infectious disease ROS       Malignancy:      - no malignancy   Other:                          Physical Exam  Normal systems: cardiovascular, pulmonary and dental    Airway   Mallampati: II  TM distance: >3 FB  Neck ROM: full    Dental     Cardiovascular   Rhythm and rate: regular and normal      Pulmonary    breath sounds clear to auscultation            Lab Results   Component Value Date    WBC 8.5 2019    HGB 14.8 2019    HCT 43.9 2019     2019     2019    POTASSIUM 4.4 2019    CHLORIDE 106 2019    CO2 26  "07/29/2019    BUN 12 07/29/2019    CR 0.74 07/29/2019     (H) 07/29/2019    VENUS 8.9 07/29/2019       Preop Vitals  BP Readings from Last 3 Encounters:   02/05/20 115/80   01/24/20 112/72   01/21/20 114/70    Pulse Readings from Last 3 Encounters:   01/21/20 62   12/03/19 76   07/29/19 80      Resp Readings from Last 3 Encounters:   02/05/20 16   11/30/17 20   11/06/17 16    SpO2 Readings from Last 3 Encounters:   02/05/20 97%   07/29/19 97%   11/30/17 97%      Temp Readings from Last 1 Encounters:   02/05/20 36.6  C (97.9  F) (Oral)    Ht Readings from Last 1 Encounters:   02/05/20 1.803 m (5' 11\")      Wt Readings from Last 1 Encounters:   02/05/20 98.1 kg (216 lb 3.2 oz)    Estimated body mass index is 30.15 kg/m  as calculated from the following:    Height as of this encounter: 1.803 m (5' 11\").    Weight as of this encounter: 98.1 kg (216 lb 3.2 oz).       Anesthesia Plan      History & Physical Review  History and physical reviewed and following examination; no interval change.    ASA Status:  2 .    NPO Status:  > 8 hours    Plan for General and LMA with Propofol induction. Maintenance will be TIVA.    PONV prophylaxis:  Ondansetron (or other 5HT-3) and Dexamethasone or Solumedrol       Postoperative Care  Postoperative pain management:  IV analgesics.      Consents  Anesthetic plan, risks, benefits and alternatives discussed with:  Patient and Spouse..                 Kennedy Ely MD  "

## 2020-02-05 NOTE — DISCHARGE INSTRUCTIONS
Today you received Toradol, an antiinflammatory medication similar to Ibuprofen.  You should not take other antiinflammatory medication, such as Ibuprofen, Motrin, Advil, Aleve, Naprosyn, etc until 3:45 pm.     YOU RECEIVED 975 MG ACETAMINOPHEN AT 8:45 AM, next dose can be taken after 2:45 pm. MAXIMUM DAILY DOSE IS 4000 MG.      Same Day Surgery Discharge Instructions for  Sedation and General Anesthesia       It's not unusual to feel dizzy, light-headed or faint for up to 24 hours after surgery or while taking pain medication.  If you have these symptoms: sit for a few minutes before standing and have someone assist you when you get up to walk or use the bathroom.      You should rest and relax for the next 24 hours. We recommend you make arrangements to have an adult stay with you for at least 24 hours after your discharge.  Avoid hazardous and strenuous activity.      DO NOT DRIVE any vehicle or operate mechanical equipment for 24 hours following the end of your surgery.  Even though you may feel normal, your reactions may be affected by the medication you have received.      Do not drink alcoholic beverages for 24 hours following surgery.       Slowly progress to your regular diet as you feel able. It's not unusual to feel nauseated and/or vomit after receiving anesthesia.  If you develop these symptoms, drink clear liquids (apple juice, ginger ale, broth, 7-up, etc. ) until you feel better.  If your nausea and vomiting persists for 24 hours, please notify your surgeon.        All narcotic pain medications, along with inactivity and anesthesia, can cause constipation. Drinking plenty of liquids and increasing fiber intake will help.      For any questions of a medical nature, call your surgeon.      Do not make important decisions for 24 hours.      If you had general anesthesia, you may have a sore throat for a couple of days related to the breathing tube used during surgery.  You may use Cepacol lozenges to  help with this discomfort.  If it worsens or if you develop a fever, contact your surgeon.       If you feel your pain is not well managed with the pain medications prescribed by your surgeon, please contact your surgeon's office to let them know so they can address your concerns.       POST-OPERATIVE INSTRUCTIONS FOLLOWING  D & C / ENDOMETRIAL ABLATION    ACTIVITY:  You may resume normal activities including lifting as needed.  It is permissible to drive a car and to climb stairs.  Baths or showers are perfectly acceptable.     CHECK-UP:  You should be seen 1 month after discharge unless home instruction sheet states otherwise and please phone the office the day after procedure and schedule an appointment with your physician.    VAGINAL DISCHARGE:  You may have some vaginal bleeding or discharge for about a week after discharge.  You should avoid douches, tampons, and intercourse for the first week.    TEMPERATURE:  If you develop temperature levels to over 100.4 F your physician should be called immediately.    DIET:  [place] or light diet is advisable the day of surgery.  If nausea persists, continue this diet.  If severe, call.    [facility]  [phone]

## 2020-02-05 NOTE — OP NOTE
Procedure Date: 02/05/2020      PROCEDURE:  Hysteroscopy, D and C.      PREOPERATIVE DIAGNOSIS:  Dysfunctional uterine bleeding.      POSTOPERATIVE DIAGNOSES:   1.  Dysfunctional uterine bleeding.   2.  Bicornuate uterus.      SURGEON:  Yon Townsend MD.        ANESTHESIA:  General.      ESTIMATED BLOOD LOSS:  5 mL      COMPLICATIONS:  None.      FINDINGS:  Bilobed uterine cavity with normal tubal ostia bilaterally.  The patient's right cavity was ablated, left cavity normal.      INDICATIONS FOR PROCEDURE:  The patient is a 49-year-old with persistent and heavy dysfunctional uterine bleeding.  She has undergone Elicia ablation in 2017.  The patient had amenorrhea for a year and now has developed heavy dysfunctional bleeding.  Ultrasound showed possible polyps within the endometrial cavity, but endometrial thickness measured just over 5 mm.      The patient was counseled as to the risks and benefits of hysteroscopy including a significant risk of perforation with previous ablation.      DESCRIPTION OF PROCEDURE:  The patient was brought to the operating room, legs were placed in Darion stirrups.  She was then prepped and draped in the usual fashion.  The external os was dilated slightly with Hegar dilators.  Hysteroscope placed in order to determine the direction of the cervical canal.  Previous to this, a uterine probe was not able to be inserted.  Dilating slightly further and reinserting the hysteroscope continued to elucidate the pathway.  Eventually, the cervical canal was fully dilated allowing the scope to enter into the uterine cavity.  The right tubal ostia was easily visualized.  The cavity appeared to be ablated and atrophic.  The left tubal ostia was not visualized.  The scope was pulled back slightly and a left cavity was then noted.  Left ostia was normal.  Cavity was smooth with normal pink endometrial tissue.  D and C was then performed by palpation in both uterine horns.  Scant tissue was  obtained.  The procedure at this point was terminated.  The patient was recalled from general anesthesia and transferred to the recovery room in stable condition.         ARIANNA IZQUIERDO MD             D: 2020   T: 2020   MT: ABRAM      Name:     EMILY TEJEDA   MRN:      [id number]        Account:        [id number]   :      1970           Procedure Date: 2020      Document: [id number]

## 2020-02-05 NOTE — ANESTHESIA POSTPROCEDURE EVALUATION
Patient: Antoinette Deleon    Procedure(s):  HYSTEROSCOPY,  DILATION AND CURETTAGE    Diagnosis:Dysfunctional uterine bleeding [N93.8]  Diagnosis Additional Information: No value filed.    Anesthesia Type:  General, LMA    Note:  Anesthesia Post Evaluation    Patient location during evaluation: PACU  Patient participation: Able to fully participate in evaluation  Level of consciousness: awake  Pain management: adequate  Airway patency: patent  Cardiovascular status: acceptable  Respiratory status: acceptable  Hydration status: acceptable  PONV: none     Anesthetic complications: None          Last vitals:  Vitals:    02/05/20 1045 02/05/20 1100 02/05/20 1126   BP: 119/52  133/82   Pulse: 92     Resp: 12 11 14   Temp:      SpO2: 99% 99% 99%         Electronically Signed By: Kennedy Ely MD  February 5, 2020  12:23 PM

## 2020-02-05 NOTE — ANESTHESIA CARE TRANSFER NOTE
Patient: Antoinette Deleon    Procedure(s):  HYSTEROSCOPY,  DILATION AND CURETTAGE    Diagnosis: Dysfunctional uterine bleeding [N93.8]  Diagnosis Additional Information: No value filed.    Anesthesia Type:   General, LMA     Note:  Airway :Face Mask  Patient transferred to:PACU  Comments: Pt exhibits spont resps, all monitors and alarms on in pacu, report given to RN, vss.Handoff Report: Identifed the Patient, Identified the Reponsible Provider, Reviewed the pertinent medical history, Discussed the surgical course, Reviewed Intra-OP anesthesia mangement and issues during anesthesia, Set expectations for post-procedure period and Allowed opportunity for questions and acknowledgement of understanding      Vitals: (Last set prior to Anesthesia Care Transfer)    CRNA VITALS  2/5/2020 0937 - 2/5/2020 1007      2/5/2020             Resp Rate (set):  10                Electronically Signed By: PHILOMENA Calvo CRNA  February 5, 2020  10:07 AM

## 2020-02-05 NOTE — PROGRESS NOTES
[facility]  Gynecology Brief Operative Note    Pre-operative diagnosis: Dysfunctional uterine bleeding [N93.8]   Post-operative diagnosis same   Procedure: Procedure(s):  HYSTEROSCOPY,  DILATION AND CURETTAGE   Surgeon: Yon Townsend MD   Assistants(s): none   Anesthesia: General    Estimated blood loss: 5cc            Total urine output:    Drains: None   Specimens: Uterine currettings   Implants: None   Findings: Bicornuate uterus. Ablated right side. Normal left side   Complications: None   Condition: Stable   Comments: See dictated operative report for full details

## 2020-02-06 LAB — COPATH REPORT: NORMAL

## 2020-02-14 ENCOUNTER — MYC MEDICAL ADVICE (OUTPATIENT)
Dept: OBGYN | Facility: CLINIC | Age: 50
End: 2020-02-14

## 2020-02-14 DIAGNOSIS — N93.8 DUB (DYSFUNCTIONAL UTERINE BLEEDING): Primary | ICD-10-CM

## 2020-02-14 NOTE — TELEPHONE ENCOUNTER
OCPs while waiting would be great but need to start D#1 of cycle. If no idea where she is in a cycle can start now but may have BTB in first pack. Hopefully it will at least slow bleeding down so no accidents. If want to be aggressive can do multiple OCPs a day until bleeding stops and then wean down to one a day

## 2020-02-14 NOTE — TELEPHONE ENCOUNTER
Informed of Dr. Townsend's comments.  Pt verbalized understanding, in agreement with plan, and voiced no further questions. rx sent  Angie Weaver RN on 2/14/2020 at 10:58 AM

## 2020-03-19 ENCOUNTER — TELEPHONE (OUTPATIENT)
Dept: OBGYN | Facility: CLINIC | Age: 50
End: 2020-03-19

## 2020-03-19 DIAGNOSIS — N93.8 DUB (DYSFUNCTIONAL UTERINE BLEEDING): ICD-10-CM

## 2020-03-19 NOTE — TELEPHONE ENCOUNTER
Refill approved and sent to preferred pharmacy.  Pt informed.  Pt verbalized understanding, in agreement with plan, and voiced no further questions.  Monalisa Gill RN on 3/19/2020 at 12:24 PM

## 2020-03-19 NOTE — TELEPHONE ENCOUNTER
Patient cancelled her consult with  and is wanting  to extend her birth control 6 months. Please call patient today,

## 2020-08-07 ENCOUNTER — COMMUNICATION - HEALTHEAST (OUTPATIENT)
Dept: HEALTH INFORMATION MANAGEMENT | Facility: CLINIC | Age: 50
End: 2020-08-07

## 2020-08-31 DIAGNOSIS — N93.8 DUB (DYSFUNCTIONAL UTERINE BLEEDING): ICD-10-CM

## 2020-08-31 RX ORDER — NORGESTREL AND ETHINYL ESTRADIOL 0.3-0.03MG
KIT ORAL
Qty: 112 TABLET | Refills: 0 | Status: SHIPPED | OUTPATIENT
Start: 2020-08-31 | End: 2020-11-30

## 2020-08-31 NOTE — TELEPHONE ENCOUNTER
"Requested Prescriptions   Pending Prescriptions Disp Refills     LOW-OGESTREL 0.3-30 MG-MCG tablet [Pharmacy Med Name: LOW-OGESTREL TABLETS 28] 112 tablet 1     Sig: TAKE 1 TABLET BY MOUTH EVERY DAY CONTINUOUSLY AND SKIP PLACEBO TABLETS       Contraceptives Protocol Passed - 8/31/2020  4:09 PM        Passed - Patient is not a current smoker if age is 35 or older        Passed - Recent (12 mo) or future (30 days) visit within the authorizing provider's specialty     Patient has had an office visit with the authorizing provider or a provider within the authorizing providers department within the previous 12 mos or has a future within next 30 days. See \"Patient Info\" tab in inbasket, or \"Choose Columns\" in Meds & Orders section of the refill encounter.              Passed - Medication is active on med list        Passed - No active pregnancy on record        Passed - No positive pregnancy test in past 12 months           Last Written Prescription Date:  3/19/20  Last Fill Quantity: 112,  # refills: 1   Last office visit: 1/24/2020 with prescribing provider:  Dr Townsend   Future Office Visit:    Prescription approved per [facility] Refill Protocol.  Monalisa Gill RN on 8/31/2020 at 4:17 PM          "

## 2020-09-01 ENCOUNTER — MYC REFILL (OUTPATIENT)
Dept: FAMILY MEDICINE | Facility: CLINIC | Age: 50
End: 2020-09-01

## 2020-09-01 DIAGNOSIS — G43.109 MIGRAINE WITH AURA AND WITHOUT STATUS MIGRAINOSUS, NOT INTRACTABLE: ICD-10-CM

## 2020-09-01 RX ORDER — SUMATRIPTAN 25 MG/1
TABLET, FILM COATED ORAL
Qty: 18 TABLET | Refills: 0 | Status: SHIPPED | OUTPATIENT
Start: 2020-09-01 | End: 2021-05-18

## 2020-09-01 NOTE — TELEPHONE ENCOUNTER
Routing refill request to provider for review/approval because:  A break in medication.  Last prescribed 10/19 for # 18 with 0 refills.  Pt needs to be seen since has been over a year since last office visit.       Fani SULLIVAN RN  EP Triage

## 2020-10-14 ENCOUNTER — VIRTUAL VISIT (OUTPATIENT)
Dept: FAMILY MEDICINE | Facility: CLINIC | Age: 50
End: 2020-10-14
Payer: COMMERCIAL

## 2020-10-14 DIAGNOSIS — G43.109 MIGRAINE WITH AURA AND WITHOUT STATUS MIGRAINOSUS, NOT INTRACTABLE: Primary | ICD-10-CM

## 2020-10-14 DIAGNOSIS — Q51.3 BICORNATE UTERUS: ICD-10-CM

## 2020-10-14 DIAGNOSIS — N92.1 MENORRHAGIA WITH IRREGULAR CYCLE: ICD-10-CM

## 2020-10-14 PROCEDURE — 99214 OFFICE O/P EST MOD 30 MIN: CPT | Mod: TEL | Performed by: INTERNAL MEDICINE

## 2020-10-14 NOTE — PATIENT INSTRUCTIONS
Rolando Curry,     I've referred you to [facility]. This is located at [address] [place] [address].   Phone # is [phone]       A few of the doctors there that I high recommend include: Dr. Millicent Sutton, Dr. Chuck Huynh, Dr. Sherry Zavala (Dr. Zavala is no longer performing surgery to my knowledge and is an excellent resource)    Thanks!    Millicent Cho

## 2020-10-14 NOTE — PROGRESS NOTES
"Antoinette Deleon is a 50 year old female who is being evaluated via a billable telephone visit.      The patient has been notified of following:     \"This telephone visit will be conducted via a call between you and your physician/provider. We have found that certain health care needs can be provided without the need for a physical exam.  This service lets us provide the care you need with a short phone conversation.  If a prescription is necessary we can send it directly to your pharmacy.  If lab work is needed we can place an order for that and you can then stop by our lab to have the test done at a later time.    Telephone visits are billed at different rates depending on your insurance coverage. During this emergency period, for some insurers they may be billed the same as an in-person visit.  Please reach out to your insurance provider with any questions.    If during the course of the call the physician/provider feels a telephone visit is not appropriate, you will not be charged for this service.\"    Patient has given verbal consent for Telephone visit?  Yes    What phone number would you like to be contacted at? [phone]    How would you like to obtain your AVS? Ankit Rice     Antoinette Deleon is a 50 year old female who presents via phone visit today for the following health issues:    HPI     Migraine     Since your last clinic visit, how have your headaches changed?  No change. Takes Imitrex and Zofran right away when her migraine comes up and this works very effectively.    How often are you getting headaches or migraines? Once a quarter- twice a year     Are you able to do normal daily activities when you have a migraine? No    Are you taking rescue/relief medications? (Select all that apply) sumatriptan (Imitrex)    How helpful is your rescue/relief medication?  I get total relief    Are you taking any medications to prevent migraines? (Select all that apply)  Zofran     In the " past 4 weeks, how often have you gone to urgent care or the emergency room because of your headaches?  0      How many servings of fruits and vegetables do you eat daily?  4 or more    On average, how many sweetened beverages do you drink each day (Examples: soda, juice, sweet tea, etc.  Do NOT count diet or artificially sweetened beverages)?   0    How many days per week do you exercise enough to make your heart beat faster? 7    How many minutes a day do you exercise enough to make your heart beat faster? 30 - 60    How many days per week do you miss taking your medication? 0      Antoinette has 4 teenage children and 3 out of the 4 are 100% virtual and her middle school daughter is in 2 days per week. This past week has been tough; her kids are having a hard time.   Antoinette met with a friend a week or two ago who challenged her to change her diet and exercise more. She is supporting her through this and it has been really great.    Has been having a lot of excessive bleeding and found to have a bicornate uterus. She had an ablation done but this has not taken away all of her bleeding. Takes an OCP regularly but still has breakthrough bleeding. She really does not want to go through a hysterectomy.     Dr. Swain is her endocrinologist.         Review of Systems   Constitutional, HEENT, cardiovascular, pulmonary, GI, , musculoskeletal, neuro, skin, endocrine and psych systems are negative, except as otherwise noted.       Objective          Vitals:  No vitals were obtained today due to virtual visit.    healthy, alert and no distress  PSYCH: Alert and oriented times 3; coherent speech, normal   rate and volume, able to articulate logical thoughts, able   to abstract reason, no tangential thoughts, no hallucinations   or delusions  Her affect is normal, pleasant and full  RESP: No cough, no audible wheezing, able to talk in full sentences  Remainder of exam unable to be completed due to telephone visits     "    Assessment/Plan:    Assessment & Plan     Migraine with aura and without status migrainosus, not intractable  Gets migraines about 2-4 times per year and is able to take care of her symptoms immediately with Imitrex and Zofran.     Menorrhagia with irregular cycle  Secondary to bicornate uterus; opposed to surgery. Seeking a 2nd opinion so I have referred her to [facility] today.  - OB/GYN REFERRAL    Bicornate uterus  - OB/GYN REFERRAL     BMI:   Estimated body mass index is 30.15 kg/m  as calculated from the following:    Height as of 2/5/20: 1.803 m (5' 11\").    Weight as of 2/5/20: 98.1 kg (216 lb 3.2 oz).   Weight management plan: Discussed healthy diet and exercise guidelines         Return in about 2 months (around 12/14/2020) for For pap smear with OB/Gyn.    Cindi Cho MD  [facility]    Phone call duration:  13 minutes              "

## 2020-11-05 ENCOUNTER — VIRTUAL VISIT (OUTPATIENT)
Dept: OBGYN | Facility: CLINIC | Age: 50
End: 2020-11-05
Payer: COMMERCIAL

## 2020-11-05 DIAGNOSIS — N93.8 DUB (DYSFUNCTIONAL UTERINE BLEEDING): Primary | ICD-10-CM

## 2020-11-05 PROCEDURE — 99213 OFFICE O/P EST LOW 20 MIN: CPT | Mod: TEL | Performed by: OBSTETRICS & GYNECOLOGY

## 2020-11-05 NOTE — PROGRESS NOTES
"Antoinette Deleon is a 50 year old female who is being evaluated via a billable telephone visit.      The patient has been notified of following:     \"This telephone visit will be conducted via a call between you and your physician/provider. We have found that certain health care needs can be provided without the need for a physical exam.  This service lets us provide the care you need with a short phone conversation.  If a prescription is necessary we can send it directly to your pharmacy.  If lab work is needed we can place an order for that and you can then stop by our lab to have the test done at a later time.    Telephone visits are billed at different rates depending on your insurance coverage. During this emergency period, for some insurers they may be billed the same as an in-person visit.  Please reach out to your insurance provider with any questions.    If during the course of the call the physician/provider feels a telephone visit is not appropriate, you will not be charged for this service.\"    Patient has given verbal consent for Telephone visit?  Yes    What phone number would you like to be contacted at? [phone]     How would you like to obtain your AVS? MyChart    Phone call duration: 6minutes    Larry Vásquez MD      SUBJECTIVE:                                                   Antoinette Deleon is a 50 year old female who presents to clinic today for the following health issue(s):  Patient presents with:  Consult: would like to discuss options for bleeding.        HPI: The patient is a 50-year-old who calls for a phone conference at this time about continued bleeding.  She had an endometrial ablation 3 years ago but has never really had much success.  She had a ultrasound and a hysteroscopy in February that showed that she had a septate uterus and probably only had one side that was ablated.  There was no other treatment done at that time.  She now is bleeding more than half the " days of the month.  We have counseled her to return for a repeat vaginal probe ultrasound and further discussion as to whether or not any other intrauterine pathology could be complicating this.  The possibility of a hysteroscopy and more thorough ablation was discussed.      No LMP recorded. Patient has had an ablation..     Patient is sexually active, .  Using oral contraceptives for contraception.    reports that she has never smoked. She has never used smokeless tobacco.    Health maintenance updated:  yes    Today's PHQ-2 Score:   PHQ-2 (  [employer]) 2019   Q1: Little interest or pleasure in doing things 0   Q2: Feeling down, depressed or hopeless 1   PHQ-2 Score 1   Q1: Little interest or pleasure in doing things Not at all   Q2: Feeling down, depressed or hopeless Several days   PHQ-2 Score 1     Today's PHQ-9 Score:   PHQ-9 SCORE 12/3/2019   PHQ-9 Total Score 0     Today's ARACELI-7 Score:   ARACELI-7 SCORE 12/3/2019   Total Score 0       Problem list and histories reviewed & adjusted, as indicated.  Additional history: as documented.    Patient Active Problem List   Diagnosis     Hypothyroidism     Migraine with aura     Cervical high risk HPV (human papillomavirus) test positive     Dysfunctional uterine bleeding     Past Surgical History:   Procedure Laterality Date     AS HYSTEROSCOPY, SURGICAL; W/ ENDOMETRIAL ABLATION, ANY METHOD      [place]    [facility] [place]ATION AND CURETTAGE, OPERATIVE HYSTEROSCOPY, COMBINED N/A 2020    Procedure: HYSTEROSCOPY,  DILATION AND CURETTAGE;  Surgeon: Yon Townsend MD;  Location: [facility] OR     GYN SURGERY      ablation, tubal      SLING BLADDER SUSPENSION WITH FASCIA GREGG        Social History     Tobacco Use     Smoking status: Never Smoker     Smokeless tobacco: Never Used   Substance Use Topics     Alcohol use: Yes     Alcohol/week: 0.0 standard drinks     Comment: social      Problem (# of Occurrences) Relation (Name,Age of  Onset)    Diabetes Type 2  (1) Father    Hypertension (1) Father    No Known Problems (7) Mother, Sister, Brother, Maternal Grandmother, Maternal Grandfather, Paternal Grandmother, Other            Current Outpatient Medications   Medication Sig     Ascorbic Acid (VITAMIN C) 500 MG CAPS Take 1,000 mg by mouth     cholecalciferol (VITAMIN D) 1000 UNIT tablet Take 1,000 Units by mouth     liothyronine (CYTOMEL) 5 MCG tablet      LOW-OGESTREL 0.3-30 MG-MCG tablet TAKE 1 TABLET BY MOUTH EVERY DAY CONTINUOUSLY AND SKIP PLACEBO TABLETS     multivitamin, therapeutic with minerals (MULTI-VITAMIN) TABS      Ondansetron HCl (ZOFRAN PO)      SUMAtriptan (IMITREX) 25 MG tablet TAKE 1 TO 2 TABLETS (25-50MG) BY MOUTH AT ONSET OF HEADACHE FOR MIGRAINE. MAY REPEAT IN 2 HOURS. MAX 8 TABLETS IN 24 HOURS     SYNTHROID 125 MCG tablet      No current facility-administered medications for this visit.      No Known Allergies    ROS:    No urinary frequency or dysuria, bladder or kidney problems, hesitancy      OBJECTIVE:     There were no vitals taken for this visit.  There is no height or weight on file to calculate BMI.    Exam:  Constitutional:  Appearance: Well nourished, well developed alert, in no acute distress     In-Clinic Test Results:      ASSESSMENT/PLAN:                                                      50-year-old patient with post ablation dysfunctional uterine bleeding.  She has been found to have a uterine septum and her initial ablation was probably incomplete.  We will have her back for a vaginal probe ultrasound and discuss appropriate next steps.          Larry Vásquez MD  [facility] [place]

## 2020-11-17 NOTE — PROGRESS NOTES
SUBJECTIVE:                                                   Antoinette Deleon is a 50 year old female who presents to clinic today for the following health issue(s):  Patient presents with:  Ultrasound        HPI: The patient is seen at this time for evaluation of ongoing dysfunctional uterine bleeding.  Originally she had a endometrial ablation and it became apparent that a second hysteroscopy that she had a bicornuate uterus and only one side was ablated.  Her last hysteroscopy had a negative biopsy.  She continues to have heavy bleeding.  Ultrasound is scheduled today.    No LMP recorded. Patient has had an ablation..     Patient is sexually active, .  Using oral contraceptives for contraception.    reports that she has never smoked. She has never used smokeless tobacco.    STD testing offered?  Declined    Health maintenance updated:  yes    Today's PHQ-2 Score:   PHQ-2 (  [employer]) 2019   Q1: Little interest or pleasure in doing things 0   Q2: Feeling down, depressed or hopeless 1   PHQ-2 Score 1   Q1: Little interest or pleasure in doing things Not at all   Q2: Feeling down, depressed or hopeless Several days   PHQ-2 Score 1     Today's PHQ-9 Score:   PHQ-9 SCORE 12/3/2019   PHQ-9 Total Score 0     Today's ARACELI-7 Score:   ARACELI-7 SCORE 12/3/2019   Total Score 0       Problem list and histories reviewed & adjusted, as indicated.  Additional history: as documented.    Patient Active Problem List   Diagnosis     Hypothyroidism     Migraine with aura     Cervical high risk HPV (human papillomavirus) test positive     Dysfunctional uterine bleeding     Past Surgical History:   Procedure Laterality Date     AS HYSTEROSCOPY, SURGICAL; W/ ENDOMETRIAL ABLATION, ANY METHOD      Elicia     [place]    [facility] [place]     DILATION AND CURETTAGE, OPERATIVE HYSTEROSCOPY, COMBINED N/A 2020    Procedure: HYSTEROSCOPY,  DILATION AND CURETTAGE;  Surgeon: Yon Townsend MD;  Location:  OR  "    GYN SURGERY      ablation, tubal      SLING BLADDER SUSPENSION WITH FASCIA GREGG        Social History     Tobacco Use     Smoking status: Never Smoker     Smokeless tobacco: Never Used   Substance Use Topics     Alcohol use: Yes     Alcohol/week: 0.0 standard drinks     Comment: social      Problem (# of Occurrences) Relation (Name,Age of Onset)    Diabetes Type 2  (1) Father    Hypertension (1) Father    No Known Problems (7) Mother, Sister, Brother, Maternal Grandmother, Maternal Grandfather, Paternal Grandmother, Other            Current Outpatient Medications   Medication Sig     Ascorbic Acid (VITAMIN C) 500 MG CAPS Take 1,000 mg by mouth     cholecalciferol (VITAMIN D) 1000 UNIT tablet Take 1,000 Units by mouth     liothyronine (CYTOMEL) 5 MCG tablet      LOW-OGESTREL 0.3-30 MG-MCG tablet TAKE 1 TABLET BY MOUTH EVERY DAY CONTINUOUSLY AND SKIP PLACEBO TABLETS     multivitamin, therapeutic with minerals (MULTI-VITAMIN) TABS      Ondansetron HCl (ZOFRAN PO)      SUMAtriptan (IMITREX) 25 MG tablet TAKE 1 TO 2 TABLETS (25-50MG) BY MOUTH AT ONSET OF HEADACHE FOR MIGRAINE. MAY REPEAT IN 2 HOURS. MAX 8 TABLETS IN 24 HOURS     SYNTHROID 125 MCG tablet      No current facility-administered medications for this visit.      No Known Allergies    ROS:  12 point review of systems negative other than symptoms noted below or in the HPI.  No urinary frequency or dysuria, bladder or kidney problems      OBJECTIVE:     /74   Pulse 68   Ht 1.803 m (5' 11\")   Wt 93.9 kg (207 lb)   BMI 28.87 kg/m    Body mass index is 28.87 kg/m .    Exam:  Constitutional:  Appearance: Well nourished, well developed alert, in no acute distress  Neck:  Lymph Nodes:  No lymphadenopathy present; Thyroid:  Gland size normal, nontender, no nodules or masses present on palpation  Chest:  Respiratory Effort:  Breathing unlabored. Clear to auscultation bilaterally.   Cardiovascular: Heart: Auscultation:  Regular rate, normal rhythm, no " murmurs present  Gastrointestinal:  Abdominal Examination:  Abdomen nontender to palpation, tone normal without rigidity or guarding, no masses present, umbilicus without lesions; Liver/Spleen:  No hepatomegaly present, liver nontender to palpation; Hernias:  No hernias present  Lymphatic: Lymph Nodes:  No other lymphadenopathy present  Skin: General Inspection:  No rashes present, no lesions present, no areas of discoloration.  Neurologic:  Mental Status:  Oriented X3.  Normal strength and tone, sensory exam grossly normal, mentation intact and speech normal.    Psychiatric:  Mentation appears normal and affect normal/bright.  No Pelvic Exam performed today due to ultrasound    In-Clinic Test Results:  Results for orders placed or performed in visit on 11/18/20   US Transvaginal Non OB     Status: None    Narrative    US Transvaginal Non OB  Order #: [id number] Accession #: [id number]  Study Notes     Melissa Urbano on 11/18/2020 10:59 AM      Gynecological Ultrasound Report  Pelvic U/S - Transvaginal  [facility]  Referring Provider: Larry Vásquez MD  Sonographer:  Melissa Urbano RDMS  Indication: Bleeding/Menses- Abnormal uterine bleeding (AUB)  LMP: No LMP recorded. Patient has had an ablation.  History: ablation, hysteroscopy   Gynecological Ultrasonography:   Uterus: mid-position. Contour is smooth/regular possible septate.  Size: 6.37 x 5.27 x 5.27 cm  Endometrium: Thickness Total 3.8 mm  Findings: fluid within the endometrial canal, possible right horn of   septate uterus.  Right Ovary: 2.37 x 2.65 x 1.09 cm. Wnl  Left Ovary: 2.08 x 2.05 x 2.17 cm. Wnl  Cul de Sac Free Fluid: No free fluid     Impression: Probable septate uterus with ablation on the right horn.     MATTHIEU MCGOWAN           Discussed in office         ASSESSMENT/PLAN:                                                        50-year-old patient with history of dysfunctional bleeding and an unusual clinical  situation were I believe she had an incomplete endometrial ablation.  We have presented the option of suppression versus a repeat hysteroscopy sampling and reablation of the right horn and endometrial ablation of the left horn of a septate uterus.  The patient understands the risk and complications.  This includes general anesthesia blood loss infection injury to bowel bladder and ureters.  She will schedule this at her own disposition.        Larry Vásquez MD  [facility] [place]

## 2020-11-18 ENCOUNTER — ANCILLARY PROCEDURE (OUTPATIENT)
Dept: ULTRASOUND IMAGING | Facility: CLINIC | Age: 50
End: 2020-11-18
Payer: COMMERCIAL

## 2020-11-18 ENCOUNTER — TELEPHONE (OUTPATIENT)
Dept: OBGYN | Facility: CLINIC | Age: 50
End: 2020-11-18

## 2020-11-18 ENCOUNTER — PREP FOR PROCEDURE (OUTPATIENT)
Dept: OBGYN | Facility: CLINIC | Age: 50
End: 2020-11-18

## 2020-11-18 ENCOUNTER — OFFICE VISIT (OUTPATIENT)
Dept: OBGYN | Facility: CLINIC | Age: 50
End: 2020-11-18
Payer: COMMERCIAL

## 2020-11-18 VITALS
SYSTOLIC BLOOD PRESSURE: 118 MMHG | WEIGHT: 207 LBS | HEART RATE: 68 BPM | DIASTOLIC BLOOD PRESSURE: 74 MMHG | BODY MASS INDEX: 28.98 KG/M2 | HEIGHT: 71 IN

## 2020-11-18 DIAGNOSIS — N93.8 DUB (DYSFUNCTIONAL UTERINE BLEEDING): Primary | ICD-10-CM

## 2020-11-18 DIAGNOSIS — N93.8 DUB (DYSFUNCTIONAL UTERINE BLEEDING): ICD-10-CM

## 2020-11-18 DIAGNOSIS — Z11.59 ENCOUNTER FOR SCREENING FOR OTHER VIRAL DISEASES: Primary | ICD-10-CM

## 2020-11-18 PROCEDURE — 76830 TRANSVAGINAL US NON-OB: CPT | Performed by: OBSTETRICS & GYNECOLOGY

## 2020-11-18 PROCEDURE — 99213 OFFICE O/P EST LOW 20 MIN: CPT | Performed by: OBSTETRICS & GYNECOLOGY

## 2020-11-18 ASSESSMENT — MIFFLIN-ST. JEOR: SCORE: 1650.08

## 2020-11-18 NOTE — TELEPHONE ENCOUNTER
Type of surgery: [facility] [place]  Location of surgery: [facility]  Date and time of surgery: 12/8/2020 8a  Surgeon: SELENA  Pre-Op Appt Date: 11/18/2020  Post-Op Appt Date: TBD   Packet sent out: HANDED 11/18/2020  Pre-cert/Authorization completed:  TBD  Date: 11/18/2020 Wayne watson/Mimi HOWELL TEST 12/5/2020 10:30a AMADO Rivera  Surgery Scheduler        [facility] [id number]   [id number]

## 2020-11-30 DIAGNOSIS — N93.8 DUB (DYSFUNCTIONAL UTERINE BLEEDING): ICD-10-CM

## 2020-11-30 NOTE — TELEPHONE ENCOUNTER
"Requested Prescriptions   Pending Prescriptions Disp Refills     norgestrel-ethinyl estradiol (LOW-OGESTREL) 0.3-30 MG-MCG tablet 112 tablet 0     Sig: TAKE 1 TABLET BY MOUTH EVERY DAY CONTINUOUSLY AND SKIP PLACEBO TABLETS       Contraceptives Protocol Passed - 11/30/2020 10:29 AM        Passed - Patient is not a current smoker if age is 35 or older        Passed - Recent (12 mo) or future (30 days) visit within the authorizing provider's specialty     Patient has had an office visit with the authorizing provider or a provider within the authorizing providers department within the previous 12 mos or has a future within next 30 days. See \"Patient Info\" tab in inbasket, or \"Choose Columns\" in Meds & Orders section of the refill encounter.              Passed - Medication is active on med list        Passed - No active pregnancy on record        Passed - No positive pregnancy test in past 12 months           Last Written Prescription Date:  8/31/20  Last Fill Quantity: 112,  # refills: 0   Last office visit: 11/18/2020 with prescribing provider:  Dr. Vásquez   Future Office Visit:   Next 5 appointments (look out 90 days)    Dec 05, 2020 10:30 AM  Pre-procedure Covid with AN COVID LAB  [facility] ([facility]) [id number] [address] [facility] [address] [phone]                 "

## 2020-12-01 RX ORDER — NORGESTREL AND ETHINYL ESTRADIOL 0.3-0.03MG
KIT ORAL
Qty: 112 TABLET | Refills: 0 | Status: ON HOLD | OUTPATIENT
Start: 2020-12-01 | End: 2020-12-08

## 2020-12-01 NOTE — TELEPHONE ENCOUNTER
Procedure scheduled with Dr Vásquez 12/8/20 for DUB    Pres[place] per [facility] [place].  Monalisa Gill RN on 12/1/2020 at 8:00 AM

## 2020-12-03 NOTE — TELEPHONE ENCOUNTER
Pt called and needs to work the AM of 12/6. Wanted covid test RS  RS to 12/6 12:40pm at [facility] per patient request    Mimi Rivera  Surgery Scheduler

## 2020-12-06 DIAGNOSIS — Z11.59 ENCOUNTER FOR SCREENING FOR OTHER VIRAL DISEASES: ICD-10-CM

## 2020-12-06 PROCEDURE — U0003 INFECTIOUS AGENT DETECTION BY NUCLEIC ACID (DNA OR RNA); SEVERE ACUTE RESPIRATORY SYNDROME CORONAVIRUS 2 (SARS-COV-2) (CORONAVIRUS DISEASE [COVID-19]), AMPLIFIED PROBE TECHNIQUE, MAKING USE OF HIGH THROUGHPUT TECHNOLOGIES AS DESCRIBED BY CMS-2020-01-R: HCPCS | Performed by: OBSTETRICS & GYNECOLOGY

## 2020-12-07 LAB
LABORATORY COMMENT REPORT: NORMAL
SARS-COV-2 RNA SPEC QL NAA+PROBE: NEGATIVE
SARS-COV-2 RNA SPEC QL NAA+PROBE: NORMAL
SPECIMEN SOURCE: NORMAL
SPECIMEN SOURCE: NORMAL

## 2020-12-07 NOTE — H&P
2020  [facility] Larry Berger MD  OB/Gyn  DUB (dysfunctional uterine bleeding)  Dx  Ultrasound ; Referred by Monalisa James APRN CNP  Reason for Visit   Progress Notes             SUBJECTIVE:                                                   Antoinette Deleon is a 50 year old female who presents to clinic today for the following health issue(s):  Patient presents with:  Ultrasound           HPI: The patient is seen at this time for evaluation of ongoing dysfunctional uterine bleeding.  Originally she had a endometrial ablation and it became apparent that a second hysteroscopy that she had a bicornuate uterus and only one side was ablated.  Her last hysteroscopy had a negative biopsy.  She continues to have heavy bleeding.  Ultrasound is scheduled today.     No LMP recorded. Patient has had an ablation..      Patient is sexually active, .  Using oral contraceptives for contraception.    reports that she has never smoked. She has never used smokeless tobacco.     STD testing offered?  Declined     Health maintenance updated:  yes     Today's PHQ-2 Score:   PHQ-2 (  [employer]) 2019   Q1: Little interest or pleasure in doing things 0   Q2: Feeling down, depressed or hopeless 1   PHQ-2 Score 1   Q1: Little interest or pleasure in doing things Not at all   Q2: Feeling down, depressed or hopeless Several days   PHQ-2 Score 1      Today's PHQ-9 Score:   PHQ-9 SCORE 12/3/2019   PHQ-9 Total Score 0      Today's ARACELI-7 Score:   ARACELI-7 SCORE 12/3/2019   Total Score 0         Problem list and histories reviewed & adjusted, as indicated.  Additional history: as documented.         Patient Active Problem List   Diagnosis     Hypothyroidism     Migraine with aura     Cervical high risk HPV (human papillomavirus) test positive     Dysfunctional uterine bleeding             Past Surgical History:   Procedure Laterality Date     AS HYSTEROSCOPY, SURGICAL; W/ ENDOMETRIAL  "ABLATION, ANY METHOD         Elicia     [place]   08[place]     [facility] [place]     DILATION AND CURETTAGE, OPERATIVE HYSTEROSCOPY, COMBINED N/A 2/5/2020     Procedure: HYSTEROSCOPY,  DILATION AND CURETTAGE;  Surgeon: Yon Townsend MD;  Location: [facility] OR     GYN SURGERY         ablation, tubal      SLING BLADDER SUSPENSION WITH FASCIA GREGG           Social History            Tobacco Use     Smoking status: Never Smoker     Smokeless tobacco: Never Used   Substance Use Topics     Alcohol use: Yes       Alcohol/week: 0.0 standard drinks       Comment: social       Problem (# of Occurrences) Relation (Name,Age of Onset)     Diabetes Type 2  (1) Father     Hypertension (1) Father     No Known Problems (7) Mother, Sister, Brother, Maternal Grandmother, Maternal Grandfather, Paternal Grandmother, Other                   Current Outpatient Medications   Medication Sig     Ascorbic Acid (VITAMIN C) 500 MG CAPS Take 1,000 mg by mouth     cholecalciferol (VITAMIN D) 1000 UNIT tablet Take 1,000 Units by mouth     liothyronine (CYTOMEL) 5 MCG tablet       LOW-OGESTREL 0.3-30 MG-MCG tablet TAKE 1 TABLET BY MOUTH EVERY DAY CONTINUOUSLY AND SKIP PLACEBO TABLETS     multivitamin, therapeutic with minerals (MULTI-VITAMIN) TABS       Ondansetron HCl (ZOFRAN PO)       SUMAtriptan (IMITREX) 25 MG tablet TAKE 1 TO 2 TABLETS (25-50MG) BY MOUTH AT ONSET OF HEADACHE FOR MIGRAINE. MAY REPEAT IN 2 HOURS. MAX 8 TABLETS IN 24 HOURS     SYNTHROID 125 MCG tablet        No current facility-administered medications for this visit.       No Known Allergies     ROS:  12 point review of systems negative other than symptoms noted below or in the HPI.  No urinary frequency or dysuria, bladder or kidney problems        OBJECTIVE:      /74   Pulse 68   Ht 1.803 m (5' 11\")   Wt 93.9 kg (207 lb)   BMI 28.87 kg/m    Body mass index is 28.87 kg/m .     Exam:  Constitutional:  Appearance: Well nourished, well developed alert, in no acute " distress  Neck:  Lymph Nodes:  No lymphadenopathy present; Thyroid:  Gland size normal, nontender, no nodules or masses present on palpation  Chest:  Respiratory Effort:  Breathing unlabored. Clear to auscultation bilaterally.   Cardiovascular: Heart: Auscultation:  Regular rate, normal rhythm, no murmurs present  Gastrointestinal:  Abdominal Examination:  Abdomen nontender to palpation, tone normal without rigidity or guarding, no masses present, umbilicus without lesions; Liver/Spleen:  No hepatomegaly present, liver nontender to palpation; Hernias:  No hernias present  Lymphatic: Lymph Nodes:  No other lymphadenopathy present  Skin: General Inspection:  No rashes present, no lesions present, no areas of discoloration.  Neurologic:  Mental Status:  Oriented X3.  Normal strength and tone, sensory exam grossly normal, mentation intact and speech normal.    Psychiatric:  Mentation appears normal and affect normal/bright.  No Pelvic Exam performed today due to ultrasound     In-Clinic Test Results:      Results for orders placed or performed in visit on 11/18/20   US Transvaginal Non OB     Status: None     Narrative     US Transvaginal Non OB  Order #: [id number] Accession #: [id number]  Study Notes     Melissa Urbano on 11/18/2020 10:59 AM      Gynecological Ultrasound Report  Pelvic U/S - Transvaginal  [facility]  Referring Provider: Larry Vásquez MD  Sonographer:  Melissa Urbano RDMS  Indication: Bleeding/Menses- Abnormal uterine bleeding (AUB)  LMP: No LMP recorded. Patient has had an ablation.  History: ablation, hysteroscopy   Gynecological Ultrasonography:   Uterus: mid-position. Contour is smooth/regular possible septate.  Size: 6.37 x 5.27 x 5.27 cm  Endometrium: Thickness Total 3.8 mm  Findings: fluid within the endometrial canal, possible right horn of   septate uterus.  Right Ovary: 2.37 x 2.65 x 1.09 cm. Wnl  Left Ovary: 2.08 x 2.05 x 2.17 cm. Wnl  Cul de Sac Free Fluid:  "No free fluid     Impression: Probable septate uterus with ablation on the right horn.     MATTHIEU MCGOWAN            Discussed in office            ASSESSMENT/PLAN:                                                          50-year-old patient with history of dysfunctional bleeding and an unusual clinical situation were I believe she had an incomplete endometrial ablation.  We have presented the option of suppression versus a repeat hysteroscopy sampling and reablation of the right horn and endometrial ablation of the left horn of a septate uterus.  The patient understands the risk and complications.  This includes general anesthesia blood loss infection injury to bowel bladder and ureters.  She will schedule this at her own disposition.           Larry Vásquez MD  [facility] [place]      Instructions     After Visit Summary (Automatic SnapShot taken 11/18/2020)  Additional Documentation    Vitals:    /74   Pulse 68   Ht 1.803 m (5' 11\")   Wt 93.9 kg (207 lb)   BMI 28.87 kg/m    BSA 2.17 m    Flowsheets:    Vitals Reassessment,   NICU VS,   Anthropometrics      Encounter Info:    Billing Info,   History,   Allergies,   Detailed Report      AVS Reports    Date/Time Report Action User   11/18/2020 11:21 AM After Visit Summary Automatically Generated Larry Vásquez MD   Encounter Information     Provider Department Encounter # [place]   11/18/2020 11:15 AM Larry Vásquez MD [facility] Ob/Gyn [id number] [facility]   Reviewed this Encounter     Medications Problems Allergies History   Arline Fleming MA   Reviewed ADL, Alcohol, Drug Use, Family, Medical, Sexual Activity, Surgical, Tobacco   Communicable/Travel screen    Communicable/Travel Screening 2/5/2020 10/14/2020 11/5/2020 11/18/2020 12/6/2020   In the last month, have you been in contact with someone who was confirmed or suspected to have Coronavirus / COVID-19? - No / Unsure No / Unsure No / Unsure Unable to assess   Do you have any " of the following symptoms? None of these - None of these None of these -   Have you traveled internationally in the last month? No No No No Unable to assess   View Complete Flowsheet ...More Data Exists   Orders Placed     None  Medication Changes       None     Medication List   Visit Diagnoses       DUB (dysfunctional uterine bleeding)     Problem List

## 2020-12-08 ENCOUNTER — ANESTHESIA EVENT (OUTPATIENT)
Dept: SURGERY | Facility: CLINIC | Age: 50
End: 2020-12-08
Payer: COMMERCIAL

## 2020-12-08 ENCOUNTER — HOSPITAL ENCOUNTER (OUTPATIENT)
Facility: CLINIC | Age: 50
Discharge: HOME | End: 2020-12-08
Attending: OBSTETRICS & GYNECOLOGY | Admitting: OBSTETRICS & GYNECOLOGY
Payer: COMMERCIAL

## 2020-12-08 ENCOUNTER — ANESTHESIA (OUTPATIENT)
Dept: SURGERY | Facility: CLINIC | Age: 50
End: 2020-12-08
Payer: COMMERCIAL

## 2020-12-08 ENCOUNTER — SURGERY (OUTPATIENT)
Age: 50
End: 2020-12-08
Payer: COMMERCIAL

## 2020-12-08 VITALS
DIASTOLIC BLOOD PRESSURE: 96 MMHG | HEART RATE: 75 BPM | SYSTOLIC BLOOD PRESSURE: 130 MMHG | RESPIRATION RATE: 13 BRPM | BODY MASS INDEX: 28.03 KG/M2 | WEIGHT: 201 LBS | OXYGEN SATURATION: 100 % | TEMPERATURE: 99.7 F

## 2020-12-08 DIAGNOSIS — N93.8 DUB (DYSFUNCTIONAL UTERINE BLEEDING): ICD-10-CM

## 2020-12-08 LAB — B-HCG SERPL-ACNC: <1 IU/L (ref 0–5)

## 2020-12-08 PROCEDURE — 761N000007 HC RECOVERY PHASE 2 EACH 15 MINS: Performed by: OBSTETRICS & GYNECOLOGY

## 2020-12-08 PROCEDURE — 258N000001 HC RX 258: Performed by: OBSTETRICS & GYNECOLOGY

## 2020-12-08 PROCEDURE — 84702 CHORIONIC GONADOTROPIN TEST: CPT | Performed by: OBSTETRICS & GYNECOLOGY

## 2020-12-08 PROCEDURE — 58563 HYSTEROSCOPY ABLATION: CPT | Mod: 51 | Performed by: OBSTETRICS & GYNECOLOGY

## 2020-12-08 PROCEDURE — 999N000139 HC STATISTIC PRE-PROCEDURE ASSESSMENT II: Performed by: OBSTETRICS & GYNECOLOGY

## 2020-12-08 PROCEDURE — 761N000001 HC RECOVERY PHASE 1 LEVEL 1 FIRST HR: Performed by: OBSTETRICS & GYNECOLOGY

## 2020-12-08 PROCEDURE — 58560 HYSTEROSCOPY RESECT SEPTUM: CPT | Performed by: OBSTETRICS & GYNECOLOGY

## 2020-12-08 PROCEDURE — 250N000011 HC RX IP 250 OP 636: Performed by: REGISTERED NURSE

## 2020-12-08 PROCEDURE — 250N000009 HC RX 250: Performed by: REGISTERED NURSE

## 2020-12-08 PROCEDURE — 250N000003 HC SEVOFLURANE, EA 15 MIN: Performed by: OBSTETRICS & GYNECOLOGY

## 2020-12-08 PROCEDURE — 360N000020 HC SURGERY LEVEL 3 1ST 30 MIN: Performed by: OBSTETRICS & GYNECOLOGY

## 2020-12-08 PROCEDURE — 370N000002 HC ANESTHESIA TECHNICAL FEE, EACH ADDTL 15 MIN: Performed by: OBSTETRICS & GYNECOLOGY

## 2020-12-08 PROCEDURE — 272N000001 HC OR GENERAL SUPPLY STERILE: Performed by: OBSTETRICS & GYNECOLOGY

## 2020-12-08 PROCEDURE — 250N000009 HC RX 250: Performed by: OBSTETRICS & GYNECOLOGY

## 2020-12-08 PROCEDURE — 370N000001 HC ANESTHESIA TECHNICAL FEE, 1ST 30 MIN: Performed by: OBSTETRICS & GYNECOLOGY

## 2020-12-08 PROCEDURE — 258N000003 HC RX IP 258 OP 636: Performed by: REGISTERED NURSE

## 2020-12-08 PROCEDURE — 250N000013 HC RX MED GY IP 250 OP 250 PS 637: Performed by: ANESTHESIOLOGY

## 2020-12-08 PROCEDURE — 360N000021 HC SURGERY LEVEL 3 EA 15 ADDTL MIN: Performed by: OBSTETRICS & GYNECOLOGY

## 2020-12-08 PROCEDURE — 36415 COLL VENOUS BLD VENIPUNCTURE: CPT | Performed by: OBSTETRICS & GYNECOLOGY

## 2020-12-08 PROCEDURE — 250N000013 HC RX MED GY IP 250 OP 250 PS 637: Performed by: OBSTETRICS & GYNECOLOGY

## 2020-12-08 RX ORDER — FENTANYL CITRATE 50 UG/ML
INJECTION, SOLUTION INTRAMUSCULAR; INTRAVENOUS PRN
Status: DISCONTINUED | OUTPATIENT
Start: 2020-12-08 | End: 2020-12-08

## 2020-12-08 RX ORDER — NALOXONE HYDROCHLORIDE 0.4 MG/ML
0.2 INJECTION, SOLUTION INTRAMUSCULAR; INTRAVENOUS; SUBCUTANEOUS
Status: DISCONTINUED | OUTPATIENT
Start: 2020-12-08 | End: 2020-12-08 | Stop reason: HOSPADM

## 2020-12-08 RX ORDER — PHENAZOPYRIDINE HYDROCHLORIDE 200 MG/1
200 TABLET, FILM COATED ORAL ONCE
Status: COMPLETED | OUTPATIENT
Start: 2020-12-08 | End: 2020-12-08

## 2020-12-08 RX ORDER — PROPOFOL 10 MG/ML
INJECTION, EMULSION INTRAVENOUS CONTINUOUS PRN
Status: DISCONTINUED | OUTPATIENT
Start: 2020-12-08 | End: 2020-12-08

## 2020-12-08 RX ORDER — MAGNESIUM HYDROXIDE 1200 MG/15ML
LIQUID ORAL PRN
Status: DISCONTINUED | OUTPATIENT
Start: 2020-12-08 | End: 2020-12-08 | Stop reason: HOSPADM

## 2020-12-08 RX ORDER — LIDOCAINE HYDROCHLORIDE 20 MG/ML
INJECTION, SOLUTION INFILTRATION; PERINEURAL PRN
Status: DISCONTINUED | OUTPATIENT
Start: 2020-12-08 | End: 2020-12-08

## 2020-12-08 RX ORDER — NALOXONE HYDROCHLORIDE 0.4 MG/ML
0.4 INJECTION, SOLUTION INTRAMUSCULAR; INTRAVENOUS; SUBCUTANEOUS
Status: DISCONTINUED | OUTPATIENT
Start: 2020-12-08 | End: 2020-12-08 | Stop reason: HOSPADM

## 2020-12-08 RX ORDER — SODIUM CHLORIDE, SODIUM LACTATE, POTASSIUM CHLORIDE, CALCIUM CHLORIDE 600; 310; 30; 20 MG/100ML; MG/100ML; MG/100ML; MG/100ML
INJECTION, SOLUTION INTRAVENOUS CONTINUOUS
Status: DISCONTINUED | OUTPATIENT
Start: 2020-12-08 | End: 2020-12-08 | Stop reason: HOSPADM

## 2020-12-08 RX ORDER — SODIUM CHLORIDE, SODIUM LACTATE, POTASSIUM CHLORIDE, CALCIUM CHLORIDE 600; 310; 30; 20 MG/100ML; MG/100ML; MG/100ML; MG/100ML
INJECTION, SOLUTION INTRAVENOUS CONTINUOUS PRN
Status: DISCONTINUED | OUTPATIENT
Start: 2020-12-08 | End: 2020-12-08

## 2020-12-08 RX ORDER — FENTANYL CITRATE 50 UG/ML
25-50 INJECTION, SOLUTION INTRAMUSCULAR; INTRAVENOUS
Status: CANCELLED | OUTPATIENT
Start: 2020-12-08

## 2020-12-08 RX ORDER — HYDROMORPHONE HYDROCHLORIDE 1 MG/ML
.3-.5 INJECTION, SOLUTION INTRAMUSCULAR; INTRAVENOUS; SUBCUTANEOUS EVERY 10 MIN PRN
Status: DISCONTINUED | OUTPATIENT
Start: 2020-12-08 | End: 2020-12-08 | Stop reason: HOSPADM

## 2020-12-08 RX ORDER — HYDROCODONE BITARTRATE AND ACETAMINOPHEN 5; 325 MG/1; MG/1
1 TABLET ORAL
Status: COMPLETED | OUTPATIENT
Start: 2020-12-08 | End: 2020-12-08

## 2020-12-08 RX ORDER — ONDANSETRON 4 MG/1
4 TABLET, ORALLY DISINTEGRATING ORAL EVERY 30 MIN PRN
Status: DISCONTINUED | OUTPATIENT
Start: 2020-12-08 | End: 2020-12-08 | Stop reason: HOSPADM

## 2020-12-08 RX ORDER — KETOROLAC TROMETHAMINE 30 MG/ML
INJECTION, SOLUTION INTRAMUSCULAR; INTRAVENOUS PRN
Status: DISCONTINUED | OUTPATIENT
Start: 2020-12-08 | End: 2020-12-08

## 2020-12-08 RX ORDER — DEXAMETHASONE SODIUM PHOSPHATE 4 MG/ML
INJECTION, SOLUTION INTRA-ARTICULAR; INTRALESIONAL; INTRAMUSCULAR; INTRAVENOUS; SOFT TISSUE PRN
Status: DISCONTINUED | OUTPATIENT
Start: 2020-12-08 | End: 2020-12-08

## 2020-12-08 RX ORDER — HYDROCODONE BITARTRATE AND ACETAMINOPHEN 5; 325 MG/1; MG/1
1-2 TABLET ORAL EVERY 4 HOURS PRN
Qty: 10 TABLET | Refills: 0 | Status: SHIPPED | OUTPATIENT
Start: 2020-12-08 | End: 2020-12-17

## 2020-12-08 RX ORDER — FENTANYL CITRATE 50 UG/ML
25-50 INJECTION, SOLUTION INTRAMUSCULAR; INTRAVENOUS
Status: DISCONTINUED | OUTPATIENT
Start: 2020-12-08 | End: 2020-12-08 | Stop reason: HOSPADM

## 2020-12-08 RX ORDER — ACETAMINOPHEN 500 MG
1000 TABLET ORAL ONCE
Status: COMPLETED | OUTPATIENT
Start: 2020-12-08 | End: 2020-12-08

## 2020-12-08 RX ORDER — ONDANSETRON 2 MG/ML
4 INJECTION INTRAMUSCULAR; INTRAVENOUS EVERY 30 MIN PRN
Status: DISCONTINUED | OUTPATIENT
Start: 2020-12-08 | End: 2020-12-08 | Stop reason: HOSPADM

## 2020-12-08 RX ORDER — PROPOFOL 10 MG/ML
INJECTION, EMULSION INTRAVENOUS PRN
Status: DISCONTINUED | OUTPATIENT
Start: 2020-12-08 | End: 2020-12-08

## 2020-12-08 RX ORDER — ONDANSETRON 2 MG/ML
INJECTION INTRAMUSCULAR; INTRAVENOUS PRN
Status: DISCONTINUED | OUTPATIENT
Start: 2020-12-08 | End: 2020-12-08

## 2020-12-08 RX ADMIN — SODIUM CHLORIDE 1000 ML: 900 IRRIGANT IRRIGATION at 08:03

## 2020-12-08 RX ADMIN — FENTANYL CITRATE 50 MCG: 50 INJECTION, SOLUTION INTRAMUSCULAR; INTRAVENOUS at 08:08

## 2020-12-08 RX ADMIN — SODIUM CHLORIDE 1000 ML: 900 IRRIGANT IRRIGATION at 08:27

## 2020-12-08 RX ADMIN — PROPOFOL 150 MCG/KG/MIN: 10 INJECTION, EMULSION INTRAVENOUS at 07:52

## 2020-12-08 RX ADMIN — DEXAMETHASONE SODIUM PHOSPHATE 4 MG: 4 INJECTION, SOLUTION INTRA-ARTICULAR; INTRALESIONAL; INTRAMUSCULAR; INTRAVENOUS; SOFT TISSUE at 07:56

## 2020-12-08 RX ADMIN — LIDOCAINE HYDROCHLORIDE 60 MG: 20 INJECTION, SOLUTION INFILTRATION; PERINEURAL at 07:52

## 2020-12-08 RX ADMIN — ONDANSETRON 4 MG: 2 INJECTION INTRAMUSCULAR; INTRAVENOUS at 08:00

## 2020-12-08 RX ADMIN — SODIUM CHLORIDE 1000 ML: 900 IRRIGANT IRRIGATION at 08:15

## 2020-12-08 RX ADMIN — KETOROLAC TROMETHAMINE 30 MG: 30 INJECTION, SOLUTION INTRAMUSCULAR at 08:29

## 2020-12-08 RX ADMIN — PROPOFOL 200 MG: 10 INJECTION, EMULSION INTRAVENOUS at 07:52

## 2020-12-08 RX ADMIN — FENTANYL CITRATE 50 MCG: 50 INJECTION, SOLUTION INTRAMUSCULAR; INTRAVENOUS at 07:52

## 2020-12-08 RX ADMIN — MIDAZOLAM 2 MG: 1 INJECTION INTRAMUSCULAR; INTRAVENOUS at 07:47

## 2020-12-08 RX ADMIN — HYDROCODONE BITARTRATE AND ACETAMINOPHEN 1 TABLET: 5; 325 TABLET ORAL at 09:12

## 2020-12-08 RX ADMIN — PHENAZOPYRIDINE HYDROCHLORIDE 200 MG: 200 TABLET ORAL at 07:02

## 2020-12-08 RX ADMIN — SODIUM CHLORIDE, POTASSIUM CHLORIDE, SODIUM LACTATE AND CALCIUM CHLORIDE: 600; 310; 30; 20 INJECTION, SOLUTION INTRAVENOUS at 07:47

## 2020-12-08 RX ADMIN — SODIUM CHLORIDE 1000 ML: 900 IRRIGANT IRRIGATION at 08:24

## 2020-12-08 RX ADMIN — SODIUM CHLORIDE 1000 ML: 900 IRRIGANT IRRIGATION at 08:00

## 2020-12-08 RX ADMIN — ACETAMINOPHEN 1000 MG: 500 TABLET, FILM COATED ORAL at 07:37

## 2020-12-08 ASSESSMENT — ENCOUNTER SYMPTOMS
SEIZURES: 0
ORTHOPNEA: 0

## 2020-12-08 NOTE — ANESTHESIA CARE TRANSFER NOTE
Patient: Antoinette Deleon    Procedure(s):  HYSTEROSCOPY, SEPTUM RESECTION, CERVICAL DILATION AND ENDOMETRIAL ABLATION    Diagnosis: DUB (dysfunctional uterine bleeding) [N93.8]  Diagnosis Additional Information: No value filed.    Anesthesia Type:   General     Note:  Airway :Face Mask  Patient transferred to:PACU  Comments: Transferred to PACU, spontaneous respirations, 10L oxygen via facemask.  All monitors and alarms on and functioning, VSS.  Patient awake, comfortable.  Report to PACU RN.Handoff Report: Identifed the Patient, Identified the Reponsible Provider, Reviewed the pertinent medical history, Discussed the surgical course, Reviewed Intra-OP anesthesia mangement and issues during anesthesia, Set expectations for post-procedure period and Allowed opportunity for questions and acknowledgement of understanding      Vitals: (Last set prior to Anesthesia Care Transfer)    CRNA VITALS  12/8/2020 0808 - 12/8/2020 0842      12/8/2020             Pulse:  104    SpO2:  99 %    Resp Rate (set):  10                Electronically Signed By: PHILOMENA Bridges CRNA  December 8, 2020  8:42 AM

## 2020-12-08 NOTE — BRIEF OP NOTE
[facility]    Brief Operative Note    Pre-operative diagnosis: DUB (dysfunctional uterine bleeding) [N93.8]  Post-operative diagnosis Same as pre-operative diagnosis,uterine septum    Procedure: Procedure(s):  HYSTEROSCOPY, SEPTUM RESECTION, CERVICAL DILATION AND ENDOMETRIAL ABLATION  Surgeon: Surgeon(s) and Role:     * Larry Vásquez MD - Primary  Anesthesia: General   Estimated blood loss: Minimal  Drains: None  Specimens: none  Findings:   None.  Complications: None.

## 2020-12-08 NOTE — DISCHARGE INSTRUCTIONS
Same Day Surgery Discharge Instructions for  Sedation and General Anesthesia       It's not unusual to feel dizzy, light-headed or faint for up to 24 hours after surgery or while taking pain medication.  If you have these symptoms: sit for a few minutes before standing and have someone assist you when you get up to walk or use the bathroom.      You should rest and relax for the next 24 hours. We recommend you make arrangements to have an adult stay with you for at least 24 hours after your discharge.  Avoid hazardous and strenuous activity.      DO NOT DRIVE any vehicle or operate mechanical equipment for 24 hours following the end of your surgery.  Even though you may feel normal, your reactions may be affected by the medication you have received.      Do not drink alcoholic beverages for 24 hours following surgery.       Slowly progress to your regular diet as you feel able. It's not unusual to feel nauseated and/or vomit after receiving anesthesia.  If you develop these symptoms, drink clear liquids (apple juice, ginger ale, broth, 7-up, etc. ) until you feel better.  If your nausea and vomiting persists for 24 hours, please notify your surgeon.        All narcotic pain medications, along with inactivity and anesthesia, can cause constipation. Drinking plenty of liquids and increasing fiber intake will help.      For any questions of a medical nature, call your surgeon.      Do not make important decisions for 24 hours.      If you had general anesthesia, you may have a sore throat for a couple of days related to the breathing tube used during surgery.  You may use Cepacol lozenges to help with this discomfort.  If it worsens or if you develop a fever, contact your surgeon.       If you feel your pain is not well managed with the pain medications prescribed by your surgeon, please contact your surgeon's office to let them know so they can address your concerns.       CoVid 19 Information    We want to give you  information regarding Covid. Please consult your primary care provider with any questions you might have.     Patient who have symptoms (cough, fever, or shortness of breath), need to isolate for 7 days from when symptoms started OR 72 hours after fever resolves (without fever reducing medications) AND improvement of respiratory symptoms (whichever is longer).      Isolate yourself at home (in own room/own bathroom if possible)    Do Not allow any visitors    Do Not go to work or school    Do Not go to Baptism,  centers, shopping, or other public places.    Do Not shake hands.    Avoid close and intimate contact with others (hugging, kissing).    Follow CDC recommendations for household cleaning of frequently touched services.     After the initial 7 days, continue to isolate yourself from household members as much as possible. To continue decrease the risk of community spread and exposure, you and any members of your household should limit activities in public for 14 days after starting home isolation.     You can reference the following CDC link for helpful home isolation/care tips:  https://www.cdc.gov/coronavirus/2019-ncov/downloads/10Things.pdf    Protect Others:    Cover Your Mouth and Nose with a mask, disposable tissue or wash cloth to avoid spreading germs to others.    Wash your hands and face frequently with soap and water    Call Your Primary Doctor If: Breathing difficulty develops or you become worse.    For more information about COVID19 and options for caring for yourself at home, please visit the CDC website at https://www.cdc.gov/coronavirus/2019-ncov/about/steps-when-sick.html  For more options for care at [facility], please visit our website at [email or web address]      Today you were given 1,000 mg of Tylenol at 7:37 AM. The recommended daily maximum dose is 4000 mg.       Today you received Toradol, an antiinflammatory medication similar to Ibuprofen.   You should not take other antiinflammatory medication, such as Ibuprofen, Motrin, Advil, Aleve, Naprosyn, etc until 2:30 PM.       While you were at the hospital today you were given a medication called Pyridium.  This is used to treat pain, burning, increased urination, and increased urge to urinate.    Pyridium will most likely darken the color of your urine to an orange or red color. Darkened urine may also cause stains to your underwear, which may or may not be removed by laundering.          [facility]  Discharge Instructions  Following D & C / Hysteroscopy    Activity  You may resume normal activities including lifting as needed.  It is permissible to climb stairs. You may drive after 24 hours as long as you are not taking narcotic pain pills.  Baths or showers are perfectly acceptable.      Vaginal Discharge  You may have some vaginal bleeding or discharge for about a week after procedure.  You may use tampons or pads.    Temperature  If you develop temperature elevations to over 101  Fahrenheit, your physician should be called immediately.    Diet  [place] or light diet is advisable the day of surgery.  If nausea persists, continue this diet.  If severe, call.    Follow-up  Make an appointment in 1-2 weeks if instructed to at: [phone]        [facility]  [phone]      Post-Operative Instructions Following Endometrial Ablation    After endometrial ablation, some women experience some cramping, mild pain, nausea and/or vomiting.  Most women feel back to themselves and can resume normal activities within a day or two.      DRAINAGE:  Watery and/or bloody discharge following endometrial ablation is normal.  Reddish, yellow or brown discharge is normal.  Light spotting may last up to a few weeks.  Discharge may increase with certain activities.      Do not use tampons, douches or resume intercourse until bleeding has stopped and as directed by your surgeon.    Reasons to  call your Surgeon:    1.  A fever higher than 100.4   2.  Worsening pelvic pain that is not relieved prescribed pain medication  3.  Nausea, vomiting or shortness of breath  4.  A greenish vaginal discharge.        **If you have questions or concerns about your procedure,   call Dr. Vásquez at [phone]**

## 2020-12-08 NOTE — ANESTHESIA PROCEDURE NOTES
Airway   Date/Time: 12/8/2020 7:54 AM   Patient location during procedure: OR    Staff -   Anesthesiologist:  Jhony Kessler MD  CRNA: Rima Pastrana APRN CRNA  Performed By: CRNA    Consent for Airway   Urgency: elective    Indications and Patient Condition  Indications for airway management: silvestre-procedural  Induction type:intravenousMask difficulty assessment: 0 - not attempted    Final Airway Details  Final airway type: supraglottic airway    Endotracheal Airway Details   Secured with: other (comment) (secured via airway tubing positioning)    Post intubation assessment   Placement verified by: capnometry, equal breath sounds and chest rise   Number of attempts at approach: 1  Number of other approaches attempted: 0  Secured with:other (comment) (secured via airway tubing positioning)  Ease of procedure: easy  Dentition: Intact and Unchanged

## 2020-12-08 NOTE — OP NOTE
Procedure Date: 12/08/2020      PREOPERATIVE DIAGNOSIS:  Perimenopausal dysfunctional uterine bleeding.      POSTOPERATIVE DIAGNOSES:  Perimenopausal dysfunctional uterine bleeding, uterine septum.      PREOPERATIVE STATUS:  The patient is a 50-year-old who has undergone a previous endometrial ablation, but continues to bleed heavily.  Her last ultrasound shows that she has a septated uterus and it may be that there was an incomplete first ablation.      OPERATIVE PROCEDURES:  Hysteroscopy, uterine septum resection, endometrial ablation.      OPERATIVE FINDINGS:  The patient had a deep septum that came down to the lower uterine segment.  Once this was taken down, we could easily visualize both tubal ostia.  An ablation of the entire cavity to the lower uterine segment was accomplished with both coagulating and cutting current.      DESCRIPTION OF PROCEDURE:  After general anesthesia was induced, the patient was placed in the dorsal lithotomy position and prepped and draped in the usual fashion.  The cervix was grasped and carefully dilated.  The hysteroscope was placed with the above findings noted.  The Versapoint was brought in and at both coagulating and cutting current, the entire septum was taken down all the way to the fundus.  Both ostia were coagulated.  In a stepwise fashion, we ablated the entire cavity from the ostia to the lower uterine segment.  Fluid deficit was minimal.  Blood loss was minimal.  The patient tolerated this very well.  At the end of the case, the cavity showed an excellent pattern of distribution of ablation.  She will be given Toradol in the recovery room.  She is sent home with Vicodin and is asked to call for any fever, chills, change in bowel or bladder function.  She should expect a watery discharge, but needs to report any bright red bleeding.         NIHARIKA WALTERS JR, MD             D: 12/08/2020   T: 12/08/2020   MT: ECTOR      Name:     EMILY TEJEDA   MRN:       [id number]        Account:        [id number]   :      1970           Procedure Date: 2020      Document: [id number]

## 2020-12-08 NOTE — ANESTHESIA PREPROCEDURE EVALUATION
Anesthesia Pre-Procedure Evaluation    Patient: Antoinette Deleon   MRN: [id number] : 1970          Preoperative Diagnosis: DUB (dysfunctional uterine bleeding) [N93.8]    Procedure(s):  HYSTEROSCOPY, WITH CERVICAL DILATION AND ENDOMETRIAL ABLATION    Past Medical History:   Diagnosis Date     Cervical high risk HPV (human papillomavirus) test positive 2018 NIL pap, + HR HPV (not 16/18). Plan: cotest in 1 year     Menorrhagia      Thyroid disease      Past Surgical History:   Procedure Laterality Date     AS HYSTEROSCOPY, SURGICAL; W/ ENDOMETRIAL ABLATION, ANY METHOD      [place]licia     [place]DER [place]  [place]    [facility] [place]     DILATION AND CURETTAGE, OPERATIVE HYSTEROSCOPY, COMBINED N/A 2020    Procedure: HYSTEROSCOPY,  DILATION AND CURETTAGE;  Surgeon: Yon Townsend MD;  Location: [facility] OR     GYN SURGERY      ablation, tubal      SLING BLADDER SUSPENSION WITH FASCIA GREGG         Anesthesia Evaluation     . Pt has had prior anesthetic.     No history of anesthetic complications          ROS/MED HX    ENT/Pulmonary:      (-) sleep apnea and recent URI   Neurologic:     (+)migraines,    (-) seizures and CVA   Cardiovascular:        (-) hypertension, CHF and orthopnea/PND   METS/Exercise Tolerance:     Hematologic:  - neg hematologic  ROS       Musculoskeletal:  - neg musculoskeletal ROS       GI/Hepatic:  - neg GI/hepatic ROS      (-) GERD   Renal/Genitourinary:  - ROS Renal section negative       Endo:     (+) thyroid problem hypothyroidism, .   (-) Type II DM   Psychiatric:  - neg psychiatric ROS       Infectious Disease:  - neg infectious disease ROS       Malignancy:      - no malignancy   Other:                          Physical Exam  Normal systems: dental    Airway   Mallampati: I  TM distance: >3 FB  Neck ROM: full    Dental     Cardiovascular   Rhythm and rate: regular and normal      Pulmonary    breath sounds clear to auscultation            Lab Results   Component Value  "Date    WBC 8.5 07/29/2019    HGB 14.8 07/29/2019    HCT 43.9 07/29/2019     07/29/2019     07/29/2019    POTASSIUM 4.4 07/29/2019    CHLORIDE 106 07/29/2019    CO2 26 07/29/2019    BUN 12 07/29/2019    CR 0.74 07/29/2019     (H) 07/29/2019    VENUS 8.9 07/29/2019       Preop Vitals  BP Readings from Last 3 Encounters:   12/08/20 125/61   11/18/20 118/74   02/05/20 133/82    Pulse Readings from Last 3 Encounters:   11/18/20 68   02/05/20 92   01/21/20 62      Resp Readings from Last 3 Encounters:   12/08/20 16   02/05/20 14   11/30/17 20    SpO2 Readings from Last 3 Encounters:   12/08/20 100%   02/05/20 99%   07/29/19 97%      Temp Readings from Last 1 Encounters:   12/08/20 36.6  C (97.8  F) (Temporal)    Ht Readings from Last 1 Encounters:   11/18/20 1.803 m (5' 11\")      Wt Readings from Last 1 Encounters:   12/08/20 91.2 kg (201 lb)    Estimated body mass index is 28.03 kg/m  as calculated from the following:    Height as of 11/18/20: 1.803 m (5' 11\").    Weight as of this encounter: 91.2 kg (201 lb).       Anesthesia Plan      History & Physical Review  History and physical reviewed and following examination; no interval change.    ASA Status:  2 .    NPO Status:  > 8 hours    Plan for General with Intravenous and Propofol induction. Maintenance will be Balanced.    PONV prophylaxis:  Ondansetron (or other 5HT-3), Dexamethasone or Solumedrol and Other (See comment) (propofol gtt)         Postoperative Care  Postoperative pain management:  IV analgesics and Oral pain medications.      Consents  Anesthetic plan, risks, benefits and alternatives discussed with:  Patient..                 Jhony Kessler MD  "

## 2020-12-08 NOTE — ANESTHESIA POSTPROCEDURE EVALUATION
Patient: Antoinette Deleon    Procedure(s):  HYSTEROSCOPY, SEPTUM RESECTION, CERVICAL DILATION AND ENDOMETRIAL ABLATION    Diagnosis:DUB (dysfunctional uterine bleeding) [N93.8]  Diagnosis Additional Information: No value filed.    Anesthesia Type:  General    Note:  Anesthesia Post Evaluation    Patient location during evaluation: PACU  Patient participation: Able to fully participate in evaluation  Level of consciousness: awake  Pain management: adequate  Airway patency: patent  Cardiovascular status: acceptable  Respiratory status: acceptable  Hydration status: acceptable  PONV: none     Anesthetic complications: None          Last vitals:  Vitals:    12/08/20 0900 12/08/20 0915 12/08/20 0950   BP: 124/84 123/79 (!) 130/96   Pulse: 80 72 75   Resp: 15 13 13   Temp: 36.3  C (97.3  F) 36  C (96.8  F) 37.6  C (99.7  F)   SpO2: 99% 99% 100%         Electronically Signed By: Jhony Kessler MD  December 8, 2020  10:05 AM

## 2020-12-17 ENCOUNTER — OFFICE VISIT (OUTPATIENT)
Dept: OBGYN | Facility: CLINIC | Age: 50
End: 2020-12-17
Payer: COMMERCIAL

## 2020-12-17 VITALS
WEIGHT: 199 LBS | SYSTOLIC BLOOD PRESSURE: 124 MMHG | BODY MASS INDEX: 27.86 KG/M2 | HEIGHT: 71 IN | HEART RATE: 80 BPM | DIASTOLIC BLOOD PRESSURE: 72 MMHG

## 2020-12-17 DIAGNOSIS — Z09 POSTOP CHECK: Primary | ICD-10-CM

## 2020-12-17 PROCEDURE — 99212 OFFICE O/P EST SF 10 MIN: CPT | Performed by: OBSTETRICS & GYNECOLOGY

## 2020-12-17 ASSESSMENT — MIFFLIN-ST. JEOR: SCORE: 1613.79

## 2020-12-17 NOTE — PROGRESS NOTES
The patient is seen at this time for her postop check.  She had a hysteroscopy with septum resection and ablation of the entire cavity.  She has had no bleeding.  She is doing very well and will monitor for the next 3 months.  If she has any recurrent bleeding we will discuss hysterectomy.

## 2021-01-15 ENCOUNTER — HEALTH MAINTENANCE LETTER (OUTPATIENT)
Age: 51
End: 2021-01-15

## 2021-02-23 DIAGNOSIS — N93.8 DUB (DYSFUNCTIONAL UTERINE BLEEDING): ICD-10-CM

## 2021-02-23 RX ORDER — NORGESTREL-ETHINYL ESTRADIOL 0.3-0.03MG
TABLET ORAL
Qty: 112 TABLET | Refills: 0 | Status: SHIPPED | OUTPATIENT
Start: 2021-02-23 | End: 2021-04-08

## 2021-02-23 NOTE — TELEPHONE ENCOUNTER
"Requested Prescriptions   Pending Prescriptions Disp Refills     norgestrel-ethinyl estradiol (CRYSELLE-28) 0.3-30 MG-MCG tablet [Pharmacy Med Name: CRYSELLE TABLETS 28] 112 tablet 0     Sig: TAKE 1 TABLET BY MOUTH EVERY DAY CONTINUOUSLY AND SKIP PLACEBO TABLETS       Contraceptives Protocol Failed - 2/23/2021  3:58 AM        Failed - Medication is active on med list        Passed - Patient is not a current smoker if age is 35 or older        Passed - Recent (12 mo) or future (30 days) visit within the authorizing provider's specialty     Patient has had an office visit with the authorizing provider or a provider within the authorizing providers department within the previous 12 mos or has a future within next 30 days. See \"Patient Info\" tab in inbasket, or \"Choose Columns\" in Meds & Orders section of the refill encounter.              Passed - No active pregnancy on record        Passed - No positive pregnancy test in past 12 months           Last Written Prescription Date:  12/1/20  Last Fill Quantity: 112,  # refills: 0   Last office visit: 12/17/2020 with prescribing provider:  Dr ruelas   Future Office Visit:      Ok to refill after Hysteroscopy, septum resection 12/8/20?    Monalisa Gill RN on 2/23/2021 at 8:05 AM        "

## 2021-03-21 ENCOUNTER — HEALTH MAINTENANCE LETTER (OUTPATIENT)
Age: 51
End: 2021-03-21

## 2021-03-31 ENCOUNTER — IMMUNIZATION (OUTPATIENT)
Dept: NURSING | Facility: CLINIC | Age: 51
End: 2021-03-31
Payer: COMMERCIAL

## 2021-03-31 PROCEDURE — 91300 PR COVID VAC PFIZER DIL RECON 30 MCG/0.3 ML IM: CPT

## 2021-03-31 PROCEDURE — 0001A PR COVID VAC PFIZER DIL RECON 30 MCG/0.3 ML IM: CPT

## 2021-04-05 NOTE — PROGRESS NOTES
Antoinette is a 50 year old  female who presents for annual exam.     Besides routine health maintenance, she has no other health concerns today .    HPI: The patient is a 50-year-old who is seen at this time for her annual exam.  She underwent an endometrial ablation in 2020.  The patient has had no bleeding.  She has had no menopausal symptoms and she has lost 20 pounds in the last 3 months.  The patient's PCP is  Cindi Cho MD.        GYNECOLOGIC HISTORY:    No LMP recorded. Patient has had an ablation.    Regular menses? No, ablation      Her current contraception method is: none.  She  reports that she has never smoked. She has never used smokeless tobacco.    Patient is sexually active.  STD testing offered?  Declined  Last PHQ-9 score on record =   PHQ-9 SCORE 2021   PHQ-9 Total Score 3     Last GAD7 score on record =   ARACELI-7 SCORE 2021   Total Score 5     Alcohol Score = 0    HEALTH MAINTENANCE:  Cholesterol: 4  Cholesterol   Date Value Ref Range Status   2019 169 <200 mg/dL Final   06/10/2015 135 100 - 200 mg/dL Final      Last Mammo: One year ago, Result: Normal, Next Mammo: Today   Pap:   Lab Results   Component Value Date    PAP NIL 2019    PAP NIL 2018    PAP NIL 2017      Colonoscopy:  never, Result: Not applicable, Next Colonoscopy: will schedule.  Dexa:  never    Health maintenance updated:  yes    HISTORY:  OB History    Para Term  AB Living   5 4 4 0 1 4   SAB TAB Ectopic Multiple Live Births   1 0 0 0 4      # Outcome Date GA Lbr Edwin/2nd Weight Sex Delivery Anes PTL Lv   5 Term            4 Term            3 SAB            2 Term         AVERY   1 Term         AVERY       Patient Active Problem List   Diagnosis     Hypothyroidism     Migraine with aura     Cervical high risk HPV (human papillomavirus) test positive     Dysfunctional uterine bleeding     DUB (dysfunctional uterine bleeding)     Past Surgical History:   Procedure  Laterality Date     AS HYSTEROSCOPY, SURGICAL; W/ ENDOMETRIAL ABLATION, ANY METHOD      Elicia     BLADDER SURGERY  08/2015    [facility] [place]     DILATE CERVIX, HYSTEROSCOPY, ABLATE ENDOMETRIUM, COMBINED N/A 12/8/2020    Procedure: HYSTEROSCOPY, SEPTUM RESECTION, CERVICAL DILATION AND ENDOMETRIAL ABLATION;  Surgeon: Larry Vásquez MD;  Location:  OR     DILATION AND CURETTAGE, OPERATIVE HYSTEROSCOPY, COMBINED N/A 2/5/2020    Procedure: HYSTEROSCOPY,  DILATION AND CURETTAGE;  Surgeon: Yon Townsend MD;  Location: [facility] OR     GYN SURGERY      ablation, tubal      SLING BLADDER SUSPENSION WITH FASCIA GREGG        Social History     Tobacco Use     Smoking status: Never Smoker     Smokeless tobacco: Never Used   Substance Use Topics     Alcohol use: Yes     Alcohol/week: 0.0 standard drinks     Comment: social      Problem (# of Occurrences) Relation (Name,Age of Onset)    Diabetes Type 2  (1) Father    Hypertension (1) Father    No Known Problems (7) Mother, Sister, Brother, Maternal Grandmother, Maternal Grandfather, Paternal Grandmother, Other            Current Outpatient Medications   Medication Sig     cholecalciferol (VITAMIN D) 1000 UNIT tablet Take 1,000 Units by mouth daily      liothyronine (CYTOMEL) 5 MCG tablet Take 10 mcg by mouth daily (2 X 5MCG)     multivitamin, therapeutic with minerals (MULTI-VITAMIN) TABS Take 1 tablet by mouth daily      ondansetron (ZOFRAN) 4 MG tablet Take 4 mg by mouth every 8 hours as needed      SUMAtriptan (IMITREX) 25 MG tablet TAKE 1 TO 2 TABLETS (25-50MG) BY MOUTH AT ONSET OF HEADACHE FOR MIGRAINE. MAY REPEAT IN 2 HOURS. MAX 8 TABLETS IN 24 HOURS     SYNTHROID 137 MCG tablet Take 137 mcg by mouth daily      No current facility-administered medications for this visit.      No Known Allergies    Past medical, surgical, social and family histories were reviewed and updated in EPIC.    ROS:   12 point review of systems negative other than symptoms noted below or in the  "HPI.  No urinary frequency or dysuria, bladder or kidney problems    EXAM:  /84   Pulse 72   Ht 1.803 m (5' 11\")   Wt 85.3 kg (188 lb)   BMI 26.22 kg/m     BMI: Body mass index is 26.22 kg/m .    PHYSICAL EXAM:  Constitutional:   Appearance: Well nourished, well developed, alert, in no acute distress  Neck:  Lymph Nodes:  No lymphadenopathy present    Thyroid:  Gland size normal, nontender, no nodules or masses present  on palpation  Chest:  Respiratory Effort:  Breathing unlabored  Cardiovascular:    Heart: Auscultation:  Regular rate, normal rhythm, no murmurs present  Breasts: Inspection of Breasts:  No lymphadenopathy present., Palpation of Breasts and Axillae:  No masses present on palpation, no breast tenderness., Axillary Lymph Nodes:  No lymphadenopathy present. and No nodularity, asymmetry or nipple discharge bilaterally.  Gastrointestinal:   Abdominal Examination:  Abdomen nontender to palpation, tone normal without rigidity or guarding, no masses present, umbilicus without lesions   Liver and Spleen:  No hepatomegaly present, liver nontender to palpation    Hernias:  No hernias present  Lymphatic: Lymph Nodes:  No other lymphadenopathy present  Skin:  General Inspection:  No rashes present, no lesions present, no areas of  discoloration  Neurologic:    Mental Status:  Oriented X3.  Normal strength and tone, sensory exam                grossly normal, mentation intact and speech normal.    Psychiatric:   Mentation appears normal and affect normal/bright.         Pelvic Exam:  External Genitalia:     Normal appearance for age, no discharge present, no tenderness present, no inflammatory lesions present, color normal  Vagina:     Normal vaginal vault without central or paravaginal defects, no discharge present, no inflammatory lesions present, no masses present  Bladder:     Nontender to palpation  Urethra:   Urethral Body:  Urethra palpation normal, urethra structural support normal   Urethral " Meatus:  No erythema or lesions present  Cervix:     Appearance healthy, no lesions present, nontender to palpation, no bleeding present  Uterus:     Uterus: firm, normal sized and nontender, midplane in position.   Adnexa:     No adnexal tenderness present, no adnexal masses present  Perineum:     Perineum within normal limits, no evidence of trauma, no rashes or skin lesions present  Anus:     Anus within normal limits, no hemorrhoids present  Inguinal Lymph Nodes:     No lymphadenopathy present  Pubic Hair:     Normal pubic hair distribution for age  Genitalia and Groin:     No rashes present, no lesions present, no areas of discoloration, no masses present      COUNSELING:   Reviewed preventive health counseling, as reflected in patient instructions       Regular exercise       Healthy diet/nutrition    BMI: Body mass index is 26.22 kg/m .      ASSESSMENT:  50 year old female with satisfactory annual exam.    ICD-10-CM    1. Screening for cervical cancer  Z12.4 HPV High Risk Types DNA Cervical     Pap imaged thin layer screen with HPV - recommended age 30 - 65 years (select HPV order below)       PLAN: We will convey the patient's Pap and mammogram results when available.      Larry Vásquez MD

## 2021-04-08 ENCOUNTER — OFFICE VISIT (OUTPATIENT)
Dept: OBGYN | Facility: CLINIC | Age: 51
End: 2021-04-08
Payer: COMMERCIAL

## 2021-04-08 VITALS
WEIGHT: 188 LBS | BODY MASS INDEX: 26.32 KG/M2 | DIASTOLIC BLOOD PRESSURE: 84 MMHG | SYSTOLIC BLOOD PRESSURE: 138 MMHG | HEART RATE: 72 BPM | HEIGHT: 71 IN

## 2021-04-08 DIAGNOSIS — Z12.4 SCREENING FOR CERVICAL CANCER: Primary | ICD-10-CM

## 2021-04-08 DIAGNOSIS — Z12.31 VISIT FOR SCREENING MAMMOGRAM: ICD-10-CM

## 2021-04-08 PROCEDURE — 87624 HPV HI-RISK TYP POOLED RSLT: CPT | Performed by: OBSTETRICS & GYNECOLOGY

## 2021-04-08 PROCEDURE — 99396 PREV VISIT EST AGE 40-64: CPT | Performed by: OBSTETRICS & GYNECOLOGY

## 2021-04-08 PROCEDURE — 77067 SCR MAMMO BI INCL CAD: CPT | Mod: TC | Performed by: RADIOLOGY

## 2021-04-08 PROCEDURE — G0145 SCR C/V CYTO,THINLAYER,RESCR: HCPCS | Performed by: OBSTETRICS & GYNECOLOGY

## 2021-04-08 ASSESSMENT — PATIENT HEALTH QUESTIONNAIRE - PHQ9
SUM OF ALL RESPONSES TO PHQ QUESTIONS 1-9: 3
5. POOR APPETITE OR OVEREATING: SEVERAL DAYS

## 2021-04-08 ASSESSMENT — ANXIETY QUESTIONNAIRES
IF YOU CHECKED OFF ANY PROBLEMS ON THIS QUESTIONNAIRE, HOW DIFFICULT HAVE THESE PROBLEMS MADE IT FOR YOU TO DO YOUR WORK, TAKE CARE OF THINGS AT HOME, OR GET ALONG WITH OTHER PEOPLE: SOMEWHAT DIFFICULT
1. FEELING NERVOUS, ANXIOUS, OR ON EDGE: SEVERAL DAYS
6. BECOMING EASILY ANNOYED OR IRRITABLE: NOT AT ALL
5. BEING SO RESTLESS THAT IT IS HARD TO SIT STILL: NOT AT ALL
GAD7 TOTAL SCORE: 5
2. NOT BEING ABLE TO STOP OR CONTROL WORRYING: SEVERAL DAYS
3. WORRYING TOO MUCH ABOUT DIFFERENT THINGS: SEVERAL DAYS
7. FEELING AFRAID AS IF SOMETHING AWFUL MIGHT HAPPEN: SEVERAL DAYS

## 2021-04-08 ASSESSMENT — MIFFLIN-ST. JEOR: SCORE: 1563.89

## 2021-04-09 ASSESSMENT — ANXIETY QUESTIONNAIRES: GAD7 TOTAL SCORE: 5

## 2021-04-12 LAB
COPATH REPORT: NORMAL
PAP: NORMAL

## 2021-04-21 ENCOUNTER — IMMUNIZATION (OUTPATIENT)
Dept: NURSING | Facility: CLINIC | Age: 51
End: 2021-04-21
Attending: INTERNAL MEDICINE
Payer: COMMERCIAL

## 2021-04-21 PROCEDURE — 0002A PR COVID VAC PFIZER DIL RECON 30 MCG/0.3 ML IM: CPT

## 2021-04-21 PROCEDURE — 91300 PR COVID VAC PFIZER DIL RECON 30 MCG/0.3 ML IM: CPT

## 2021-04-27 ENCOUNTER — PATIENT OUTREACH (OUTPATIENT)
Dept: OBGYN | Facility: CLINIC | Age: 51
End: 2021-04-27

## 2021-05-15 DIAGNOSIS — N93.8 DUB (DYSFUNCTIONAL UTERINE BLEEDING): ICD-10-CM

## 2021-05-17 RX ORDER — NORGESTREL-ETHINYL ESTRADIOL 0.3-0.03MG
TABLET ORAL
Qty: 112 TABLET | Refills: 0 | OUTPATIENT
Start: 2021-05-17

## 2021-05-17 NOTE — TELEPHONE ENCOUNTER
"Requested Prescriptions   Pending Prescriptions Disp Refills     CRYSELLE-28 0.3-30 MG-MCG tablet [Pharmacy Med Name: CRYSELLE TABLETS 28] 112 tablet 0     Sig: TAKE 1 TABLET BY MOUTH EVERY DAY CONTINUOUSLY AND SKIP PLACEBO TABLETS       Contraceptives Protocol Failed - 5/15/2021  3:58 AM        Failed - Medication is active on med list        Passed - Patient is not a current smoker if age is 35 or older        Passed - Recent (12 mo) or future (30 days) visit within the authorizing provider's specialty     Patient has had an office visit with the authorizing provider or a provider within the authorizing providers department within the previous 12 mos or has a future within next 30 days. See \"Patient Info\" tab in inbasket, or \"Choose Columns\" in Meds & Orders section of the refill encounter.              Passed - No active pregnancy on record        Passed - No positive pregnancy test in past 12 months           NOT ON CURRENT MED LIST  Last office visit: 4/8/2021 with prescribing provider:  Thalia   Future Office Visit:   Next 5 appointments (look out 90 days)    May 18, 2021  9:20 AM  Ankit Physical Adult with Cindi Cho MD  [facility] ([facility] ) [address] [place] [id number] [phone]                 "

## 2021-05-17 NOTE — TELEPHONE ENCOUNTER
Refused as med discontinued  endometrial ablation in December 2020    Monalisa Gill RN on 5/17/2021 at 8:08 AM

## 2021-05-18 ENCOUNTER — OFFICE VISIT (OUTPATIENT)
Dept: FAMILY MEDICINE | Facility: CLINIC | Age: 51
End: 2021-05-18
Payer: COMMERCIAL

## 2021-05-18 VITALS
SYSTOLIC BLOOD PRESSURE: 104 MMHG | OXYGEN SATURATION: 100 % | WEIGHT: 187 LBS | HEIGHT: 71 IN | DIASTOLIC BLOOD PRESSURE: 72 MMHG | HEART RATE: 71 BPM | BODY MASS INDEX: 26.18 KG/M2 | TEMPERATURE: 97.5 F

## 2021-05-18 DIAGNOSIS — Z00.00 ROUTINE GENERAL MEDICAL EXAMINATION AT A HEALTH CARE FACILITY: Primary | ICD-10-CM

## 2021-05-18 DIAGNOSIS — G43.109 MIGRAINE WITH AURA AND WITHOUT STATUS MIGRAINOSUS, NOT INTRACTABLE: ICD-10-CM

## 2021-05-18 DIAGNOSIS — E03.9 HYPOTHYROIDISM, UNSPECIFIED TYPE: ICD-10-CM

## 2021-05-18 DIAGNOSIS — Q51.3 BICORNATE UTERUS: ICD-10-CM

## 2021-05-18 DIAGNOSIS — N93.8 DUB (DYSFUNCTIONAL UTERINE BLEEDING): ICD-10-CM

## 2021-05-18 DIAGNOSIS — Z12.11 SCREEN FOR COLON CANCER: ICD-10-CM

## 2021-05-18 PROCEDURE — 99396 PREV VISIT EST AGE 40-64: CPT | Performed by: INTERNAL MEDICINE

## 2021-05-18 RX ORDER — ONDANSETRON 4 MG/1
4 TABLET, FILM COATED ORAL EVERY 8 HOURS PRN
Qty: 30 TABLET | Refills: 0 | Status: SHIPPED | OUTPATIENT
Start: 2021-05-18 | End: 2021-06-24

## 2021-05-18 RX ORDER — SUMATRIPTAN 25 MG/1
TABLET, FILM COATED ORAL
Qty: 18 TABLET | Refills: 0 | Status: SHIPPED | OUTPATIENT
Start: 2021-05-18 | End: 2022-02-09

## 2021-05-18 ASSESSMENT — PAIN SCALES - GENERAL: PAINLEVEL: NO PAIN (0)

## 2021-05-18 ASSESSMENT — MIFFLIN-ST. JEOR: SCORE: 1559.36

## 2021-05-18 NOTE — PROGRESS NOTES
SUBJECTIVE:   CC: Antionette Deleon is an 50 year old woman who presents for preventive health visit.       Patient has been advised of split billing requirements and indicates understanding: Yes  Healthy Habits:    Do you get at least three servings of calcium containing foods daily (dairy, green leafy vegetables, etc.)? yes    Amount of exercise or daily activities, outside of work: 7 day(s) per week    Problems taking medications regularly No    Medication side effects: No    Have you had an eye exam in the past two years? no    Do you see a dentist twice per year? yes    Do you have sleep apnea, excessive snoring or daytime drowsiness?no      Antoinette has been struggling with abnormal vaginal bleeding for the past couple of years. She underwent an ablation which did not work - she continued to have light spotting. Went back and saw someone else who told her she had a bicornate uterus; went in and had another ablation and has been doing great since that time. No more bleeding, lots more energy, eating better and has lost almost 40 lbs! She also recently got a dog (94 lb [employer]/lab mix) who has been keeping her active.     Daughters are really struggling with anxiety (8th grade and sophomore in high school). Antoinette works from home so can do a lot of listening. Her kids went back to in-person school in January.     Her migraines have improved significantly. She takes Imitrex and Zofran as needed with significant benefit, now only having about 1-2 migraines per year. She associates her migraines with stress, which is mostly related to her 76 year old mother's mental health struggles. Her mother is living in [place] and was found outside in the cold over the winter; since then there has been more attention to her case and she will be receiving more formal evaluation which Antoinette is happy about. Of note, her mother is a clinical psychologist so previously has been able to perform very well on  assessments.     She also mentions recent hair loss and wonders if this could be related to improper dosing of her Synthroid (follows with Endocrinology, next visit in June/July) versus stress. Given her recent weight loss she wonders whether a dose change is needed.     She is due for and agreeable to completing a colonoscopy and receiving her Shingles vaccine. Mammogram and pap smear are up to date.         Today's PHQ-2 Score:   PHQ-2 ( 1999 [employer]) 5/18/2021 12/17/2020   Q1: Little interest or pleasure in doing things 0 -   Q2: Feeling down, depressed or hopeless 0 0   PHQ-2 Score 0 -   Q1: Little interest or pleasure in doing things - -   Q2: Feeling down, depressed or hopeless - -   PHQ-2 Score - -       Abuse: Current or Past(Physical, Sexual or Emotional)- No  Do you feel safe in your environment? Yes    Have you ever done Advance Care Planning? (For example, a Health Directive, POLST, or a discussion with a medical provider or your loved ones about your wishes): Yes, advance care planning is on file.    Social History     Tobacco Use     Smoking status: Never Smoker     Smokeless tobacco: Never Used   Substance Use Topics     Alcohol use: Yes     Alcohol/week: 0.0 standard drinks     Comment: social     If you drink alcohol do you typically have >3 drinks per day or >7 drinks per week? No                     Reviewed orders with patient.  Reviewed health maintenance and updated orders accordingly - Yes      FSH-7: No flowsheet data found.    Mammogram Screening: Recommended annual mammography  Pertinent mammograms are reviewed under the imaging tab.    Pertinent mammograms are reviewed under the imaging tab.  History of abnormal Pap smear: NO - age 30-65 PAP every 5 years with negative HPV co-testing recommended  PAP / HPV Latest Ref Rng & Units 4/8/2021 12/3/2019 11/28/2018   PAP - NIL NIL NIL   HPV 16 DNA NEG:Negative Negative Negative Negative   HPV 18 DNA NEG:Negative Negative Negative Negative   OTHER  "HR HPV NEG:Negative Positive(A) Negative Positive(A)     Reviewed and updated as needed this visit by clinical staff  Tobacco  Allergies  Meds              Reviewed and updated as needed this visit by Provider                    ROS:  CONSTITUTIONAL: NEGATIVE for fever, chills, change in weight  INTEGUMENTARY/SKIN: NEGATIVE for worrisome rashes, moles or lesions  EYES: NEGATIVE for vision changes or irritation  ENT: NEGATIVE for ear, mouth and throat problems  RESP: NEGATIVE for significant cough or SOB  BREAST: NEGATIVE for masses, tenderness or discharge  CV: NEGATIVE for chest pain, palpitations or peripheral edema  GI: NEGATIVE for nausea, abdominal pain, heartburn, or change in bowel habits  : NEGATIVE for unusual urinary or vaginal symptoms. No vaginal bleeding.  MUSCULOSKELETAL: NEGATIVE for significant arthralgias or myalgia  NEURO: NEGATIVE for weakness, dizziness or paresthesias  PSYCHIATRIC: NEGATIVE for changes in mood or affect     OBJECTIVE:   /72   Pulse 71   Temp 97.5  F (36.4  C) (Tympanic)   Ht 1.803 m (5' 11\")   Wt 84.8 kg (187 lb)   SpO2 100%   BMI 26.08 kg/m    EXAM:  GENERAL: healthy, alert and no distress  EYES: Eyes grossly normal to inspection, PERRL and conjunctivae and sclerae normal  HENT: ear canals and TM's normal, nose and mouth without ulcers or lesions  NECK: no adenopathy, no asymmetry, masses, or scars and thyroid normal to palpation  RESP: lungs clear to auscultation - no rales, rhonchi or wheezes  CV: regular rate and rhythm, normal S1 S2, no S3 or S4, no murmur, click or rub, no peripheral edema and peripheral pulses strong  ABDOMEN: soft, nontender, no hepatosplenomegaly, no masses and bowel sounds normal  MS: no gross musculoskeletal defects noted, no edema  SKIN: no suspicious lesions or rashes  NEURO: Normal strength and tone, mentation intact and speech normal  PSYCH: mentation appears normal, affect normal/bright    Diagnostic Test Results:  Labs reviewed " in Epic    ASSESSMENT/PLAN:     1. Routine general medical examination at a health care facility  Antoinette is doing very well with recent 40 lb weight loss, improved diet, and physical activity. Her energy levels have improved significantly and she maintains a bright affect. Mammogram and pap smear are up to date. Discussed Shingles vaccination today and she will check with her insurance about where to receive this. Starting colon cancer screening as noted below. Lipid studies last checked in 2019 and within normal range - given this and weight loss there is no indication for lipid studies at this time.   - Schedule Shingles vaccine nurse visit or with local pharmacy     2. Migraine with aura and without status migrainosus, not intractable  Significantly improved with reduction in stress, now only having 1-2 migraines per year which completely resolve with Imitrex and Zofran. Refilling today.   - SUMAtriptan (IMITREX) 25 MG tablet; TAKE 1 TO 2 TABLETS (25-50MG) BY MOUTH AT ONSET OF HEADACHE FOR MIGRAINE. MAY REPEAT IN 2 HOURS. MAX 8 TABLETS IN 24 HOURS  Dispense: 18 tablet; Refill: 0  - ondansetron (ZOFRAN) 4 MG tablet; Take 1 tablet (4 mg) by mouth every 8 hours as needed for nausea (migraines)  Dispense: 30 tablet; Refill: 0    3. Hypothyroidism, unspecified type  Following with Endocrinology, on Synthroid. She has some concerns today regarding hair loss and wonders if alternative Synthroid dosing is indicated with recent 40 lb weight loss. Has follow up with Endocrinology in 1-2 months.     4. DUB (dysfunctional uterine bleeding)  Underwent repeat hysteroscopy and reablation in December 2020 due to previous incomplete endometrial ablation (related to bicornate uterus) and ongoing spotting. Since December has had no more bleeding which she is very happy with. She has also observed significant improvement in energy levels over this time period. Continue to monitor.     5. Bicornate uterus  History of bicornate  "uterus with dysfunctional uterine bleeding, now resolved since reablation in 12/2020. See above for further discussion.     6. Screen for colon cancer  Due for colon cancer screening; will proceed with colonoscopy referral. No family history of colon cancer.   - GASTROENTEROLOGY ADULT REF PROCEDURE ONLY; Future      Patient has been advised of split billing requirements and indicates understanding: Yes  COUNSELING:   Reviewed preventive health counseling, as reflected in patient instructions    Estimated body mass index is 26.08 kg/m  as calculated from the following:    Height as of this encounter: 1.803 m (5' 11\").    Weight as of this encounter: 84.8 kg (187 lb).        She reports that she has never smoked. She has never used smokeless tobacco.      Counseling Resources:  ATP IV Guidelines  Pooled Cohorts Equation Calculator  Breast Cancer Risk Calculator  BRCA-Related Cancer Risk Assessment: FHS-7 Tool  FRAX Risk Assessment  ICSI Preventive Guidelines  Dietary Guidelines for Americans, 2010  USDA's MyPlate  ASA Prophylaxis  Lung CA Screening    Cindi Cho MD  [facility]  "

## 2021-06-20 NOTE — LETTER
Letter by Tierra Pierson at      Author: Tierra Pierson Service: -- Author Type: --    Filed:  Encounter Date: 2020 Status: (Other)         2020       Antoinette Deleon  [address]  [place] [address]    Dear Antoinette Sancheznighat:    We are pleased to provide you with secure, online access to medical information for you and your family. Per your request, we have expanded your account to allow access to the records of the following family members:              Sukh Deleon (privilege ends on 2020.)     How Do I Login?  1. In your Internet browser, go to [email or web address].  2. Click on Sign Up Now   3. Enter your [employer] Activation Code exactly as it appears below. This code will  60 days after it is generated. You will not need to use this code after you have completed the sign-up process. If you do not sign up before the expiration date, you must request a new code.     [employer] Activation Code: [email or web address]  Expires: 10/6/2020  9:52 AM    4. Enter in your date of birth and zip code.  5. Create a [employer] username. Think of one that is secure and easy to remember.  Your username must be between 6 and 20 characters.  6. Create a [employer] password. You can change your password at any time. Your password must be between 8 and 45 characters, contain at least two letters and one number, and contain both upper and lower case letters.  7. Choose a security question, enter your answer, and click Next. This can be used to access [employer] if you forget your password.   8. Enter a valid e-mail address to receive e-mail notifications when new information is available in [employer].  9. Click Sign In.        How Do I Access a Family Member's Account?  10. Select the account you want to access by clicking the Ramona with the appropriate patient's name at the top of your screen.   11. You will see a disclaimer page letting you know that you will be viewing a  family member's record. Review the disclaimer and then click Accept Proxy Access Disclaimer to proceed.  12. Once you switch to viewing a family member's record, you can navigate to [employer] pages the same way you would for yourself. You can return to your own account by clicking the [place] at the top of the screen with your name on it.    13. To customize colors and names of the linked accounts, you can select Personalize from the Profile dropdown menu at the top of the screen, then click the Edit button to make changes.      Additional Information  If you have questions, you can e-mail [email or web address] or call [phone] to talk to our [facility] [employer] staff. Remember, [employer] is NOT to be used for urgent needs. For medical emergencies, dial 911.

## 2021-06-22 DIAGNOSIS — G43.109 MIGRAINE WITH AURA AND WITHOUT STATUS MIGRAINOSUS, NOT INTRACTABLE: ICD-10-CM

## 2021-06-23 NOTE — TELEPHONE ENCOUNTER
Unable to reach the patient on cell phone. Got busy signal.  No answer or VM at home number.  Attempted to reach patient to see if she needed refill of medication that was only originally ordered for #30 with no refills.  Melissa Ch RN

## 2021-06-24 RX ORDER — ONDANSETRON 4 MG/1
TABLET, FILM COATED ORAL
Qty: 30 TABLET | Refills: 0 | Status: SHIPPED | OUTPATIENT
Start: 2021-06-24 | End: 2021-09-16

## 2021-06-24 NOTE — TELEPHONE ENCOUNTER
Prescription approved per [facility] Refill Protocol.  Marita Colvin RN

## 2021-06-24 NOTE — TELEPHONE ENCOUNTER
Called and spoke to patient; she states that she would like a refill of her zofran; she takes it with her imitrex for migraines.     Marita Colvin RN

## 2021-08-15 ENCOUNTER — MYC MEDICAL ADVICE (OUTPATIENT)
Dept: FAMILY MEDICINE | Facility: CLINIC | Age: 51
End: 2021-08-15

## 2021-08-15 DIAGNOSIS — I83.899 SYMPTOMATIC SPIDER VARICOSE VEIN: Primary | ICD-10-CM

## 2021-08-17 NOTE — TELEPHONE ENCOUNTER
Sent pt [employer] message with contact information for Vein clinic referral.     Marita Colvin RN

## 2021-08-17 NOTE — TELEPHONE ENCOUNTER
Vein clinic referral ordered    Chano Kaufman MD  [facility], [place]

## 2021-08-18 ENCOUNTER — TELEPHONE (OUTPATIENT)
Dept: GASTROENTEROLOGY | Facility: CLINIC | Age: 51
End: 2021-08-18

## 2021-08-18 NOTE — TELEPHONE ENCOUNTER
Screening Questions  1. Are you active on mychart? YES     2. What insurance is in the chart? [employer]     2.  Ordering/Referring Provider: Dr. Cho     3. BMI 25.1    4. Are you on daily home oxygen? NP    5. Do you have a history of difficult airway? NO    6. Have you had a heart, lung, or liver transplant? NO    7. Are you currently on dialysis? NO    8. Have you had a stroke or Transient ischemic atttack (TIA) within 6 months? NO    9. In the past 6 months, have you had any heart related issues including cardiomyopathy or heart attack?         If yes, did it require cardiac stenting or other implantable device?NO    10. Do you have any implantable devices in your body (pacemaker, defib, LVAD)? NO    11. Do you take nitroglycerin? If yes, how often? NO    12. Are you currently taking any blood thinners?NO    13. Are you a diabetic? NO    14. (Females) Are you currently pregnant? NO  If yes, how many weeks?    15. Have you had a procedure in the past that was difficult to tolerate with conscious sedation? Any allergies to Fentanyl or Versed NO    16. Are you taking any scheduled prescription narcotics more than once daily? NO    17. Do you have any chemical dependencies such as alcohol, street drugs, or methadone? NO    18. Do you have any history of post-traumatic stress syndrome or mental health issues? NO    19. Do you transfer independently? YES    20.  Do you have any issues with constipation? NO    21. Preferred Pharmacy for Pre Prescription [facility] on chart     Scheduling Details    Which Colonoscopy Prep was Sent?: Miralax-Mychart   Procedure Scheduled: Colonoscopy   Provider/Surgeon: Dr. Oliva  Date of Procedure: 09/17/2021  Location: [facility]   Caller (Please ask for phone number if not scheduled by patient): Antoinette Deleon      Sedation Type: CS  Conscious Sedation- Needs  for 6 hours after the procedure  MAC/General-Needs  for 24 hours after procedure    [facility] for MAC sedation:   (if yes advise patient they will need a pre-op prior to procedure)      Is patient on blood thinners? -NO (If yes- inform patient to follow up with PCP or provider for follow up instructions)     Informed patient they will need an adult  YES  Cannot take any type of public or medical transportation alone    Informed Patient of COVID Test Requirement YES    Confirmed Nurse will call to complete assessment YES    Additional comments: NO

## 2021-08-22 DIAGNOSIS — Z11.59 ENCOUNTER FOR SCREENING FOR OTHER VIRAL DISEASES: ICD-10-CM

## 2021-09-04 ENCOUNTER — HEALTH MAINTENANCE LETTER (OUTPATIENT)
Age: 51
End: 2021-09-04

## 2021-09-14 ENCOUNTER — LAB (OUTPATIENT)
Dept: URGENT CARE | Facility: URGENT CARE | Age: 51
End: 2021-09-14
Payer: COMMERCIAL

## 2021-09-14 DIAGNOSIS — Z11.59 ENCOUNTER FOR SCREENING FOR OTHER VIRAL DISEASES: ICD-10-CM

## 2021-09-14 PROCEDURE — U0003 INFECTIOUS AGENT DETECTION BY NUCLEIC ACID (DNA OR RNA); SEVERE ACUTE RESPIRATORY SYNDROME CORONAVIRUS 2 (SARS-COV-2) (CORONAVIRUS DISEASE [COVID-19]), AMPLIFIED PROBE TECHNIQUE, MAKING USE OF HIGH THROUGHPUT TECHNOLOGIES AS DESCRIBED BY CMS-2020-01-R: HCPCS

## 2021-09-14 PROCEDURE — U0005 INFEC AGEN DETEC AMPLI PROBE: HCPCS

## 2021-09-15 LAB — SARS-COV-2 RNA RESP QL NAA+PROBE: NEGATIVE

## 2021-09-16 DIAGNOSIS — G43.109 MIGRAINE WITH AURA AND WITHOUT STATUS MIGRAINOSUS, NOT INTRACTABLE: ICD-10-CM

## 2021-09-16 RX ORDER — ONDANSETRON 4 MG/1
TABLET, FILM COATED ORAL
Qty: 30 TABLET | Refills: 4 | Status: SHIPPED | OUTPATIENT
Start: 2021-09-16 | End: 2022-10-31

## 2021-09-17 ENCOUNTER — HOSPITAL ENCOUNTER (OUTPATIENT)
Facility: CLINIC | Age: 51
Discharge: HOME | End: 2021-09-17
Attending: INTERNAL MEDICINE | Admitting: INTERNAL MEDICINE
Payer: COMMERCIAL

## 2021-09-17 VITALS
HEART RATE: 76 BPM | TEMPERATURE: 97.4 F | OXYGEN SATURATION: 100 % | DIASTOLIC BLOOD PRESSURE: 67 MMHG | HEIGHT: 71 IN | SYSTOLIC BLOOD PRESSURE: 103 MMHG | BODY MASS INDEX: 25.2 KG/M2 | WEIGHT: 180 LBS | RESPIRATION RATE: 13 BRPM

## 2021-09-17 LAB — COLONOSCOPY: NORMAL

## 2021-09-17 PROCEDURE — G0121 COLON CA SCRN NOT HI RSK IND: HCPCS | Performed by: INTERNAL MEDICINE

## 2021-09-17 PROCEDURE — 45378 DIAGNOSTIC COLONOSCOPY: CPT | Performed by: INTERNAL MEDICINE

## 2021-09-17 PROCEDURE — 258N000003 HC RX IP 258 OP 636: Performed by: INTERNAL MEDICINE

## 2021-09-17 PROCEDURE — G0500 MOD SEDAT ENDO SERVICE >5YRS: HCPCS | Performed by: INTERNAL MEDICINE

## 2021-09-17 PROCEDURE — 250N000011 HC RX IP 250 OP 636: Performed by: INTERNAL MEDICINE

## 2021-09-17 RX ORDER — FENTANYL CITRATE 50 UG/ML
50-100 INJECTION, SOLUTION INTRAMUSCULAR; INTRAVENOUS
Status: DISCONTINUED | OUTPATIENT
Start: 2021-09-17 | End: 2021-09-17 | Stop reason: HOSPADM

## 2021-09-17 RX ORDER — LIDOCAINE 40 MG/G
CREAM TOPICAL
Status: DISCONTINUED | OUTPATIENT
Start: 2021-09-17 | End: 2021-09-17 | Stop reason: HOSPADM

## 2021-09-17 RX ORDER — EPINEPHRINE 1 MG/ML
0.1 INJECTION, SOLUTION, CONCENTRATE INTRAVENOUS
Status: DISCONTINUED | OUTPATIENT
Start: 2021-09-17 | End: 2021-09-17 | Stop reason: HOSPADM

## 2021-09-17 RX ORDER — FLUMAZENIL 0.1 MG/ML
0.2 INJECTION, SOLUTION INTRAVENOUS
Status: DISCONTINUED | OUTPATIENT
Start: 2021-09-17 | End: 2021-09-17 | Stop reason: HOSPADM

## 2021-09-17 RX ORDER — SIMETHICONE 40MG/0.6ML
133 SUSPENSION, DROPS(FINAL DOSAGE FORM)(ML) ORAL
Status: DISCONTINUED | OUTPATIENT
Start: 2021-09-17 | End: 2021-09-17 | Stop reason: HOSPADM

## 2021-09-17 RX ORDER — NALOXONE HYDROCHLORIDE 0.4 MG/ML
0.2 INJECTION, SOLUTION INTRAMUSCULAR; INTRAVENOUS; SUBCUTANEOUS
Status: DISCONTINUED | OUTPATIENT
Start: 2021-09-17 | End: 2021-09-17 | Stop reason: HOSPADM

## 2021-09-17 RX ORDER — NALOXONE HYDROCHLORIDE 0.4 MG/ML
0.4 INJECTION, SOLUTION INTRAMUSCULAR; INTRAVENOUS; SUBCUTANEOUS
Status: DISCONTINUED | OUTPATIENT
Start: 2021-09-17 | End: 2021-09-17 | Stop reason: HOSPADM

## 2021-09-17 RX ORDER — DIPHENHYDRAMINE HCL 25 MG
25 CAPSULE ORAL EVERY 6 HOURS PRN
COMMUNITY
End: 2022-06-13

## 2021-09-17 RX ORDER — ATROPINE SULFATE 0.4 MG/ML
0.4 AMPUL (ML) INJECTION
Status: DISCONTINUED | OUTPATIENT
Start: 2021-09-17 | End: 2021-09-17 | Stop reason: HOSPADM

## 2021-09-17 RX ORDER — ONDANSETRON 2 MG/ML
4 INJECTION INTRAMUSCULAR; INTRAVENOUS EVERY 6 HOURS PRN
Status: DISCONTINUED | OUTPATIENT
Start: 2021-09-17 | End: 2021-09-17 | Stop reason: HOSPADM

## 2021-09-17 RX ORDER — FENTANYL CITRATE 50 UG/ML
INJECTION, SOLUTION INTRAMUSCULAR; INTRAVENOUS PRN
Status: COMPLETED | OUTPATIENT
Start: 2021-09-17 | End: 2021-09-17

## 2021-09-17 RX ORDER — PROCHLORPERAZINE MALEATE 10 MG
10 TABLET ORAL EVERY 6 HOURS PRN
Status: DISCONTINUED | OUTPATIENT
Start: 2021-09-17 | End: 2021-09-17 | Stop reason: HOSPADM

## 2021-09-17 RX ORDER — ONDANSETRON 2 MG/ML
4 INJECTION INTRAMUSCULAR; INTRAVENOUS
Status: DISCONTINUED | OUTPATIENT
Start: 2021-09-17 | End: 2021-09-17 | Stop reason: HOSPADM

## 2021-09-17 RX ORDER — SODIUM CHLORIDE 9 MG/ML
INJECTION, SOLUTION INTRAVENOUS CONTINUOUS PRN
Status: COMPLETED | OUTPATIENT
Start: 2021-09-17 | End: 2021-09-17

## 2021-09-17 RX ORDER — ONDANSETRON 2 MG/ML
INJECTION INTRAMUSCULAR; INTRAVENOUS PRN
Status: COMPLETED | OUTPATIENT
Start: 2021-09-17 | End: 2021-09-17

## 2021-09-17 RX ORDER — FENTANYL CITRATE 50 UG/ML
25-50 INJECTION, SOLUTION INTRAMUSCULAR; INTRAVENOUS
Status: DISCONTINUED | OUTPATIENT
Start: 2021-09-17 | End: 2021-09-17 | Stop reason: HOSPADM

## 2021-09-17 RX ORDER — ONDANSETRON 4 MG/1
4 TABLET, ORALLY DISINTEGRATING ORAL EVERY 6 HOURS PRN
Status: DISCONTINUED | OUTPATIENT
Start: 2021-09-17 | End: 2021-09-17 | Stop reason: HOSPADM

## 2021-09-17 RX ADMIN — FENTANYL CITRATE 100 MCG: 50 INJECTION, SOLUTION INTRAMUSCULAR; INTRAVENOUS at 09:41

## 2021-09-17 RX ADMIN — MIDAZOLAM 2 MG: 1 INJECTION INTRAMUSCULAR; INTRAVENOUS at 09:41

## 2021-09-17 RX ADMIN — ONDANSETRON 4 MG: 2 INJECTION INTRAMUSCULAR; INTRAVENOUS at 09:00

## 2021-09-17 RX ADMIN — SODIUM CHLORIDE 125 ML/HR: 9 INJECTION, SOLUTION INTRAVENOUS at 09:00

## 2021-09-17 ASSESSMENT — MIFFLIN-ST. JEOR: SCORE: 1527.6

## 2021-10-07 ENCOUNTER — OFFICE VISIT (OUTPATIENT)
Dept: VASCULAR SURGERY | Facility: CLINIC | Age: 51
End: 2021-10-07
Attending: FAMILY MEDICINE
Payer: COMMERCIAL

## 2021-10-07 DIAGNOSIS — I83.899 SYMPTOMATIC SPIDER VARICOSE VEIN: ICD-10-CM

## 2021-10-07 DIAGNOSIS — I83.891 SYMPTOMATIC VARICOSE VEINS OF RIGHT LOWER EXTREMITY: Primary | ICD-10-CM

## 2021-10-07 PROCEDURE — 99203 OFFICE O/P NEW LOW 30 MIN: CPT | Performed by: SURGERY

## 2021-10-07 NOTE — PROGRESS NOTES
I had the pleasure of seeing Mrs. Deleon in the vein clinic today.  She is a 51-year-old female with longstanding right thigh and upper calf varicose vein.  It has been getting increasingly symptomatic over the last couple of years.  She reports pain, throbbing, heaviness, burning and pruritus in the varicose vein.  She has not tried compression stockings.  This is significantly affecting her ability to work and her day-to-day living.  She has a busy household with children.  They take long family vacation with the drive and symptoms are significantly worse at that time.  She has never had episodes of phlebitis or deep venous thrombosis.    On examination she has a cluster of varicose veins in the lower right inner thigh going down to the right calf.  There is a small cluster in the right thigh.    Diagnosis: Symptomatic right lower extremity varicose veins, lifestyle limiting.    Plan: I explained the pathophysiology of disease to her in detail.  My recommendation would be to proceed with thigh-high compression stockings.  I will see her back in 3 months to reevaluate her and we will proceed with right lower extremity venous duplex sonography to evaluate for venous incompetence.

## 2021-10-07 NOTE — LETTER
10/7/2021         RE: Antoinette Deleon  [address] [facility] [address] [facility] [address]        Dear Colleague,    Thank you for referring your patient, Antoinette Deleon, to the [facility] [place]. Please see a copy of my visit note below.    I had the pleasure of seeing Mrs. Deleon in the vein clinic today.  She is a 51-year-old female with longstanding right thigh and upper calf varicose vein.  It has been getting increasingly symptomatic over the last couple of years.  She reports pain, throbbing, heaviness, burning and pruritus in the varicose vein.  She has not tried compression stockings.  This is significantly affecting her ability to work and her day-to-day living.  She has a busy household with children.  They take long family vacation with the drive and symptoms are significantly worse at that time.  She has never had episodes of phlebitis or deep venous thrombosis.    On examination she has a cluster of varicose veins in the lower right inner thigh going down to the right calf.  There is a small cluster in the right thigh.    Diagnosis: Symptomatic right lower extremity varicose veins, lifestyle limiting.    Plan: I explained the pathophysiology of disease to her in detail.  My recommendation would be to proceed with thigh-high compression stockings.  I will see her back in 3 months to reevaluate her and we will proceed with right lower extremity venous duplex sonography to evaluate for venous incompetence.      Again, thank you for allowing me to participate in the care of your patient.        Sincerely,        Rush Aparicio MD

## 2021-10-08 NOTE — PROGRESS NOTES
After Visit Follow Up  Per pt request, faxed their compression hose Rx to [facility] at [phone].   Pt would like 1 pair(s) of naturl, open-toe, thigh high, 20-30mmhg compression hose. Fax confirmed.    Pt aware they will be shipped to their home address.    ARON Austin

## 2021-12-13 ENCOUNTER — IMMUNIZATION (OUTPATIENT)
Dept: FAMILY MEDICINE | Facility: CLINIC | Age: 51
End: 2021-12-13
Payer: COMMERCIAL

## 2021-12-13 DIAGNOSIS — Z23 NEED FOR PROPHYLACTIC VACCINATION AND INOCULATION AGAINST INFLUENZA: Primary | ICD-10-CM

## 2021-12-13 PROCEDURE — 90682 RIV4 VACC RECOMBINANT DNA IM: CPT

## 2021-12-13 PROCEDURE — 99207 PR NO CHARGE NURSE ONLY: CPT

## 2021-12-13 PROCEDURE — 90471 IMMUNIZATION ADMIN: CPT

## 2022-02-08 DIAGNOSIS — G43.109 MIGRAINE WITH AURA AND WITHOUT STATUS MIGRAINOSUS, NOT INTRACTABLE: ICD-10-CM

## 2022-02-09 RX ORDER — SUMATRIPTAN 25 MG/1
TABLET, FILM COATED ORAL
Qty: 18 TABLET | Refills: 0 | Status: SHIPPED | OUTPATIENT
Start: 2022-02-09 | End: 2022-08-15

## 2022-02-09 NOTE — TELEPHONE ENCOUNTER
Routing refill request to provider for review/approval because:  Serotonin Agonist failed protocol    Morena RODRÍGUEZ RN  EP Triage

## 2022-03-22 ENCOUNTER — PATIENT OUTREACH (OUTPATIENT)
Dept: OBGYN | Facility: CLINIC | Age: 52
End: 2022-03-22
Payer: COMMERCIAL

## 2022-03-22 PROBLEM — R87.810 CERVICAL HIGH RISK HPV (HUMAN PAPILLOMAVIRUS) TEST POSITIVE: Status: ACTIVE | Noted: 2018-11-28

## 2022-05-11 ENCOUNTER — E-VISIT (OUTPATIENT)
Dept: URGENT CARE | Facility: URGENT CARE | Age: 52
End: 2022-05-11
Payer: COMMERCIAL

## 2022-05-11 DIAGNOSIS — B96.89 ACUTE BACTERIAL SINUSITIS: Primary | ICD-10-CM

## 2022-05-11 DIAGNOSIS — J01.90 ACUTE BACTERIAL SINUSITIS: Primary | ICD-10-CM

## 2022-05-11 PROCEDURE — 99421 OL DIG E/M SVC 5-10 MIN: CPT | Performed by: PHYSICIAN ASSISTANT

## 2022-05-11 NOTE — PATIENT INSTRUCTIONS
Sinusitis (Antibiotic Treatment)    The sinuses are air-filled spaces within the bones of the face. They connect to the inside of the nose. Sinusitis is an inflammation of the tissue that lines the sinuses. Sinusitis can occur during a cold. It can also happen due to allergies to pollens and other particles in the air. Sinusitis can cause symptoms of sinus congestion and a feeling of fullness. A sinus infection causes fever, headache, and facial pain. There is often green or yellow fluid draining from the nose or into the back of the throat (post-nasal drip). You have been given antibiotics to treat this condition.   Home care    Take the full course of antibiotics as instructed. Don't stop taking them, even when you feel better.    Drink plenty of water, hot tea, and other liquids as directed by the healthcare provider. This may help thin nasal mucus. It also may help your sinuses drain fluids.    Heat may help soothe painful areas of your face. Use a towel soaked in hot water. Or,  the shower and direct the warm spray onto your face. Using a vaporizer along with a menthol rub at night may also help soothe symptoms.     An expectorant with guaifenesin may help thin nasal mucus and help your sinuses drain fluids. Talk with your provider or pharmacists before taking an over-the-counter (OTC) medicine if you have any questions about it or its side effects..    You can use an OTC decongestant, unless a similar medicine was prescribed to you. Nasal sprays work the fastest. Use one that contains phenylephrine or oxymetazoline. First blow your nose gently. Then use the spray. Don't use these medicines more often than directed on the label. If you do, your symptoms may get worse. You may also take pills that contain pseudoephedrine. Don t use products that combine multiple medicines. This is because side effects may be increased. Read labels. You can also ask the pharmacist for help. (People with high blood  pressure should not use decongestants. They can raise blood pressure.) Talk with your provider or pharmacist if you have any questions about the medicine..    OTC antihistamines may help if allergies contributed to your sinusitis. Talk with your provider or pharmacist if you have any questions about the medicine..    Don't use nasal rinses or irrigation during an acute sinus infection, unless your healthcare provider tells you to. Rinsing may spread the infection to other areas in your sinuses.    Use acetaminophen or ibuprofen to control pain, unless another pain medicine was prescribed to you. If you have chronic liver or kidney disease or ever had a stomach ulcer, talk with your healthcare provider before using these medicines. Never give aspirin to anyone under age 18 who is ill with a fever. It may cause severe liver damage.    Don't smoke. This can make symptoms worse.    Follow-up care  Follow up with your healthcare provider, or as advised.   When to seek medical advice  Call your healthcare provider if any of these occur:     Facial pain or headache that gets worse    Stiff neck    Unusual drowsiness or confusion    Swelling of your forehead or eyelids    Symptoms don't go away in 10 days    Vision problems, such as blurred or double vision    Fever of 100.4 F (38 C) or higher, or as directed by your healthcare provider  Call 911  Call 911 if any of these occur:     Seizure    Trouble breathing    Feeling dizzy or faint    Fingernails, skin or lips look blue, purple , or gray  Prevention  Here are steps you can take to help prevent an infection:     Keep good hand washing habits.    Don t have close contact with people who have sore throats, colds, or other upper respiratory infections.    Don t smoke, and stay away from secondhand smoke.    Stay up to date with of your vaccines.  [employer] last reviewed this educational content on 12/1/2019 2000-2021 [employer]. All rights reserved. This  information is not intended as a substitute for professional medical care. Always follow your healthcare professional's instructions.        Dear Antoinette Deleon    After reviewing your responses, I've been able to diagnose you with?a sinus infection caused by bacteria.?     Based on your responses and diagnosis, I have prescribed Augmentin to treat your symptoms. I have sent this to your pharmacy.?     It is also important to stay well hydrated, get lots of rest and take over-the-counter decongestants,?tylenol?or ibuprofen if you?are able to?take those medications per your primary care provider to help relieve discomfort.?     It is important that you take?all of?your prescribed medication even if your symptoms are improving after a few doses.? Taking?all of?your medicine helps prevent the symptoms from returning.?     If your symptoms worsen, you develop severe headache, vomiting, high fever (>102), or are not improving in 7 days, please contact your primary care provider for an appointment or visit any of our convenient Walk-in Care or Urgent Care Centers to be seen which can be found on our website?here.?     Thanks again for choosing?us?as your health care partner,?   ?  Stephania Mirza PA-C?

## 2022-05-12 NOTE — PROGRESS NOTES
Antoinette is a 51 year old  female who presents for annual exam.     Besides routine health maintenance, she has no other health concerns today.    HPI: here for annual exam.  She is former Dr. Vásquez patient.  She has bicornuate uterus, so had 2 uterine ablations, but bleeding stopped completely.  Her endocrinologist deals with thyroid.  Has no other concerns today.    The patient's PCP is No primary care provider on file.        GYNECOLOGIC HISTORY:    No LMP recorded. Patient has had an ablation.    Regular menses? No, amenorrheic.    Her current contraception method is: none.  She  reports that she has never smoked. She has never used smokeless tobacco.    Patient is sexually active.  STD testing offered?  Declined     Last PHQ-9 score on record =   PHQ-9 SCORE 2022   PHQ-9 Total Score 2     Last GAD7 score on record =   ARACELI-7 SCORE 2022   Total Score 3     Alcohol Score = 3    HEALTH MAINTENANCE:  Cholesterol:   Recent Labs   Lab Test 19  0946 06/10/15  0000   CHOL 169 135   HDL 60 51   LDL 89 75   TRIG 101 57     Last Mammo: 21, Result: Normal, Next Mammo: Today   Pap:   Lab Results   Component Value Date    PAP NIL POS-HPV 2021    PAP NIL 2019    PAP NIL 2018      Colonoscopy:  21, Result: Normal, Next Colonoscopy:   Dexa:  N/A    Health maintenance updated:  no, due for pap.    HISTORY:  OB History    Para Term  AB Living   5 4 4 0 1 4   SAB IAB Ectopic Multiple Live Births   1 0 0 0 4      # Outcome Date GA Lbr Edwin/2nd Weight Sex Delivery Anes PTL Lv   5 Term            4 Term            3 SAB            2 Term         AVERY   1 Term         AVERY       Patient Active Problem List   Diagnosis     Hypothyroidism     Migraine with aura     Cervical high risk HPV (human papillomavirus) test positive     Dysfunctional uterine bleeding     DUB (dysfunctional uterine bleeding)     Past Surgical History:   Procedure Laterality Date     AS  HYSTEROSCOPY, SURGICAL; W/ ENDOMETRIAL ABLATION, ANY METHOD      Elicia     BLADDER SURGERY  08/2015    [facility]     COLONOSCOPY       COLONOSCOPY N/A 9/17/2021    Procedure: COLONOSCOPY;  Surgeon: Derek Oliva MD;  Location:  GI     DILATE CERVIX, HYSTEROSCOPY, ABLATE ENDOMETRIUM, COMBINED N/A 12/8/2020    Procedure: HYSTEROSCOPY, SEPTUM RESECTION, CERVICAL DILATION AND ENDOMETRIAL ABLATION;  Surgeon: Larry Vásquez MD;  Location:  OR     DILATION AND CURETTAGE, OPERATIVE HYSTEROSCOPY, COMBINED N/A 2/5/2020    Procedure: HYSTEROSCOPY,  DILATION AND CURETTAGE;  Surgeon: Yon Townsend MD;  Location:  OR     GYN SURGERY      ablation, tubal      SLING BLADDER SUSPENSION WITH FASCIA GREGG        Social History     Tobacco Use     Smoking status: Never Smoker     Smokeless tobacco: Never Used   Substance Use Topics     Alcohol use: Yes     Alcohol/week: 0.0 standard drinks     Comment: social      Problem (# of Occurrences) Relation (Name,Age of Onset)    Dementia (1) Mother    Diabetes Type 2  (1) Father    Hypertension (1) Father    Mental Illness (1) Mother    No Known Problems (6) Sister, Brother, Maternal Grandmother, Maternal Grandfather, Paternal Grandmother, Other            Current Outpatient Medications   Medication Sig     amoxicillin-clavulanate (AUGMENTIN) 875-125 MG tablet Take 1 tablet by mouth 2 times daily for 7 days     cholecalciferol (VITAMIN D) 1000 UNIT tablet Take 1,000 Units by mouth daily      diphenhydrAMINE (BENADRYL) 25 MG capsule Take 25 mg by mouth every 6 hours as needed for itching or allergies     liothyronine (CYTOMEL) 5 MCG tablet Take 10 mcg by mouth daily (2 X 5MCG)     multivitamin w/minerals (THERA-VIT-M) tablet Take 1 tablet by mouth daily      ondansetron (ZOFRAN) 4 MG tablet TAKE 1 TABLET(4 MG) BY MOUTH EVERY 8 HOURS AS NEEDED FOR NAUSEA OR MIGRAINES     SUMAtriptan (IMITREX) 25 MG tablet TAKE 1 TO 2 TABLETS (25-50MG) BY MOUTH AT ONSET OF HEADACHE FOR MIGRAINE.  "MAY REPEAT IN 2 HOURS. MAX 8 TABLETS IN 24 HOURS     SYNTHROID 137 MCG tablet Take 137 mcg by mouth daily      No current facility-administered medications for this visit.     No Known Allergies    Past medical, surgical, social and family histories were reviewed and updated in EPIC.    ROS:   12 point review of systems negative other than symptoms noted below or in the HPI.  No urinary frequency or dysuria, bladder or kidney problems    EXAM:  /72   Ht 1.791 m (5' 10.5\")   Wt 88.5 kg (195 lb)   BMI 27.58 kg/m     BMI: Body mass index is 27.58 kg/m .    PHYSICAL EXAM:  Constitutional:   Appearance: Well nourished, well developed, alert, in no acute distress  Neck:  Lymph Nodes:  No lymphadenopathy present    Thyroid:  Gland size normal, nontender, no nodules or masses present  on palpation  Chest:  Respiratory Effort:  Breathing unlabored  Cardiovascular:    Heart: Auscultation:  Regular rate, normal rhythm, no murmurs present  Breasts: Inspection of Breasts:  No lymphadenopathy present., Palpation of Breasts and Axillae:  No masses present on palpation, no breast tenderness., Axillary Lymph Nodes:  No lymphadenopathy present. and No nodularity, asymmetry or nipple discharge bilaterally.  Gastrointestinal:   Abdominal Examination:  Abdomen nontender to palpation, tone normal without rigidity or guarding, no masses present, umbilicus without lesions   Liver and Spleen:  No hepatomegaly present, liver nontender to palpation    Hernias:  No hernias present  Lymphatic: Lymph Nodes:  No other lymphadenopathy present  Skin:  General Inspection:  No rashes present, no lesions present, no areas of  discoloration  Neurologic:    Mental Status:  Oriented X3.  Normal strength and tone, sensory exam                grossly normal, mentation intact and speech normal.    Psychiatric:   Mentation appears normal and affect normal/bright.         Pelvic Exam:  External Genitalia:     Normal appearance for age, no discharge " present, no tenderness present, no inflammatory lesions present, color normal  Vagina:     Normal vaginal vault without central or paravaginal defects, no discharge present, no inflammatory lesions present, no masses present  Bladder:     Nontender to palpation  Urethra:   Urethral Body:  Urethra palpation normal, urethra structural support normal   Urethral Meatus:  No erythema or lesions present  Cervix:     Appearance healthy, no lesions present, nontender to palpation, no bleeding present  Uterus:     Uterus: firm, normal sized and nontender, anteverted in position.   Adnexa:     No adnexal tenderness present, no adnexal masses present  Perineum:     Perineum within normal limits, no evidence of trauma, no rashes or skin lesions present  Anus:     Anus within normal limits, no hemorrhoids present  Inguinal Lymph Nodes:     No lymphadenopathy present  Pubic Hair:     Normal pubic hair distribution for age  Genitalia and Groin:     No rashes present, no lesions present, no areas of discoloration, no masses present      COUNSELING:   Reviewed preventive health counseling, as reflected in patient instructions       Regular exercise       Healthy diet/nutrition       Colorectal Cancer Screening       (Suni)menopause management    BMI: Body mass index is 27.58 kg/m .      ASSESSMENT:  51 year old female with satisfactory annual exam.    ICD-10-CM    1. Encounter for gynecological examination without abnormal finding  Z01.419    2. Cervical high risk HPV (human papillomavirus) test positive  R87.810 Pap screen with HPV - recommended age 30 - 65 years   3. Screening for ischemic heart disease  Z13.6 Lipid Profile   4. Screening for diabetes mellitus  Z13.1 Comprehensive metabolic panel       PLAN:  Normal Gyn exam.  Patient to return fasting for blood work.  Return prn or 1 year for annual and pap.    PHILOMENA Borjas CNP

## 2022-05-16 ENCOUNTER — ANCILLARY PROCEDURE (OUTPATIENT)
Dept: MAMMOGRAPHY | Facility: CLINIC | Age: 52
End: 2022-05-16
Payer: COMMERCIAL

## 2022-05-16 ENCOUNTER — OFFICE VISIT (OUTPATIENT)
Dept: OBGYN | Facility: CLINIC | Age: 52
End: 2022-05-16
Payer: COMMERCIAL

## 2022-05-16 VITALS
BODY MASS INDEX: 27.3 KG/M2 | HEIGHT: 71 IN | WEIGHT: 195 LBS | DIASTOLIC BLOOD PRESSURE: 72 MMHG | SYSTOLIC BLOOD PRESSURE: 122 MMHG

## 2022-05-16 DIAGNOSIS — R87.810 CERVICAL HIGH RISK HPV (HUMAN PAPILLOMAVIRUS) TEST POSITIVE: ICD-10-CM

## 2022-05-16 DIAGNOSIS — Z12.31 VISIT FOR SCREENING MAMMOGRAM: ICD-10-CM

## 2022-05-16 DIAGNOSIS — Z13.1 SCREENING FOR DIABETES MELLITUS: ICD-10-CM

## 2022-05-16 DIAGNOSIS — Z01.419 ENCOUNTER FOR GYNECOLOGICAL EXAMINATION WITHOUT ABNORMAL FINDING: Primary | ICD-10-CM

## 2022-05-16 DIAGNOSIS — Z13.6 SCREENING FOR ISCHEMIC HEART DISEASE: ICD-10-CM

## 2022-05-16 PROCEDURE — 99396 PREV VISIT EST AGE 40-64: CPT | Performed by: NURSE PRACTITIONER

## 2022-05-16 PROCEDURE — G0145 SCR C/V CYTO,THINLAYER,RESCR: HCPCS | Performed by: NURSE PRACTITIONER

## 2022-05-16 PROCEDURE — 77067 SCR MAMMO BI INCL CAD: CPT | Mod: TC | Performed by: RADIOLOGY

## 2022-05-16 PROCEDURE — 87624 HPV HI-RISK TYP POOLED RSLT: CPT | Performed by: NURSE PRACTITIONER

## 2022-05-16 ASSESSMENT — ANXIETY QUESTIONNAIRES
1. FEELING NERVOUS, ANXIOUS, OR ON EDGE: NOT AT ALL
7. FEELING AFRAID AS IF SOMETHING AWFUL MIGHT HAPPEN: NOT AT ALL
IF YOU CHECKED OFF ANY PROBLEMS ON THIS QUESTIONNAIRE, HOW DIFFICULT HAVE THESE PROBLEMS MADE IT FOR YOU TO DO YOUR WORK, TAKE CARE OF THINGS AT HOME, OR GET ALONG WITH OTHER PEOPLE: NOT DIFFICULT AT ALL
3. WORRYING TOO MUCH ABOUT DIFFERENT THINGS: SEVERAL DAYS
6. BECOMING EASILY ANNOYED OR IRRITABLE: NOT AT ALL
5. BEING SO RESTLESS THAT IT IS HARD TO SIT STILL: SEVERAL DAYS
GAD7 TOTAL SCORE: 3
2. NOT BEING ABLE TO STOP OR CONTROL WORRYING: NOT AT ALL

## 2022-05-16 ASSESSMENT — PATIENT HEALTH QUESTIONNAIRE - PHQ9
5. POOR APPETITE OR OVEREATING: SEVERAL DAYS
SUM OF ALL RESPONSES TO PHQ QUESTIONS 1-9: 2

## 2022-05-17 ASSESSMENT — ANXIETY QUESTIONNAIRES: GAD7 TOTAL SCORE: 3

## 2022-05-19 LAB
BKR LAB AP GYN ADEQUACY: NORMAL
BKR LAB AP GYN INTERPRETATION: NORMAL
BKR LAB AP HPV REFLEX: NORMAL
BKR LAB AP PREVIOUS ABNL DX: NORMAL
BKR LAB AP PREVIOUS ABNORMAL: NORMAL
PATH REPORT.COMMENTS IMP SPEC: NORMAL
PATH REPORT.COMMENTS IMP SPEC: NORMAL
PATH REPORT.RELEVANT HX SPEC: NORMAL

## 2022-05-23 ENCOUNTER — PATIENT OUTREACH (OUTPATIENT)
Dept: OBGYN | Facility: CLINIC | Age: 52
End: 2022-05-23
Payer: COMMERCIAL

## 2022-05-23 DIAGNOSIS — R87.810 CERVICAL HIGH RISK HPV (HUMAN PAPILLOMAVIRUS) TEST POSITIVE: ICD-10-CM

## 2022-05-23 LAB
HUMAN PAPILLOMA VIRUS 16 DNA: NEGATIVE
HUMAN PAPILLOMA VIRUS 18 DNA: NEGATIVE
HUMAN PAPILLOMA VIRUS FINAL DIAGNOSIS: ABNORMAL
HUMAN PAPILLOMA VIRUS OTHER HR: POSITIVE

## 2022-05-25 ENCOUNTER — THERAPY VISIT (OUTPATIENT)
Dept: OCCUPATIONAL THERAPY | Facility: CLINIC | Age: 52
End: 2022-05-25
Payer: COMMERCIAL

## 2022-05-25 DIAGNOSIS — M79.632 PAIN OF LEFT FOREARM: ICD-10-CM

## 2022-05-25 PROCEDURE — 97140 MANUAL THERAPY 1/> REGIONS: CPT | Mod: GO | Performed by: OCCUPATIONAL THERAPIST

## 2022-05-25 PROCEDURE — 97110 THERAPEUTIC EXERCISES: CPT | Mod: GO | Performed by: OCCUPATIONAL THERAPIST

## 2022-05-25 PROCEDURE — 97165 OT EVAL LOW COMPLEX 30 MIN: CPT | Mod: GO | Performed by: OCCUPATIONAL THERAPIST

## 2022-05-25 NOTE — PROGRESS NOTES
Hand Therapy Initial Evaluation    Current Date:  5/25/2022    Diagnosis: Left hand and wrist pain  DOI: May 6, 2022    Subjective:  Antoinette Deleon is a 51 year old female.    Patient reports symptoms of the left wrist and hand which occurred due to overuse. Since onset symptoms are Unchanged    Current occupation is   Job Tasks: Computer Work, Prolonged Sitting, Prolonged Standing, Repetitive Tasks    Answers for HPI/ROS submitted by the patient on 5/24/2022  Reason for Visit:: Left Wrist/ hand pain  When problem began:: 5/14/2022  How problem occurred:: Not exactly sure, overuse???  Number scale: 5/10  General health as reported by patient: excellent  Please check all that apply to your current or past medical history: migraines/headaches, numbness/tingling, pain at night/rest, thyroid problems  Medical allergies: none  Surgeries: other  Other Surgery Detail: Female post partum surgeries, abalation  Medications you are currently taking: thyroid medication, other  Other Meds Detail: Migraine meds  What are your primary job tasks: computer work, prolonged sitting  Other Tasks Detail: Writing client notes    Occupational Profile Information:  Left hand dominant  Prior functional level:  no limitations  Patient reports symptoms of pain, stiffness/loss of motion, weakness/loss of strength, numbness and tingling   Special tests:  none.    Previous treatment: none  Barriers include:none  Mobility: No difficulty  Transportation: drives  Currently working in normal job without restrictions  Leisure activities/hobbies: gardening, cooking, playing with dog    Functional Outcome Measure:   Upper Extremity Functional Index Score:  SCORE:   Column Totals: /80: (P) 34   (A lower score indicates greater disability.)    Objective:  Pain Level (Scale 0-10)   5/25/2022   At Rest 4-5 and numb and tingling   With Use 10     Pain Description  Date 5/25/2022   Location hand and thumb   Pain Quality Aching,  Burning, Throbbing and Tingling   Frequency intermittent     Pain is worst  daytime   Exacerbated by  use   Relieved by cold   Progression unchanged     Edema  Mild    Sensation   Decreased Radial Nerve distribution per pt report    ROM   Full ROM    Palpation 5/25/2022       Pec major ++       Pec minor ++       Lat/subscap ++       Tricep ++       BR ++       Extensor wad ++       Dorsal fa ++                 Resisted Testing 5/25/2022       Tricep ext ++       Supination ++       Wrist ext with elbow at side +++       Wrist ext with elbow extended +++       Long finger extended +++             Strength  NT    Assessment:  Patient presents with symptoms consistent with diagnosis of left forearm pain,  with conservative intervention.     Patient's limitations or Problem List includes:  Pain, Weakness, Decreased  and Tightness in musculature of the left arm which interferes with the patient's ability to perform Self Care Tasks (dressing, eating, bathing, hygiene/toileting), Work Tasks, Sleep Patterns, Recreational Activities, Household Chores and Driving  as compared to previous level of function.    Rehab Potential:  Excellent - Return to full activity, no limitations    Patient will benefit from skilled Occupational Therapy to increase flexibility and overall strength and decrease pain to return to previous activity level and resume normal daily tasks and to reach their rehab potential.    Barriers to Learning:  No barrier    Communication Issues:  Patient appears to be able to clearly communicate and understand verbal and written communication and follow directions correctly.    Chart Review: Simple history review with patient    Identified Performance Deficits: bathing/showering, toileting, dressing, hygiene and grooming, care of others, care of pets, child rearing, health management and maintenance, home establishment and management, meal preparation and cleanup, shopping, sleep, work and leisure activities     Assessment of Occupational Performance:  5 or more Performance Deficits    Clinical Decision Making (Complexity): Low complexity    Treatment Explanation:  The following has been discussed with the patient:  RX ordered/plan of care  Anticipated outcomes  Possible risks and side effects    Plan:  Frequency:  2./month, once daily  Duration:  for 4 visits    Treatment Plan:   Therapeutic Exercise:  AROM  Neuromuscular re-education:  Nerve Gliding and Posture  Manual Techniques:  Myofascial release  Self Care:  Self Care Tasks, Ergonomic Considerations and Work Tasks    Discharge Plan:  Achieve all LTG.  Independent in home treatment program.  Reach maximal therapeutic benefit.    Home Exercise Program:  Exercise Name: Pectoralis Major Trigger Release, Sets: 1 - Sessions: 1x every other day, Notes: 60 sec hold on each knot  Exercise Name: Pectoralis Minor Trigger Release, Sets: 1 - Sessions: 1x every other day, Notes: Also roll to the side and do side muscles.  Exercise Name: Latissimus/Teres/Serratus/Subscapularis Trigger Release, Sets: 1 - Sessions: 1x every other day, Notes: Make sure you go into tricep a bit too.  60 sec hold on each knot  Exercise Name: Self Elbow Mobilization, Trigger Point Massage Over Radial Head - Sessions: 1x every other day, Notes: Use golf ball to do pressure. Do inner and outer wad of forearm  Finger massage on the tendons in wrist    Next Visit:  Rest of foam roller exercises  Radial nerve gliding  Scapular strengthening

## 2022-06-13 ENCOUNTER — OFFICE VISIT (OUTPATIENT)
Dept: FAMILY MEDICINE | Facility: CLINIC | Age: 52
End: 2022-06-13
Payer: COMMERCIAL

## 2022-06-13 VITALS — SYSTOLIC BLOOD PRESSURE: 118 MMHG | DIASTOLIC BLOOD PRESSURE: 82 MMHG

## 2022-06-13 DIAGNOSIS — L81.4 LENTIGINES: ICD-10-CM

## 2022-06-13 DIAGNOSIS — L57.8 PHOTOAGING OF SKIN: ICD-10-CM

## 2022-06-13 DIAGNOSIS — L82.1 SEBORRHEIC KERATOSIS: ICD-10-CM

## 2022-06-13 DIAGNOSIS — D18.01 CHERRY ANGIOMA: ICD-10-CM

## 2022-06-13 DIAGNOSIS — D23.9 DERMATOFIBROMA: ICD-10-CM

## 2022-06-13 DIAGNOSIS — D22.9 MULTIPLE BENIGN NEVI: Primary | ICD-10-CM

## 2022-06-13 PROCEDURE — 99202 OFFICE O/P NEW SF 15 MIN: CPT | Performed by: PHYSICIAN ASSISTANT

## 2022-06-13 NOTE — PROGRESS NOTES
[facility] Dermatology Note  Encounter Date: Jun 13, 2022  Office Visit     Dermatology Problem List:  1. AK s/p cryo  ____________________________________________    Assessment & Plan:    # Multiple benign nevi. Solar lentigines.   - No concerning lesions today  - Monitor for ABCDEs of melanoma   - Continue sun protection - recommend SPF 30 or higher with frequent application   - Return sooner if noticing changing or symptomatic lesions    # Cherry angiomas. Seborrheic keratoses. Dermatofibroma.   Discussed the natural history and benign nature of this lesion. Reassurance provided that no additional treatment is necessary.    # Photoaging of skin  Recommended using an OTC retinoid product such as Differin gel. For preventative measures, counseled on need to use sunscreen every day of the week. Recommended Skinceuticals UV fusion or another sunsncreen with iron oxide. For lightening, recommended an OTC hydroquinone product. IF she tolerates OTC products, can consider a prescription form in the future.     Procedures Performed:   None    Follow-up: 1 year(s) in-person, or earlier for new or changing lesions    Staff and Scribe:     Scribe Disclosure:  I, Zee Whaley, am serving as a scribe to document services personally performed by Lynn Soares PA-C based on data collection and the provider's statements to me.   Provider Disclosure:   The documentation recorded by the scribe accurately reflects the services I personally performed and the decisions made by me.    All risks, benefits and alternatives were discussed with patient.  Patient is in agreement and understands the assessment and plan.  All questions were answered.    Lynn Soares PA-C, MPAS  [facility] [place]: Phone: [phone], Fax: [phone]  [facility]: Phone: [phone],  Fax: [phone]  [facility]: Phone:  [phone], Fax: [phone]  ____________________________________________    CC: Skin Check    HPI:  Ms. Antoinette Deleon is a(n) 51 year old female who presents today as a return patient for [facility] [place] [place]. Last sen by Isa Chanel on 8/23/19 at which point1 AK was treated with cryo.     Today, the patient denies any particular lesions of concern. She does point to a lesion on her leg that she has previously asked about and was told it is benign. No personal history of skin cancer. No family history of melanoma. She would like to know the best products for anti-aging. Patient is otherwise feeling well, without additional skin concerns.    Labs Reviewed:  N/A    Physical Exam:  Vitals: /82   SKIN: Full skin, which includes the head/face, both arms, chest, back, abdomen,both legs, genitalia and/or groin buttocks, digits and/or nails, was examined.  - There are dome shaped bright red papules on the trunk and extremities.   - Multiple regular brown pigmented macules and papules are identified on the trunk and extremities.   - Scattered brown macules on sun exposed areas.  - There are waxy stuck on tan to brown papules on the trunk and extremities.   - There is a firm tan/flesh colored papule that dimples with lateral pressure on the R anterior thigh.   - No other lesions of concern on areas examined.     Medications:  Current Outpatient Medications   Medication     cholecalciferol (VITAMIN D) 1000 UNIT tablet     liothyronine (CYTOMEL) 5 MCG tablet     multivitamin w/minerals (THERA-VIT-M) tablet     ondansetron (ZOFRAN) 4 MG tablet     SUMAtriptan (IMITREX) 25 MG tablet     SYNTHROID 137 MCG tablet     No current facility-administered medications for this visit.      Past Medical History:   Patient Active Problem List   Diagnosis     Hypothyroidism     Migraine with aura     Cervical high risk HPV (human papillomavirus) test positive     Dysfunctional uterine bleeding     DUB (dysfunctional uterine bleeding)      Pain of left forearm     Past Medical History:   Diagnosis Date     Cervical high risk HPV (human papillomavirus) test positive 11/28/2018     Menorrhagia      Thyroid disease

## 2022-06-13 NOTE — LETTER
6/13/2022         RE: Antoinette Deleon  [address]   [place] [address]        Dear Colleague,    Thank you for referring your patient, Antoinette Deleon, to the [facility] [place]. Please see a copy of my visit note below.    [facility] Dermatology Note  Encounter Date: Jun 13, 2022  Office Visit     Dermatology Problem List:  1. AK s/p cryo  ____________________________________________    Assessment & Plan:    # Multiple benign nevi. Solar lentigines.   - No concerning lesions today  - Monitor for ABCDEs of melanoma   - Continue sun protection - recommend SPF 30 or higher with frequent application   - Return sooner if noticing changing or symptomatic lesions    # Cherry angiomas. Seborrheic keratoses. Dermatofibroma.   Discussed the natural history and benign nature of this lesion. Reassurance provided that no additional treatment is necessary.    # Photoaging of skin  Recommended using an OTC retinoid product such as Differin gel. For preventative measures, counseled on need to use sunscreen every day of the week. Recommended Skinceuticals UV fusion or another sunsncreen with iron oxide. For lightening, recommended an OTC hydroquinone product. IF she tolerates OTC products, can consider a prescription form in the future.     Procedures Performed:   None    Follow-up: 1 year(s) in-person, or earlier for new or changing lesions    Staff and Scribe:     Scribe Disclosure:  I, Zee Whaley, am serving as a scribe to document services personally performed by Lynn Soares PA-C based on data collection and the provider's statements to me.   Provider Disclosure:   The documentation recorded by the scribe accurately reflects the services I personally performed and the decisions made by me.    All risks, benefits and alternatives were discussed with patient.  Patient is in agreement and understands the assessment and plan.  All questions were  answered.    Lynn Soares PA-C, MPAS  [facility]: Phone: [phone], Fax: [phone]  [facility]: Phone: [phone],  Fax: [phone]  [facility]: Phone: [phone], Fax: [phone]  ____________________________________________    CC: Skin Check    HPI:  Ms. Antoinette Deleon is a(n) 51 year old female who presents today as a return patient for [facility] [place]. Last sen by Isa Chanel on 8/23/19 at which point1 AK was treated with cryo.     Today, the patient denies any particular lesions of concern. She does point to a lesion on her leg that she has previously asked about and was told it is benign. No personal history of skin cancer. No family history of melanoma. She would like to know the best products for anti-aging. Patient is otherwise feeling well, without additional skin concerns.    Labs Reviewed:  N/A    Physical Exam:  Vitals: /82   SKIN: Full skin, which includes the head/face, both arms, chest, back, abdomen,both legs, genitalia and/or groin buttocks, digits and/or nails, was examined.  - There are dome shaped bright red papules on the trunk and extremities.   - Multiple regular brown pigmented macules and papules are identified on the trunk and extremities.   - Scattered brown macules on sun exposed areas.  - There are waxy stuck on tan to brown papules on the trunk and extremities.   - There is a firm tan/flesh colored papule that dimples with lateral pressure on the R anterior thigh.   - No other lesions of concern on areas examined.     Medications:  Current Outpatient Medications   Medication     cholecalciferol (VITAMIN D) 1000 UNIT tablet     liothyronine (CYTOMEL) 5 MCG tablet     multivitamin w/minerals (THERA-VIT-M) tablet     ondansetron (ZOFRAN) 4 MG tablet     SUMAtriptan (IMITREX) 25 MG tablet     SYNTHROID 137 MCG tablet     No current facility-administered  medications for this visit.      Past Medical History:   Patient Active Problem List   Diagnosis     Hypothyroidism     Migraine with aura     Cervical high risk HPV (human papillomavirus) test positive     Dysfunctional uterine bleeding     DUB (dysfunctional uterine bleeding)     Pain of left forearm     Past Medical History:   Diagnosis Date     Cervical high risk HPV (human papillomavirus) test positive 11/28/2018     Menorrhagia      Thyroid disease            Again, thank you for allowing me to participate in the care of your patient.        Sincerely,        Lynn Soares PA-C

## 2022-08-12 ENCOUNTER — E-VISIT (OUTPATIENT)
Dept: URGENT CARE | Facility: CLINIC | Age: 52
End: 2022-08-12
Payer: COMMERCIAL

## 2022-08-12 DIAGNOSIS — R21 RASH AND NONSPECIFIC SKIN ERUPTION: Primary | ICD-10-CM

## 2022-08-12 PROCEDURE — 99207 PR NON-BILLABLE SERV PER CHARTING: CPT | Performed by: INTERNAL MEDICINE

## 2022-08-12 NOTE — PATIENT INSTRUCTIONS
Dear Antoinette Deleon,    We are sorry you are not feeling well. Based on the responses you provided, it is recommended that you be seen in-person in urgent care so we can better evaluate your symptoms as based on the pictures this could be a skin infection. Please click here to find the nearest urgent care location to you.   You will not be charged for this Visit. Thank you for trusting us with your care.    Madelin Hickey MD

## 2022-10-22 ENCOUNTER — HEALTH MAINTENANCE LETTER (OUTPATIENT)
Age: 52
End: 2022-10-22

## 2022-10-30 ASSESSMENT — ENCOUNTER SYMPTOMS
FEVER: 0
NERVOUS/ANXIOUS: 0
HEARTBURN: 0
SHORTNESS OF BREATH: 0
DIARRHEA: 0
CHILLS: 0
DYSURIA: 0
HEADACHES: 0
ABDOMINAL PAIN: 0
BREAST MASS: 0
PALPITATIONS: 0
DIZZINESS: 0
MYALGIAS: 0
COUGH: 0
WEAKNESS: 0
FREQUENCY: 0
EYE PAIN: 0
HEMATOCHEZIA: 0
NAUSEA: 0
SORE THROAT: 0
PARESTHESIAS: 0
CONSTIPATION: 0
ARTHRALGIAS: 0
JOINT SWELLING: 0
HEMATURIA: 0

## 2022-10-31 ENCOUNTER — OFFICE VISIT (OUTPATIENT)
Dept: FAMILY MEDICINE | Facility: CLINIC | Age: 52
End: 2022-10-31
Payer: COMMERCIAL

## 2022-10-31 VITALS
HEART RATE: 80 BPM | SYSTOLIC BLOOD PRESSURE: 104 MMHG | BODY MASS INDEX: 28.42 KG/M2 | TEMPERATURE: 97.8 F | OXYGEN SATURATION: 99 % | RESPIRATION RATE: 18 BRPM | WEIGHT: 203 LBS | DIASTOLIC BLOOD PRESSURE: 78 MMHG | HEIGHT: 71 IN

## 2022-10-31 DIAGNOSIS — E03.9 HYPOTHYROIDISM, UNSPECIFIED TYPE: ICD-10-CM

## 2022-10-31 DIAGNOSIS — Z00.00 ROUTINE GENERAL MEDICAL EXAMINATION AT A HEALTH CARE FACILITY: Primary | ICD-10-CM

## 2022-10-31 DIAGNOSIS — E66.3 OVERWEIGHT (BMI 25.0-29.9): ICD-10-CM

## 2022-10-31 DIAGNOSIS — G43.109 MIGRAINE WITH AURA AND WITHOUT STATUS MIGRAINOSUS, NOT INTRACTABLE: ICD-10-CM

## 2022-10-31 DIAGNOSIS — Z13.6 CARDIOVASCULAR SCREENING; LDL GOAL LESS THAN 100: ICD-10-CM

## 2022-10-31 PROCEDURE — 80061 LIPID PANEL: CPT | Performed by: PHYSICIAN ASSISTANT

## 2022-10-31 PROCEDURE — 99396 PREV VISIT EST AGE 40-64: CPT | Mod: 25 | Performed by: PHYSICIAN ASSISTANT

## 2022-10-31 PROCEDURE — 90471 IMMUNIZATION ADMIN: CPT | Performed by: PHYSICIAN ASSISTANT

## 2022-10-31 PROCEDURE — 90682 RIV4 VACC RECOMBINANT DNA IM: CPT | Performed by: PHYSICIAN ASSISTANT

## 2022-10-31 PROCEDURE — 84443 ASSAY THYROID STIM HORMONE: CPT | Performed by: PHYSICIAN ASSISTANT

## 2022-10-31 PROCEDURE — 99214 OFFICE O/P EST MOD 30 MIN: CPT | Mod: 25 | Performed by: PHYSICIAN ASSISTANT

## 2022-10-31 PROCEDURE — 84439 ASSAY OF FREE THYROXINE: CPT | Performed by: PHYSICIAN ASSISTANT

## 2022-10-31 PROCEDURE — 36415 COLL VENOUS BLD VENIPUNCTURE: CPT | Performed by: PHYSICIAN ASSISTANT

## 2022-10-31 PROCEDURE — 80053 COMPREHEN METABOLIC PANEL: CPT | Performed by: PHYSICIAN ASSISTANT

## 2022-10-31 RX ORDER — ONDANSETRON 4 MG/1
TABLET, FILM COATED ORAL
Qty: 30 TABLET | Refills: 4 | Status: SHIPPED | OUTPATIENT
Start: 2022-10-31 | End: 2024-05-24

## 2022-10-31 RX ORDER — ASCORBIC ACID 100 MG
TABLET,CHEWABLE ORAL
COMMUNITY

## 2022-10-31 RX ORDER — SUMATRIPTAN 25 MG/1
TABLET, FILM COATED ORAL
Qty: 18 TABLET | Refills: 3 | Status: SHIPPED | OUTPATIENT
Start: 2022-10-31 | End: 2024-05-24

## 2022-10-31 ASSESSMENT — ENCOUNTER SYMPTOMS
PARESTHESIAS: 0
WEAKNESS: 0
SORE THROAT: 0
HEMATOCHEZIA: 0
CONSTIPATION: 0
SHORTNESS OF BREATH: 0
FREQUENCY: 0
HEMATURIA: 0
EYE PAIN: 0
NERVOUS/ANXIOUS: 0
DIZZINESS: 0
JOINT SWELLING: 0
FEVER: 0
HEARTBURN: 0
BREAST MASS: 0
ABDOMINAL PAIN: 0
DIARRHEA: 0
COUGH: 0
HEADACHES: 0
ARTHRALGIAS: 0
DYSURIA: 0
NAUSEA: 0
PALPITATIONS: 0
CHILLS: 0
MYALGIAS: 0

## 2022-10-31 NOTE — PROGRESS NOTES
SUBJECTIVE:   CC: Antoinette is an 52 year old who presents for preventive health visit.       Patient has been advised of split billing requirements and indicates understanding: Yes  Healthy Habits:     Getting at least 3 servings of Calcium per day:  Yes    Bi-annual eye exam:  Yes    Dental care twice a year:  Yes    Sleep apnea or symptoms of sleep apnea:  None    Diet:  Carbohydrate counting    Frequency of exercise:  4-5 days/week    Duration of exercise:  30-45 minutes    Taking medications regularly:  Yes    Medication side effects:  None    PHQ-2 Total Score: 1    Additional concerns today:  No        Migraine     Since your last clinic visit, how have your headaches changed?  Improved    How often are you getting headaches or migraines? Maybe once a quarter      Are you able to do normal daily activities when you have a migraine? No    Are you taking rescue/relief medications? (Select all that apply) sumatriptan (Imitrex) and Other: Zofran     How helpful is your rescue/relief medication?  I get total relief    Are you taking any medications to prevent migraines? (Select all that apply)  No    In the past 4 weeks, how often have you gone to urgent care or the emergency room because of your headaches?  0       Antoinette has been having trouble losing weight over the past few months.  A few years ago she was 220 lb.  She lost about 40 lbs during the COVID 19 pandemic through diet and exercise.  She has been having a lot of stress with her mom recently who is in an assisted living facility and diagnosed with dementia. Antoinette has been walking each day but does not have as much time for long walks as she did previously.  continuing to work on diet  Following with endo for hypothryroidism    Today's PHQ-2 Score:   PHQ-2 ( 1999 Pfizer) 10/30/2022   Q1: Little interest or pleasure in doing things 0   Q2: Feeling down, depressed or hopeless 1   PHQ-2 Score 1   PHQ-2 Total Score (12-17 Years)- Positive if 3 or more  points; Administer PHQ-A if positive -   Q1: Little interest or pleasure in doing things Not at all   Q2: Feeling down, depressed or hopeless Several days   PHQ-2 Score 1       Abuse: Current or Past (Physical, Sexual or Emotional) - No  Do you feel safe in your environment? Yes        Social History     Tobacco Use     Smoking status: Never     Smokeless tobacco: Never   Substance Use Topics     Alcohol use: Yes     Comment: social     If you drink alcohol do you typically have >3 drinks per day or >7 drinks per week? No    Alcohol Use 10/31/2022   Prescreen: >3 drinks/day or >7 drinks/week? -   Prescreen: >3 drinks/day or >7 drinks/week? No     Reviewed orders with patient.  Reviewed health maintenance and updated orders accordingly - Yes  Patient Active Problem List   Diagnosis     Hypothyroidism     Migraine with aura     Cervical high risk HPV (human papillomavirus) test positive     Dysfunctional uterine bleeding     DUB (dysfunctional uterine bleeding)     Pain of left forearm     Past Surgical History:   Procedure Laterality Date     AS HYSTEROSCOPY, SURGICAL; W/ ENDOMETRIAL ABLATION, ANY METHOD      Elicia     BLADDER SURGERY  08/2015    [facility] [place]     COLONOSCOPY       COLONOSCOPY N/A 9/17/2021    Procedure: COLONOSCOPY;  Surgeon: Derek Oliva MD;  Location:  GI     DILATE CERVIX, HYSTEROSCOPY, ABLATE ENDOMETRIUM, COMBINED N/A 12/8/2020    Procedure: HYSTEROSCOPY, SEPTUM RESECTION, CERVICAL DILATION AND ENDOMETRIAL ABLATION;  Surgeon: Larry Vásquez MD;  Location:  OR     DILATION AND CURETTAGE, OPERATIVE HYSTEROSCOPY, COMBINED N/A 2/5/2020    Procedure: HYSTEROSCOPY,  DILATION AND CURETTAGE;  Surgeon: Yon Townsend MD;  Location:  OR     GYN SURGERY      ablation, tubal      SLING BLADDER SUSPENSION WITH FASCIA GREGG         Social History     Tobacco Use     Smoking status: Never     Smokeless tobacco: Never   Substance Use Topics     Alcohol use: Yes     Comment: social     Family History    Problem Relation Age of Onset     Mental Illness Mother      Dementia Mother      Thyroid Disease Mother      Diabetes Type 2  Father      Hypertension Father      Diabetes Father      Mental Illness Father      Thyroid Disease Father      No Known Problems Sister      No Known Problems Brother      Cerebrovascular Disease Maternal Grandmother      No Known Problems Maternal Grandfather      No Known Problems Paternal Grandmother      No Known Problems Other      Hypertension Brother          Current Outpatient Medications   Medication Sig Dispense Refill     Ascorbic Acid (VITAMIN C) 100 MG CHEW        cholecalciferol (VITAMIN D) 1000 UNIT tablet Take 1,000 Units by mouth daily        liothyronine (CYTOMEL) 5 MCG tablet Take 10 mcg by mouth daily (2 X 5MCG)       multivitamin w/minerals (THERA-VIT-M) tablet Take 1 tablet by mouth daily        ondansetron (ZOFRAN) 4 MG tablet TAKE 1 TABLET(4 MG) BY MOUTH EVERY 8 HOURS AS NEEDED FOR NAUSEA OR MIGRAINES Strength: 4 mg 30 tablet 4     SUMAtriptan (IMITREX) 25 MG tablet TAKE 1 TO 2 TABLETS(25 TO 50 MG) BY MOUTH AT ONSET OF HEADACHE FOR MIGRAINE. MAY REPEAT IN 2 HOURS. MAX 8 TABLETS IN 24 HOURS 18 tablet 3     SYNTHROID 137 MCG tablet Take 137 mcg by mouth daily        No Known Allergies    Breast Cancer Screening:    Breast CA Risk Assessment (FHS-7) 10/30/2022   Do you have a family history of breast, colon, or ovarian cancer? No / Unknown         Mammogram Screening: Recommended annual mammography  Pertinent mammograms are reviewed under the imaging tab.    History of abnormal Pap smear: NO - age 30-65 PAP every 5 years with negative HPV co-testing recommended  PAP / HPV Latest Ref Rng & Units 5/16/2022 4/8/2021 12/3/2019   PAP   Negative for Intraepithelial Lesion or Malignancy (NILM) - -   PAP (Historical) - - NIL NIL   HPV16 Negative Negative Negative Negative   HPV18 Negative Negative Negative Negative   HRHPV Negative Positive(A) Positive(A) Negative      Reviewed and updated as needed this visit by clinical staff   Tobacco  Allergies  Meds   Med Hx  Surg Hx  Fam Hx          Reviewed and updated as needed this visit by Provider   Tobacco     Med Hx  Surg Hx  Fam Hx         Past Medical History:   Diagnosis Date     Cervical high risk HPV (human papillomavirus) test positive 2018     Menorrhagia      Thyroid disease       Past Surgical History:   Procedure Laterality Date     AS HYSTEROSCOPY, SURGICAL; W/ ENDOMETRIAL ABLATION, ANY METHOD      [place]    [facility] [place]       COLONOSCOPY N/A 2021    Procedure: COLONOSCOPY;  Surgeon: Derek Oliva MD;  Location:  GI     DILATE CERVIX, HYSTEROSCOPY, ABLATE ENDOMETRIUM, COMBINED N/A 2020    Procedure: HYSTEROSCOPY, SEPTUM RESECTION, CERVICAL DILATION AND ENDOMETRIAL ABLATION;  Surgeon: Larry Vásquez MD;  Location:  OR     DILATION AND CURETTAGE, OPERATIVE HYSTEROSCOPY, COMBINED N/A 2020    Procedure: HYSTEROSCOPY,  DILATION AND CURETTAGE;  Surgeon: Yon Townsend MD;  Location:  OR     GYN SURGERY      ablation, tubal      SLING BLADDER SUSPENSION WITH FASCIA GREGG       OB History    Para Term  AB Living   5 4 4 0 1 4   SAB IAB Ectopic Multiple Live Births   1 0 0 0 4      # Outcome Date GA Lbr Edwin/2nd Weight Sex Delivery Anes PTL Lv   5 Term            4 Term            3 SAB            2 Term         AVERY   1 Term         AVERY       Review of Systems   Constitutional: Negative for chills and fever.   HENT: Negative for congestion, ear pain, hearing loss and sore throat.    Eyes: Negative for pain and visual disturbance.   Respiratory: Negative for cough and shortness of breath.    Cardiovascular: Negative for chest pain, palpitations and peripheral edema.   Gastrointestinal: Negative for abdominal pain, constipation, diarrhea, heartburn, hematochezia and nausea.   Breasts:  Negative for tenderness, breast mass and  "discharge.   Genitourinary: Negative for dysuria, frequency, genital sores, hematuria, pelvic pain, urgency, vaginal bleeding and vaginal discharge.   Musculoskeletal: Negative for arthralgias, joint swelling and myalgias.   Skin: Negative for rash.   Neurological: Negative for dizziness, weakness, headaches and paresthesias.   Psychiatric/Behavioral: Negative for mood changes. The patient is not nervous/anxious.         OBJECTIVE:   /78   Pulse 80   Temp 97.8  F (36.6  C)   Resp 18   Ht 1.791 m (5' 10.5\")   Wt 92.1 kg (203 lb)   SpO2 99%   BMI 28.72 kg/m    Physical Exam  GENERAL: healthy, alert and no distress  EYES: Eyes grossly normal to inspection, PERRL and conjunctivae and sclerae normal  HENT: ear canals and TM's normal, nose and mouth without ulcers or lesions  NECK: no adenopathy, no asymmetry, masses, or scars and thyroid normal to palpation  RESP: lungs clear to auscultation - no rales, rhonchi or wheezes  CV: regular rate and rhythm, normal S1 S2, no S3 or S4, no murmur, click or rub, no peripheral edema and peripheral pulses strong  ABDOMEN: soft, nontender, no hepatosplenomegaly, no masses and bowel sounds normal  MS: no gross musculoskeletal defects noted, no edema  SKIN: no suspicious lesions or rashes  NEURO: Normal strength and tone, mentation intact and speech normal  PSYCH: mentation appears normal, affect normal/bright    Diagnostic Test Results:  none     ASSESSMENT/PLAN:       1. Routine general medical examination at a health care facility    2. Migraine with aura and without status migrainosus, not intractable  controlled  - ondansetron (ZOFRAN) 4 MG tablet; TAKE 1 TABLET(4 MG) BY MOUTH EVERY 8 HOURS AS NEEDED FOR NAUSEA OR MIGRAINES Strength: 4 mg  Dispense: 30 tablet; Refill: 4  - SUMAtriptan (IMITREX) 25 MG tablet; TAKE 1 TO 2 TABLETS(25 TO 50 MG) BY MOUTH AT ONSET OF HEADACHE FOR MIGRAINE. MAY REPEAT IN 2 HOURS. MAX 8 TABLETS IN 24 HOURS  Dispense: 18 tablet; Refill: " "3    3. Hypothyroidism, unspecified type  - TSH; Future  - T4, free; Future  - TSH  - T4, free    4. Overweight (BMI 25.0-29.9)  discussed increasing intensity of workouts/adding some weight lifting, diet modifications discussed.  Will continue to monitor    5. CARDIOVASCULAR SCREENING; LDL GOAL LESS THAN 100  - Lipid Profile (Chol, Trig, HDL, LDL calc); Future  - Comprehensive metabolic panel (BMP + Alb, Alk Phos, ALT, AST, Total. Bili, TP); Future  - Lipid Profile (Chol, Trig, HDL, LDL calc)  - Comprehensive metabolic panel (BMP + Alb, Alk Phos, ALT, AST, Total. Bili, TP)            COUNSELING:  Reviewed preventive health counseling, as reflected in patient instructions       Regular exercise       Healthy diet/nutrition    Estimated body mass index is 28.72 kg/m  as calculated from the following:    Height as of this encounter: 1.791 m (5' 10.5\").    Weight as of this encounter: 92.1 kg (203 lb).    Weight management plan: Discussed healthy diet and exercise guidelines    She reports that she has never smoked. She has never used smokeless tobacco.      Counseling Resources:  ATP IV Guidelines  Pooled Cohorts Equation Calculator  Breast Cancer Risk Calculator  BRCA-Related Cancer Risk Assessment: FHS-7 Tool  FRAX Risk Assessment  ICSI Preventive Guidelines  Dietary Guidelines for Americans, 2010  USDA's MyPlate  ASA Prophylaxis  Lung CA Screening    EDWIN Leiva [facility]  "

## 2022-11-01 LAB
ALBUMIN SERPL-MCNC: 4.4 G/DL (ref 3.4–5)
ALP SERPL-CCNC: 94 U/L (ref 40–150)
ALT SERPL W P-5'-P-CCNC: 23 U/L (ref 0–50)
ANION GAP SERPL CALCULATED.3IONS-SCNC: 7 MMOL/L (ref 3–14)
AST SERPL W P-5'-P-CCNC: 19 U/L (ref 0–45)
BILIRUB SERPL-MCNC: 0.5 MG/DL (ref 0.2–1.3)
BUN SERPL-MCNC: 17 MG/DL (ref 7–30)
CALCIUM SERPL-MCNC: 9.6 MG/DL (ref 8.5–10.1)
CHLORIDE BLD-SCNC: 106 MMOL/L (ref 94–109)
CHOLEST SERPL-MCNC: 190 MG/DL
CO2 SERPL-SCNC: 25 MMOL/L (ref 20–32)
CREAT SERPL-MCNC: 0.63 MG/DL (ref 0.52–1.04)
FASTING STATUS PATIENT QL REPORTED: NO
GFR SERPL CREATININE-BSD FRML MDRD: >90 ML/MIN/1.73M2
GLUCOSE BLD-MCNC: 102 MG/DL (ref 70–99)
HDLC SERPL-MCNC: 65 MG/DL
LDLC SERPL CALC-MCNC: 104 MG/DL
NONHDLC SERPL-MCNC: 125 MG/DL
POTASSIUM BLD-SCNC: 4.4 MMOL/L (ref 3.4–5.3)
PROT SERPL-MCNC: 7.4 G/DL (ref 6.8–8.8)
SODIUM SERPL-SCNC: 138 MMOL/L (ref 133–144)
T4 FREE SERPL-MCNC: 0.9 NG/DL (ref 0.76–1.46)
TRIGL SERPL-MCNC: 103 MG/DL
TSH SERPL DL<=0.005 MIU/L-ACNC: 0.08 MU/L (ref 0.4–4)

## 2022-11-03 NOTE — RESULT ENCOUNTER NOTE
Antoinette-  Here are your recent results.     Your TSH is slightly LOW but your T4 is normal.  This can indicate that your dose of levothyroxine is too high.  Please follow up as scheduled with your endocrinologist for next steps.    Your LDL cholesterol and blood sugar are slightly elevated.  Eating a lower carbohydrate diet, regular exercise for 30-40 minutes most days of the week can help to improve this.  We can recheck these labs in 1 year.    Florian Garner PA-C

## 2022-11-21 ENCOUNTER — TRANSFERRED RECORDS (OUTPATIENT)
Dept: HEALTH INFORMATION MANAGEMENT | Facility: CLINIC | Age: 52
End: 2022-11-21

## 2022-11-21 ENCOUNTER — TELEPHONE (OUTPATIENT)
Dept: FAMILY MEDICINE | Facility: CLINIC | Age: 52
End: 2022-11-21

## 2022-11-21 NOTE — TELEPHONE ENCOUNTER
Nutrition Education Scheduling Outreach #1:    [employer] message sent to patient requesting to call to schedule.    [facility]  Diabetes and Nutrition Scheduling

## 2022-11-28 NOTE — TELEPHONE ENCOUNTER
Nutrition Education Scheduling Outreach #2:    Call to patient to schedule. Left message with phone number to call to schedule.        Brigitte Valles  [facility]  Diabetes and Nutrition Scheduling

## 2022-12-16 ENCOUNTER — TELEPHONE (OUTPATIENT)
Dept: FAMILY MEDICINE | Facility: CLINIC | Age: 52
End: 2022-12-16

## 2022-12-28 ENCOUNTER — MYC MEDICAL ADVICE (OUTPATIENT)
Dept: FAMILY MEDICINE | Facility: CLINIC | Age: 52
End: 2022-12-28
Payer: COMMERCIAL

## 2022-12-28 DIAGNOSIS — E03.9 HYPOTHYROIDISM, UNSPECIFIED TYPE: Primary | ICD-10-CM

## 2022-12-28 PROCEDURE — 99207 E-CONSULT TO ENDOCRINOLOGY (ADULT OUTPT PROVIDER TO SPECIALIST WRITTEN QUESTION & RESPONSE): CPT | Performed by: PHYSICIAN ASSISTANT

## 2022-12-30 NOTE — TELEPHONE ENCOUNTER
Pt notified of provider response via [employer].    Bettie ESCOTO RN  [facility]

## 2022-12-31 ENCOUNTER — E-CONSULT (OUTPATIENT)
Dept: ENDOCRINOLOGY | Facility: CLINIC | Age: 52
End: 2022-12-31
Payer: COMMERCIAL

## 2022-12-31 PROCEDURE — 99451 NTRPROF PH1/NTRNET/EHR 5/>: CPT

## 2022-12-31 NOTE — PROGRESS NOTES
"    12/31/2022     E-Consult has been accepted.    Interprofessional consultation requested by:  Florian Garner PA-C      Clinical Question/Purpose: MY CLINICAL QUESTION IS: Antoinette is currently treated with Cytomel and Synthroid for hypothyroidism. She was following with outside endocrinologist but recently requested to see Dr. Campa but cannot be seen until this spring. Recent TSH was 0.08 and T4 normal.  She has been having brain fog, weight gain, and poor concentration and constipation.  Would like to know how to manage thyroid meds at this time due to visit being far from now.     Patient assessment and information reviewed:   Care everywhere is not open sufficiently that I can see the endocrine records.  I can see she saw endocrinologist dr Trace Cole in 2008 and  2009 (possibly dating to 2000) with diagnosis \"hypothyroidism\".    I am unable to open his notes .    Selected TFTS:  4/11/00 TSH 1.5, T4 9.7, T3 uptake 0.94, free T4 9ndex 9.1 (4.5-12)  12/4/02 TSH 2.7, free T4 1  11/29/07 TSH 0.2? , T4 7.2 mcg/dl , t3 uptake 41%, free T4 index 3 (1.4-6)  11/3/08 TSH 0.3, free T4 1.4  10/31/22 TSH 0.08, free T4 0.9  I note there are multiple TSH levels in the [facility] system, none consistent with a diagnosis of hypothyroidism.     Med list  \"synthroid\" 137 mcg/day (1.48 mcg/kg/day)  Liothyronine (LT3)  10 mcg/day using 5 mcg tablets    Assessment:   1.  The available labs are consistent with a current diagnosis of iatrogenic subclinical thyrotoxicosis (low TSH, normal free T4) .  The thyrotoxicosis might be more overt, but we don't know the T3 level.   She is on combined LT4 /LT3. T3 has never been measured. Due to its short half life,T3 levels spike up and down during the day with each T3 tablet administration.  The higher the T3 dose , the higher the blood T3 spike.    The pretreatment labs for diagnosis of hypothyroidism are either not available or the diagnosis cannot be confirmed by available data.  "   2.  Brain fog - this is a nonspecific symptom which usually is not fixed by thyroid manipulation.  That being said, she is currently on excessive thyroid hormone, so perhaps that is playing a role.    3.  Postmenopausal by age.   Prolonged excessive thyroid hormone could be a risk to the bone and the heart. As per # 1.     Recommendations:    Due to its short half life, LT3 is not a once/day drug.  If she is taking it once/day it needs to be subdivided into at least twice/day dosing (ie 5 mcg twice/day instead of 10 mcg once/day).    You could consider to reduce either the LT4 or the T3 dose a bit. Specifically , you could reduce  the levothyroxine by 1/2 tablet per week, using the 137 mcg tablets as follows:  MON to SAT 1 tablet/day;  SUN 0.5 tablet.  Alternatively, you could reduce the LT3 to 2.5 mcg twice/day or 3 times/day.    Repeat labs in 3 months including total T3, free T4, TSH.    Set expectation that we don't expect to fix the brain fog with these changes.   Explore what is going on with her sleep.    Get mychart open so you can see her past endocrinologists notes.  Perhaps they will have valuable information.    The recommendations provided in this E-Consult are based on a review of clinical data pertinent to the clinical question presented, without a review of the patient's complete medical record or, the benefit of a comprehensive in-person or virtual patient evaluation. This consultation should not replace the clinical judgement and evaluation of the provider ordering this E-Consult. Any new clinical issues, or changes in patient status since the filing of this E-Consult will need to be taken into account when assessing these recommendations. Please contact me if you have further questions.    My total time spent reviewing clinical information and formulating assessment was 10 minutes.        Gosia Turner MD

## 2023-02-10 ENCOUNTER — NURSE TRIAGE (OUTPATIENT)
Dept: NURSING | Facility: CLINIC | Age: 53
End: 2023-02-10
Payer: COMMERCIAL

## 2023-02-11 NOTE — TELEPHONE ENCOUNTER
CCOVID Positive/Requesting COVID treatment    Patient is positive for COVID and requesting treatment options.    Date of positive COVID test (PCR or at home)? Home    Current COVID symptoms: fever or chills, cough, fatigue, muscle or body aches, headache, sore throat and congestion or runny nose  Date COVID symptoms began: 2/9/2023    Message should be routed to clinic RN pool. Best phone number to use for call back: [phone]       Britany Toribio RN        Reason for Disposition    [1] COVID-19 diagnosed by positive lab test (e.g., PCR, rapid self-test kit) AND [2] mild symptoms (e.g., cough, fever, others) AND [3] no complications or SOB    Additional Information    Negative: SEVERE difficulty breathing (e.g., struggling for each breath, speaks in single words)    Negative: Difficult to awaken or acting confused (e.g., disoriented, slurred speech)    Negative: Bluish (or gray) lips or face now    Negative: Shock suspected (e.g., cold/pale/clammy skin, too weak to stand, low BP, rapid pulse)    Negative: Sounds like a life-threatening emergency to the triager    Negative: SEVERE or constant chest pain or pressure  (Exception: Mild central chest pain, present only when coughing.)    Negative: MODERATE difficulty breathing (e.g., speaks in phrases, SOB even at rest, pulse 100-120)    Negative: Headache and stiff neck (can't touch chin to chest)    Negative: Oxygen level (e.g., pulse oximetry) 90 percent or lower    Negative: Chest pain or pressure  (Exception: MILD central chest pain, present only when coughing)    Negative: Patient sounds very sick or weak to the triager    Negative: MILD difficulty breathing (e.g., minimal/no SOB at rest, SOB with walking, pulse <100)    Negative: Fever > 103 F (39.4 C)    Negative: [1] Fever > 101 F (38.3 C) AND [2] over 60 years of age    Negative: [1] Fever > 100.0 F (37.8 C) AND [2] bedridden (e.g., nursing home patient, CVA, chronic illness, recovering from  surgery)    Negative: [1] HIGH RISK for severe COVID complications (e.g., weak immune system, age > 64 years, obesity with BMI of 30 or higher, pregnant, chronic lung disease or other chronic medical condition) AND [2] COVID symptoms (e.g., cough, fever)  (Exceptions: Already seen by PCP and no new or worsening symptoms.)    Negative: [1] HIGH RISK patient AND [2] influenza is widespread in the community AND [3] ONE OR MORE respiratory symptoms: cough, sore throat, runny or stuffy nose    Negative: [1] HIGH RISK patient AND [2] influenza exposure within the last 7 days AND [3] ONE OR MORE respiratory symptoms: cough, sore throat, runny or stuffy nose    Negative: Oxygen level (e.g., pulse oximetry) 91 to 94 percent    Negative: [1] COVID-19 infection suspected by caller or triager AND [2] mild symptoms (cough, fever, or others) AND [3] negative COVID-19 rapid test    Negative: Fever present > 3 days (72 hours)    Negative: [1] Fever returns after gone for over 24 hours AND [2] symptoms worse or not improved    Negative: [1] Continuous (nonstop) coughing interferes with work or school AND [2] no improvement using cough treatment per Care Advice    Negative: Cough present > 3 weeks    Negative: [1] COVID-19 diagnosed by positive lab test (e.g., PCR, rapid self-test kit) AND [2] NO symptoms (e.g., cough, fever, others)    Protocols used: CORONAVIRUS (COVID-19) DIAGNOSED OR [id number]

## 2023-02-13 ENCOUNTER — VIRTUAL VISIT (OUTPATIENT)
Dept: FAMILY MEDICINE | Facility: CLINIC | Age: 53
End: 2023-02-13
Payer: COMMERCIAL

## 2023-02-13 DIAGNOSIS — U07.1 INFECTION DUE TO 2019 NOVEL CORONAVIRUS: Primary | ICD-10-CM

## 2023-02-13 PROCEDURE — 99213 OFFICE O/P EST LOW 20 MIN: CPT | Mod: CS | Performed by: FAMILY MEDICINE

## 2023-02-13 NOTE — PROGRESS NOTES
Antoinette is a 52 year old who is being evaluated via a billable video visit.      How would you like to obtain your AVS? MyChart  If the video visit is dropped, the invitation should be resent by: Text to cell phone: [phone]  Will anyone else be joining your video visit? No        Assessment & Plan     Infection due to 2019 novel coronavirus    - nirmatrelvir and ritonavir (PAXLOVID) therapy pack; Take 3 tablets by mouth 2 times daily for 5 days      Recommending to start antiviral medication for COVID infection.  Her O2 sats are 95 to 94%.  Instructed to monitor that.  If it drops any further lower, instructed to go to nearest urgent care or ER for evaluation.  Stay hydrated.  Adequate nutrition and rest emphasized.           Return in about 5 days (around 2/18/2023) for If symptoms are not improving.    Chano Kaufman MD  [facility]    Dwayne Curry is a 52 year old, presenting for the following health issues:  Covid Concern (Test positive 2/10/23)      HPI       COVID-19 Symptom Review  How many days ago did these symptoms start? 2/10  Tested positive on 2/10/23  Are any of the following symptoms significant for you?    New or worsening difficulty breathing? No    Worsening cough? Yes, it's a dry cough.     Fever or chills? Yes, I felt feverish or had chills.    Headache: YES    Sore throat: YES    Chest pain: YES- in the evenings and lying down     Diarrhea: No    Body aches? YES    What treatments has patient tried? Acetaminophen and Nyquil and Ibuprofen at night , Also has been using daughters albuterol   Does patient live in a nursing home, group home, or shelter? No  Does patient have a way to get food/medications during quarantined? Yes, I have a friend or family member who can help me.            Review of Systems   INTEGUMENTARY/SKIN: NEGATIVE for worrisome rashes, moles or lesions  GI: NEGATIVE for nausea, abdominal pain, heartburn, or change in bowel habits       Objective           Vitals:  No vitals were obtained today due to virtual visit.    Physical Exam   GENERAL: Healthy, alert and no distress  EYES: Eyes grossly normal to inspection.  No discharge or erythema, or obvious scleral/conjunctival abnormalities.  RESP: No audible wheeze, cough, or visible cyanosis.  No visible retractions or increased work of breathing.    SKIN: Visible skin clear. No significant rash, abnormal pigmentation or lesions.  NEURO: Cranial nerves grossly intact.  Mentation and speech appropriate for age.  PSYCH: Mentation appears normal, affect normal/bright, judgement and insight intact, normal speech and appearance well-groomed.                Video-Visit Details    Type of service:  Video Visit     Originating Location (pt. Location): Home  Distant Location (provider location):  On-site  Platform used for Video Visit: [email or web address]

## 2023-02-13 NOTE — TELEPHONE ENCOUNTER
RN COVID TREATMENT VISIT  02/13/23    Antoinette Deleon  52 year old  Current weight? 203    Has the patient been seen by a primary care provider at an [facility] or [facility] within the past two years? Yes.   Have you been in close proximity to/do you have a known exposure to a person with a confirmed case of influenza? No.     Date of positive COVID test (PCR or at home)?  2/10    Current COVID symptoms: cough, fatigue, muscle or body aches and headache    Date COVID symptoms began: 2/9    Do you have any of the following conditions that place you at risk of being very sick from COVID-19? No high risk conditions    Is patient eligible to continue? No, Pt does not qualify for RN standing protocol Paxlovid as she does not have any high risk conditions. Pt scheduled for VV to discuss Tx options.     Pt scheduled for VV with Dr Kaufman today at 1230. Separate My Chart message sent to pt with Patient Covid Instructions    Morena Santamaria RN

## 2023-03-02 NOTE — TELEPHONE ENCOUNTER
DIAGNOSIS: Hypothyroidism, unspecified type    DATE RECEIVED: 05.24.2023    NOTES (FOR ALL VISITS) STATUS DETAILS   OFFICE NOTES from referring provider Internal 12.12.2022 Florian Garner PA-C   OFFICE NOTES from other specialist     ED NOTES     OPERATIVE REPORT  (thyroid, pituitary, adrenal, parathyroid)     MEDICATION LIST Internal    IMAGING      DEXASCAN     MRI (BRAIN)     XR (Chest)     CT (HEAD/NECK/CHEST/ABDOMEN)     NUCLEAR      ULTRASOUND (HEAD/NECK)     LABS     DIABETES: HBGA1C, CREATININE, FASTING LIPIDS, MICROALBUMIN URINE, POTASSIUM, TSH, T4    THYROID: TSH, T4, CBC, THYRODLONULIN, TOTAL T3, FREE T4, CALCITONIN, CEA Internal   10.31.2022 TSH, Lipids, T4  07.29.2019 CBC

## 2023-05-02 ENCOUNTER — PATIENT OUTREACH (OUTPATIENT)
Dept: OBGYN | Facility: CLINIC | Age: 53
End: 2023-05-02
Payer: COMMERCIAL

## 2023-05-24 ENCOUNTER — PRE VISIT (OUTPATIENT)
Dept: ENDOCRINOLOGY | Facility: CLINIC | Age: 53
End: 2023-05-24

## 2023-05-26 ENCOUNTER — LAB REQUISITION (OUTPATIENT)
Dept: LAB | Facility: CLINIC | Age: 53
End: 2023-05-26
Payer: COMMERCIAL

## 2023-05-26 DIAGNOSIS — Z78.0 ASYMPTOMATIC MENOPAUSAL STATE: ICD-10-CM

## 2023-05-26 DIAGNOSIS — Z01.419 ENCOUNTER FOR GYNECOLOGICAL EXAMINATION (GENERAL) (ROUTINE) WITHOUT ABNORMAL FINDINGS: ICD-10-CM

## 2023-05-26 LAB — FSH SERPL IRP2-ACNC: 78.1 MIU/ML

## 2023-05-26 PROCEDURE — 87624 HPV HI-RISK TYP POOLED RSLT: CPT | Mod: ORL | Performed by: OBSTETRICS & GYNECOLOGY

## 2023-05-26 PROCEDURE — 83001 ASSAY OF GONADOTROPIN (FSH): CPT | Mod: ORL | Performed by: OBSTETRICS & GYNECOLOGY

## 2023-05-26 PROCEDURE — G0145 SCR C/V CYTO,THINLAYER,RESCR: HCPCS | Mod: ORL | Performed by: OBSTETRICS & GYNECOLOGY

## 2023-05-31 LAB
BKR LAB AP GYN ADEQUACY: NORMAL
BKR LAB AP GYN INTERPRETATION: NORMAL
BKR LAB AP HPV REFLEX: NORMAL
BKR LAB AP LMP: NORMAL
BKR LAB AP PREVIOUS ABNL DX: NORMAL
BKR LAB AP PREVIOUS ABNORMAL: NORMAL
PATH REPORT.COMMENTS IMP SPEC: NORMAL
PATH REPORT.COMMENTS IMP SPEC: NORMAL
PATH REPORT.RELEVANT HX SPEC: NORMAL

## 2023-05-31 NOTE — TELEPHONE ENCOUNTER
5/26/23 Pap done outside [place]. Will await final results to update chart before ending tracking

## 2023-06-01 ENCOUNTER — TRANSFERRED RECORDS (OUTPATIENT)
Dept: MULTI SPECIALTY CLINIC | Facility: CLINIC | Age: 53
End: 2023-06-01

## 2023-06-04 LAB
HUMAN PAPILLOMA VIRUS 16 DNA: NEGATIVE
HUMAN PAPILLOMA VIRUS 18 DNA: NEGATIVE
HUMAN PAPILLOMA VIRUS FINAL DIAGNOSIS: NORMAL
HUMAN PAPILLOMA VIRUS OTHER HR: NEGATIVE

## 2023-08-27 ENCOUNTER — HEALTH MAINTENANCE LETTER (OUTPATIENT)
Age: 53
End: 2023-08-27

## 2023-09-17 ENCOUNTER — E-VISIT (OUTPATIENT)
Dept: URGENT CARE | Facility: CLINIC | Age: 53
End: 2023-09-17
Payer: COMMERCIAL

## 2023-09-17 DIAGNOSIS — H00.15 CHALAZION OF LEFT LOWER EYELID: Primary | ICD-10-CM

## 2023-09-17 PROCEDURE — 99421 OL DIG E/M SVC 5-10 MIN: CPT | Performed by: FAMILY MEDICINE

## 2023-09-17 RX ORDER — ERYTHROMYCIN 5 MG/G
OINTMENT OPHTHALMIC
Qty: 3.5 G | Refills: 0 | Status: SHIPPED | OUTPATIENT
Start: 2023-09-17 | End: 2023-09-24

## 2023-09-17 NOTE — PATIENT INSTRUCTIONS
Thank you for choosing us for your care. I have placed an order for a prescription so that you can start treatment. View your full visit summary for details by clicking on the link below. Your pharmacist will able to address any questions you may have about the medication.     If you re not feeling better within 2-3 days, please schedule an appointment.  You can schedule an appointment right here in [facility], or call [phone]  If the visit is for the same symptoms as your eVisit, we ll refund the cost of your eVisit if seen within seven days.

## 2024-01-22 ENCOUNTER — TRANSFERRED RECORDS (OUTPATIENT)
Dept: HEALTH INFORMATION MANAGEMENT | Facility: CLINIC | Age: 54
End: 2024-01-22
Payer: COMMERCIAL

## 2024-02-27 ENCOUNTER — NURSE TRIAGE (OUTPATIENT)
Dept: FAMILY MEDICINE | Facility: CLINIC | Age: 54
End: 2024-02-27

## 2024-02-27 ENCOUNTER — OFFICE VISIT (OUTPATIENT)
Dept: FAMILY MEDICINE | Facility: CLINIC | Age: 54
End: 2024-02-27
Payer: COMMERCIAL

## 2024-02-27 VITALS
BODY MASS INDEX: 31.21 KG/M2 | HEIGHT: 71 IN | SYSTOLIC BLOOD PRESSURE: 122 MMHG | OXYGEN SATURATION: 96 % | WEIGHT: 222.9 LBS | RESPIRATION RATE: 20 BRPM | TEMPERATURE: 97.3 F | DIASTOLIC BLOOD PRESSURE: 83 MMHG | HEART RATE: 99 BPM

## 2024-02-27 DIAGNOSIS — M54.12 CERVICAL RADICULOPATHY: ICD-10-CM

## 2024-02-27 DIAGNOSIS — M54.16 LUMBAR RADICULOPATHY: Primary | ICD-10-CM

## 2024-02-27 PROCEDURE — 90715 TDAP VACCINE 7 YRS/> IM: CPT | Performed by: PHYSICIAN ASSISTANT

## 2024-02-27 PROCEDURE — 99213 OFFICE O/P EST LOW 20 MIN: CPT | Mod: 25 | Performed by: PHYSICIAN ASSISTANT

## 2024-02-27 PROCEDURE — 90471 IMMUNIZATION ADMIN: CPT | Performed by: PHYSICIAN ASSISTANT

## 2024-02-27 RX ORDER — PREDNISONE 20 MG/1
40 TABLET ORAL DAILY
Qty: 10 TABLET | Refills: 0 | Status: SHIPPED | OUTPATIENT
Start: 2024-02-27 | End: 2024-05-24

## 2024-02-27 ASSESSMENT — PAIN SCALES - GENERAL: PAINLEVEL: SEVERE PAIN (7)

## 2024-02-27 NOTE — PATIENT INSTRUCTIONS
Take prednisone in the am with food    Exercises as discussed: knee to chest, nerve glides, child's pose, cat/cow    Spine referral

## 2024-02-27 NOTE — TELEPHONE ENCOUNTER
"Nurse Triage SBAR    Is this a 2nd Level Triage? YES, LICENSED PRACTITIONER REVIEW IS REQUIRED    Situation: Patient reports 10/10 numbness down the back of her left leg since noon yesterday. Tingling down back of left arm from shoulders to fingers that started 7 pm yesterday. Patient states that she had an aggressive massage on Sunday morning. So far has taken ibuprofen and iced.     Patient denies any other symptoms. States that she is able to walk normally and use her left arm normally.     Background: No history of hypertension or blood clots. States that she has never had such a deep massage that worked on her upper back and hips.     Assessment: Numbness and tingling possibly due to pinched or aggravated nerves.      Protocol Recommended Disposition:   Callback By PCP Within 1 Hour    Recommendation: Patient is scheduled at 10:40 this morning at [place] for evaluation. Please advise if should keep this appointment or continue home measures for improvement for now.      Routed to provider    Does the patient meet one of the following criteria for ADS visit consideration? 16+ years old, with an [facility] PCP     TIP  Providers, please consider if this condition is appropriate for management at one of our Acute and Diagnostic Services sites.     If patient is a good candidate, please use dotphrase <dot>triageresponse and select Refer to ADS to document.      Reason for Disposition   Nursing judgment    Additional Information   Negative: Sounds like a life-threatening emergency to the triager   Negative: Information only call about a Well Adult (no illness or injury)   Negative: Caller can't be reached by phone   Negative: Nursing judgment   Negative: Nursing judgment   Negative: Nursing judgment    Answer Assessment - Initial Assessment Questions  1. REASON FOR CALL: \"What is your main concern right now?\"      Numbness back of left leg from buttocks to knee. Tingling back of right arm from shoulder to fingers. Patient " "reports aggressive massage on Sunday morning that focused on left upper back and hips.  2. ONSET: \"When did the numbness and tingling start?\"      Left leg numbness started yesterday noon. Left arm tingling started about 7 pm last evening.   3. SEVERITY: \"How bad is the numbness and tingling?\"      Left leg numbness is 10/10. Arm numbness is 6-7/10  4. FEVER: \"Do you have a fever?\"      no  5. OTHER SYMPTOMS: \"Do you have any other new symptoms?\"      No other symptoms. Denies weakness. Able to walk normally and use left arm normally.   6. TREATMENTS AND RESPONSE: \"What have you done so far to try to make this better? What medicines have you used?\"      Took ibuprofen 600 mg last night. Did ice hip and upper back and neck both on the left side.   7. PREGNANCY: \"Is there any chance you are pregnant?\" \"When was your last menstrual period?\"      NA    Protocols used: No Protocol [id number]  Melissa Ch RN    "

## 2024-02-27 NOTE — TELEPHONE ENCOUNTER
Appointments in Next Year      Feb 27, 2024 11:00 AM  (Arrive by 10:40 AM)  Provider Visit with Cher Cooper PA-C  [facility] ([facility] [place] ) [phone]          Called patient and notified of PCP response below    Keysha Salmon RN  [facility]
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
## 2024-02-27 NOTE — NURSING NOTE
Prior to immunization administration, verified patients identity using patient s name and date of birth. Please see Immunization Activity for additional information.     Screening Questionnaire for Adult Immunization    Are you sick today?   No   Do you have allergies to medications, food, a vaccine component or latex?   No   Have you ever had a serious reaction after receiving a vaccination?   No   Do you have a long-term health problem with heart, lung, kidney, or metabolic disease (e.g., diabetes), asthma, a blood disorder, no spleen, complement component deficiency, a cochlear implant, or a spinal fluid leak?  Are you on long-term aspirin therapy?   No   Do you have cancer, leukemia, HIV/AIDS, or any other immune system problem?   No   Do you have a parent, brother, or sister with an immune system problem?   No   In the past 3 months, have you taken medications that affect  your immune system, such as prednisone, other steroids, or anticancer drugs; drugs for the treatment of rheumatoid arthritis, Crohn s disease, or psoriasis; or have you had radiation treatments?   No   Have you had a seizure, or a brain or other nervous system problem?   No   During the past year, have you received a transfusion of blood or blood    products, or been given immune (gamma) globulin or antiviral drug?   No   For women: Are you pregnant or is there a chance you could become       pregnant during the next month?   No   Have you received any vaccinations in the past 4 weeks?   No     Immunization questionnaire answers were all negative.    Pt receiving Tdap.      Patient instructed to remain in clinic for 15 minutes afterwards, and to report any adverse reactions.     Screening performed by Carter Carr MA on 2/27/2024 at 11:22 AM.

## 2024-03-05 ENCOUNTER — THERAPY VISIT (OUTPATIENT)
Dept: PHYSICAL THERAPY | Facility: CLINIC | Age: 54
End: 2024-03-05
Payer: COMMERCIAL

## 2024-03-05 DIAGNOSIS — M54.12 CERVICAL RADICULOPATHY: ICD-10-CM

## 2024-03-05 DIAGNOSIS — M54.16 LUMBAR RADICULOPATHY: Primary | ICD-10-CM

## 2024-03-05 PROCEDURE — 97110 THERAPEUTIC EXERCISES: CPT | Mod: GP | Performed by: PHYSICAL THERAPIST

## 2024-03-05 PROCEDURE — 97161 PT EVAL LOW COMPLEX 20 MIN: CPT | Mod: GP | Performed by: PHYSICAL THERAPIST

## 2024-03-05 PROCEDURE — 97140 MANUAL THERAPY 1/> REGIONS: CPT | Mod: GP | Performed by: PHYSICAL THERAPIST
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
## 2024-03-13 ENCOUNTER — THERAPY VISIT (OUTPATIENT)
Dept: PHYSICAL THERAPY | Facility: CLINIC | Age: 54
End: 2024-03-13
Payer: COMMERCIAL

## 2024-03-13 DIAGNOSIS — M54.16 LUMBAR RADICULOPATHY: Primary | ICD-10-CM

## 2024-03-13 DIAGNOSIS — M54.12 CERVICAL RADICULOPATHY: ICD-10-CM

## 2024-03-13 PROCEDURE — 97110 THERAPEUTIC EXERCISES: CPT | Mod: GP | Performed by: PHYSICAL THERAPIST

## 2024-03-13 PROCEDURE — 97140 MANUAL THERAPY 1/> REGIONS: CPT | Mod: GP | Performed by: PHYSICAL THERAPIST

## 2024-04-05 ENCOUNTER — THERAPY VISIT (OUTPATIENT)
Dept: PHYSICAL THERAPY | Facility: CLINIC | Age: 54
End: 2024-04-05
Payer: COMMERCIAL

## 2024-04-05 DIAGNOSIS — M54.12 CERVICAL RADICULOPATHY: ICD-10-CM

## 2024-04-05 DIAGNOSIS — M54.16 LUMBAR RADICULOPATHY: Primary | ICD-10-CM

## 2024-04-05 PROCEDURE — 97140 MANUAL THERAPY 1/> REGIONS: CPT | Mod: GP | Performed by: PHYSICAL THERAPIST

## 2024-04-05 PROCEDURE — 97110 THERAPEUTIC EXERCISES: CPT | Mod: GP | Performed by: PHYSICAL THERAPIST

## 2024-04-05 PROCEDURE — 97530 THERAPEUTIC ACTIVITIES: CPT | Mod: GP | Performed by: PHYSICAL THERAPIST

## 2024-04-19 ENCOUNTER — THERAPY VISIT (OUTPATIENT)
Dept: PHYSICAL THERAPY | Facility: CLINIC | Age: 54
End: 2024-04-19
Payer: COMMERCIAL

## 2024-04-19 DIAGNOSIS — M54.12 CERVICAL RADICULOPATHY: ICD-10-CM

## 2024-04-19 DIAGNOSIS — M54.16 LUMBAR RADICULOPATHY: Primary | ICD-10-CM

## 2024-04-19 PROCEDURE — 97140 MANUAL THERAPY 1/> REGIONS: CPT | Mod: GP | Performed by: PHYSICAL THERAPIST

## 2024-04-19 PROCEDURE — 97530 THERAPEUTIC ACTIVITIES: CPT | Mod: GP | Performed by: PHYSICAL THERAPIST

## 2024-04-19 PROCEDURE — 97110 THERAPEUTIC EXERCISES: CPT | Mod: GP | Performed by: PHYSICAL THERAPIST

## 2024-05-09 ENCOUNTER — OFFICE VISIT (OUTPATIENT)
Dept: FAMILY MEDICINE | Facility: CLINIC | Age: 54
End: 2024-05-09
Payer: COMMERCIAL

## 2024-05-09 DIAGNOSIS — D49.2 NEOPLASM OF SKIN: ICD-10-CM

## 2024-05-09 DIAGNOSIS — D23.9 DERMATOFIBROMA: ICD-10-CM

## 2024-05-09 DIAGNOSIS — L81.4 LENTIGINES: ICD-10-CM

## 2024-05-09 DIAGNOSIS — L82.1 SEBORRHEIC KERATOSIS: ICD-10-CM

## 2024-05-09 DIAGNOSIS — D22.9 MULTIPLE BENIGN NEVI: Primary | ICD-10-CM

## 2024-05-09 DIAGNOSIS — D18.01 CHERRY ANGIOMA: ICD-10-CM

## 2024-05-09 PROCEDURE — 11103 TANGNTL BX SKIN EA SEP/ADDL: CPT | Performed by: PHYSICIAN ASSISTANT

## 2024-05-09 PROCEDURE — 88305 TISSUE EXAM BY PATHOLOGIST: CPT | Performed by: DERMATOLOGY

## 2024-05-09 PROCEDURE — 11102 TANGNTL BX SKIN SINGLE LES: CPT | Performed by: PHYSICIAN ASSISTANT

## 2024-05-09 PROCEDURE — 99213 OFFICE O/P EST LOW 20 MIN: CPT | Mod: 25 | Performed by: PHYSICIAN ASSISTANT

## 2024-05-09 ASSESSMENT — PAIN SCALES - GENERAL: PAINLEVEL: NO PAIN (0)

## 2024-05-09 NOTE — PATIENT INSTRUCTIONS
Patient Education       Proper skin care from [facility]:    -Eliminate harsh soaps as they strip the natural oils from the skin, often resulting in dry itchy skin ( i.e. Dial, Zest, [place])  -Use mild soaps such as Cetaphil or Dove Sensitive Skin in the shower. You do not need to use soap on arms, legs, and trunk every time you shower unless visibly soiled.   -Avoid hot or cold showers.  -After showering, lightly dry off and apply moisturizing within 2-3 minutes. This will help trap moisture in the skin.   -Aggressive use of a moisturizer at least 1-2 times a day to the entire body (including -Vanicream, Cetaphil, Aquaphor or Cerave) and moisturize hands after every washing.  -We recommend using moisturizers that come in a tub that needs to be scooped out, not a pump. This has more of an oil base. It will hold moisture in your skin much better than a water base moisturizer. The above recommended are non-pore clogging.      Wear a sunscreen with at least SPF 30 on your face, ears, neck and V of the chest daily. Wear sunscreen on other areas of the body if those areas are exposed to the sun throughout the day. Sunscreens can contain physical and/or chemical blockers. Physical blockers are less likely to clog pores, these include zinc oxide and titanium dioxide. Reapply every two hour and after swimming.     Sunscreen examples: https://www.ewg.org/sunscreen/    UV radiation  UVA radiation remains constant throughout the day and throughout the year. It is a longer wavelength than UVB and therefore penetrates deeper into the skin leading to immediate and delayed tanning, photoaging, and skin cancer. 70-80% of UVA and UVB radiation occurs between the hours of 10am-2pm.  UVB radiation  UVB radiation causes the most harmful effects and is more significant during the summer months. However, snow and ice can reflect UVB radiation leading to skin damage during the winter months as well. UVB radiation is  responsible for tanning, burning, inflammation, delayed erythema (pinkness), pigmentation (brown spots), and skin cancer.     I recommend self monthly full body exams and yearly full body exams with a dermatology provider. If you develop a new or changing lesion please follow up for examination. Most skin cancers are pink and scaly or pink and pearly. However, we do see blue/brown/black skin cancers.  Consider the ABCDEs of melanoma when giving yourself your monthly full body exam ( don't forget the groin, buttocks, feet, toes, etc). A-asymmetry, B-borders, C-color, D-diameter, E-elevation or evolving. If you see any of these changes please follow up in clinic. If you cannot see your back I recommend purchasing a hand held mirror to use with a larger wall mirror.       Checking for Skin Cancer  You can find cancer early by checking your skin each month. There are 3 kinds of skin cancer. They are melanoma, basal cell carcinoma, and squamous cell carcinoma. Doing monthly skin checks is the best way to find new marks or skin changes. Follow the instructions below for checking your skin.   The ABCDEs of checking moles for melanoma   Check your moles or growths for signs of melanoma using ABCDE:   Asymmetry: the sides of the mole or growth don t match  Border: the edges are ragged, notched, or blurred  Color: the color within the mole or growth varies  Diameter: the mole or growth is larger than 6 mm (size of a pencil eraser)  Evolving: the size, shape, or color of the mole or growth is changing (evolving is not shown in the images below)    Checking for other types of skin cancer  Basal cell carcinoma or squamous cell carcinoma have symptoms such as:     A spot or mole that looks different from all other marks on your skin  Changes in how an area feels, such as itching, tenderness, or pain  Changes in the skin's surface, such as oozing, bleeding, or scaliness  A sore that does not heal  New swelling or redness beyond  the border of a mole    Who s at risk?  Anyone can get skin cancer. But you are at greater risk if you have:   Fair skin, light-colored hair, or light-colored eyes  Many moles or abnormal moles on your skin  A history of sunburns from sunlight or tanning beds  A family history of skin cancer  A history of exposure to radiation or chemicals  A weakened immune system  If you have had skin cancer in the past, you are at risk for recurring skin cancer.   How to check your skin  Do your monthly skin checkups in front of a full-length mirror. Check all parts of your body, including your:   Head (ears, face, neck, and scalp)  Torso (front, back, and sides)  Arms (tops, undersides, upper, and lower armpits)  Hands (palms, backs, and fingers, including under the nails)  Buttocks and genitals  Legs (front, back, and sides)  Feet (tops, soles, toes, including under the nails, and between toes)  If you have a lot of moles, take digital photos of them each month. Make sure to take photos both up close and from a distance. These can help you see if any moles change over time.   Most skin changes are not cancer. But if you see any changes in your skin, call your doctor right away. Only he or she can diagnose a problem. If you have skin cancer, seeing your doctor can be the first step toward getting the treatment that could save your life.   [employer] last reviewed this educational content on 4/1/2019 2000-2020 [employer]. [address] [place] [address], [place] [address]. All rights reserved. This information is not intended as a substitute for professional medical care. Always follow your healthcare professional's instructions.       Wound Care After a Biopsy    What is a skin biopsy?  A skin biopsy allows the doctor to examine a very small piece of tissue under the microscope to determine the diagnosis and the best treatment for the skin condition. A local anesthetic (numbing medicine)  is injected with a very small  needle into the skin area to be tested. A small piece of skin is taken from the area. Sometimes a suture (stitch) is used.     What are the risks of a skin biopsy?  I will experience scar, bleeding, swelling, pain, crusting and redness. I may experience incomplete removal or recurrence. Risks of this procedure are excessive bleeding, bruising, infection, nerve damage, numbness, thick (hypertrophic or keloidal) scar and non-diagnostic biopsy.    How should I care for my wound for the first 24 hours?  Keep the wound dry and covered for 24 hours  If it bleeds, hold direct pressure on the area for 15 minutes. If bleeding does not stop then go to the emergency room  Avoid strenuous exercise the first 1-2 days or as your doctor instructs you    How should I care for the wound after 24 hours?  After 24 hours, remove the bandage  You may bathe or shower as normal  If you had a scalp biopsy, you can shampoo as usual and can use shower water to clean the biopsy site daily  Clean the wound twice a day with gentle soap and water  Do not scrub, be gentle  Apply white petroleum/Vaseline after cleaning the wound with a cotton swab or a clean finger, and keep the site covered with a Bandaid /bandage. Bandages are not necessary with a scalp biopsy  If you are unable to cover the site with a Bandaid /bandage, re-apply ointment 2-3 times a day to keep the site moist. Moisture will help with healing  Avoid strenuous activity for first 1-2 days  Avoid lakes, rivers, pools, and oceans until the stitches are removed or the site is healed    How do I clean my wound?  Wash hands thoroughly with soap or use hand  before all wound care  Clean the wound with gentle soap and water  Apply white petroleum/Vaseline  to wound after it is clean  Replace the Bandaid /bandage to keep the wound covered for the first few days or as instructed by your doctor  If you had a scalp biopsy, warm shower water to the area on a daily basis should  suffice    What should I use to clean my wound?   Cotton-tipped applicators (Qtips )  White petroleum jelly (Vaseline ). Use a clean new container and use Q-tips to apply.  Bandaids   as needed  Gentle soap     How should I care for my wound long term?  Do not get your wound dirty  Keep up with wound care for one week or until the area is healed.  A small scab will form and fall off by itself when the area is completely healed. The area will be red and will become pink in color as it heals. Sun protection is very important for how your scar will turn out. Sunscreen with an SPF 30 or greater is recommended once the area is healed.  If you have stitches, stitches need to be removed in 10 days. You may return to our clinic for this or you may have it done locally at your doctor s office.  You should have some soreness but it should be mild and slowly go away over several days. Talk to your doctor about using tylenol for pain,    When should I call my doctor?  If you have increased:   Pain or swelling  Pus or drainage (clear or slightly yellow drainage is ok)  Temperature over 100F  Spreading redness or warmth around wound    When will I hear about my results?  The biopsy results can take 2-3 weeks to come back. The clinic will call you with the results, send you a [employer] message, or have you schedule a follow-up clinic or phone time to discuss the results. Contact our clinics if you do not hear from us in 3 weeks.     Who should I call with questions?  [facility]: [phone]   [facility]: [phone]  For urgent needs outside of business hours call the [facility] at [phone] and ask for the dermatology resident on call

## 2024-05-09 NOTE — PROGRESS NOTES
[facility] Dermatology Note  Encounter Date: May 9, 2024  Office Visit      Dermatology Problem List:  FBSE: 5/9/24    #NUB x 2,5/9/24: Pending results.   1. AK s/p cryo   ____________________________________________    Assessment & Plan:  # Neoplasm of unspecified behavior of the skin (D49.2) on the L scapular back. The differential diagnosis includes NMSC vs other. .   - Shave biopsy performed today, see procedure note below.   - Photographed today     # Neoplasm of unspecified behavior of the skin (D49.2) on the L chest. The differential diagnosis includes NMSC vs other. .   - Shave biopsy performed today, see procedure note below.   - Photographed today    # Benign findings: multiple benign nevi, lentigines, cherry angiomas, SKs  - edu on benign etiology  - Signs and Symptoms of non-melanoma skin cancer and ABCDEs of melanoma reviewed with patient. Patient encouraged to perform monthly self skin exams and educated on how to perform them. UV precautions reviewed with patient. Patient was asked about new or changing moles/lesions on body.   - Sunscreen: Apply 20 minutes prior to going outdoors and reapply every two hours, when wet or sweating. We recommend using an SPF 30 or higher, and to use one that is water resistant.     - RTC for changes      Procedures Performed:   - Shave biopsy x2 procedure note, location(s): see above. After discussion of benefits and risks including but not limited to bleeding, infection, scar, incomplete removal, recurrence, and non-diagnostic biopsy, written consent and photographs were obtained. The area was cleaned with isopropyl alcohol. 0.5mL of 1% lidocaine with epinephrine was injected to obtain adequate anesthesia of lesion(s). Shave biopsy at site(s) performed. Hemostasis was achieved with aluminium chloride. Petrolatum ointment and a sterile dressing were applied. The patient tolerated the procedure and no complications were noted. The patient was provided  with verbal and written post care instructions.      Follow-up: pending path results    Staff and scribe:    Scribe Disclosure:   I, HUMBERTO PENA, am serving as a scribe; to document services personally performed by Lynn Soares PA-C -based on data collection and the provider's statements to me.     Provider Disclosure:  I agree with above History, Review of Systems, Physical exam and Plan.  I have reviewed the content of the documentation and have edited it as needed. I have personally performed the services documented here and the documentation accurately represents those services and the decisions I have made.      Electronically signed by:    All risks, benefits and alternatives were discussed with patient.  Patient is in agreement and understands the assessment and plan.  All questions were answered.    Lynn Soares PA-C, [facility]  [facility]: Phone: [phone], Fax: [phone]  [facility]: Phone: [phone],  Fax: [phone]  [facility]: Phone: [phone], Fax: [phone]  ____________________________________________    CC: Skin Check      Reviewed patients past medical history and pertinent chart review prior to patient's visit today.     HPI:  Ms. Antoinette Deleon is a 53 year old female who presents today as a new patient for FBSC. Last seen in 2019 for FBSE with Isa Cho PA-C.     Today patient reported a spot of concern on her back and a new spot on the chest.      Has a hx of AK's    Patient is otherwise feeling well, without additional concerns.    Labs:  N/A    Physical Exam:  Vitals: There were no vitals taken for this visit.  SKIN: Full skin, which includes the head/face, both arms, chest, back, abdomen,both legs, genitalia and/or groin buttocks, digits and/or nails, was examined.   - - Arguello's skin type II, has <100 nevi  - There are dome shaped bright red  papules on the trunk.   - Multiple regular brown pigmented macules and papules are identified on the trunk and extremities.   - Scattered brown macules on sun exposed areas.  - There are waxy stuck on tan to brown papules on the trunk.    - L Chest there is a 5 mm erythematous shinny papule   - L scapular back there is a 5 mm pink papule    - No other lesions of concern on areas examined.          L chest          Medications:  Current Outpatient Medications   Medication Sig Dispense Refill    Ascorbic Acid (VITAMIN C) 100 MG CHEW       cholecalciferol (VITAMIN D) 1000 UNIT tablet Take 1,000 Units by mouth daily       levothyroxine (SYNTHROID/LEVOTHROID) 125 MCG tablet Take 125 mcg by mouth daily Dose reduced to 125 mcg per Endocrinology      liothyronine (CYTOMEL) 5 MCG tablet Take 5 mcg by mouth daily Dose reduced to 5 mcg daily.      multivitamin w/minerals (THERA-VIT-M) tablet Take 1 tablet by mouth daily       ondansetron (ZOFRAN) 4 MG tablet TAKE 1 TABLET(4 MG) BY MOUTH EVERY 8 HOURS AS NEEDED FOR NAUSEA OR MIGRAINES Strength: 4 mg 30 tablet 4    SUMAtriptan (IMITREX) 25 MG tablet TAKE 1 TO 2 TABLETS(25 TO 50 MG) BY MOUTH AT ONSET OF HEADACHE FOR MIGRAINE. MAY REPEAT IN 2 HOURS. MAX 8 TABLETS IN 24 HOURS 18 tablet 3    predniSONE (DELTASONE) 20 MG tablet Take 2 tablets (40 mg) by mouth daily 10 tablet 0     No current facility-administered medications for this visit.      Past Medical/Surgical History:   Patient Active Problem List   Diagnosis    Hypothyroidism    Migraine with aura    Cervical high risk HPV (human papillomavirus) test positive    Dysfunctional uterine bleeding    DUB (dysfunctional uterine bleeding)    Pain of left forearm    Lumbar radiculopathy    Cervical radiculopathy     Past Medical History:   Diagnosis Date    Cervical high risk HPV (human papillomavirus) test positive 11/28/2018    Menorrhagia     Thyroid disease

## 2024-05-09 NOTE — LETTER
5/9/2024         RE: Antoinette Deleon  [address]   [place] [address]        Dear Colleague,    Thank you for referring your patient, Antoinette Deleon, to the [facility]. Please see a copy of my visit note below.    [facility] Dermatology Note  Encounter Date: May 9, 2024  Office Visit      Dermatology Problem List:  FBSE: 5/9/24    #NUB x 2,5/9/24: Pending results.   1. AK s/p cryo   ____________________________________________    Assessment & Plan:  # Neoplasm of unspecified behavior of the skin (D49.2) on the L scapular back. The differential diagnosis includes NMSC vs other. .   - Shave biopsy performed today, see procedure note below.   - Photographed today     # Neoplasm of unspecified behavior of the skin (D49.2) on the L chest. The differential diagnosis includes NMSC vs other. .   - Shave biopsy performed today, see procedure note below.   - Photographed today    # Benign findings: multiple benign nevi, lentigines, cherry angiomas, SKs  - edu on benign etiology  - Signs and Symptoms of non-melanoma skin cancer and ABCDEs of melanoma reviewed with patient. Patient encouraged to perform monthly self skin exams and educated on how to perform them. UV precautions reviewed with patient. Patient was asked about new or changing moles/lesions on body.   - Sunscreen: Apply 20 minutes prior to going outdoors and reapply every two hours, when wet or sweating. We recommend using an SPF 30 or higher, and to use one that is water resistant.     - RTC for changes      Procedures Performed:   - Shave biopsy x2 procedure note, location(s): see above. After discussion of benefits and risks including but not limited to bleeding, infection, scar, incomplete removal, recurrence, and non-diagnostic biopsy, written consent and photographs were obtained. The area was cleaned with isopropyl alcohol. 0.5mL of 1% lidocaine with epinephrine was injected to obtain  adequate anesthesia of lesion(s). Shave biopsy at site(s) performed. Hemostasis was achieved with aluminium chloride. Petrolatum ointment and a sterile dressing were applied. The patient tolerated the procedure and no complications were noted. The patient was provided with verbal and written post care instructions.      Follow-up: pending path results    Staff and scribe:    Scribe Disclosure:   I, HUMBERTO BECKY, am serving as a scribe; to document services personally performed by Lynn Soares PA-C -based on data collection and the provider's statements to me.     Provider Disclosure:  I agree with above History, Review of Systems, Physical exam and Plan.  I have reviewed the content of the documentation and have edited it as needed. I have personally performed the services documented here and the documentation accurately represents those services and the decisions I have made.      Electronically signed by:    All risks, benefits and alternatives were discussed with patient.  Patient is in agreement and understands the assessment and plan.  All questions were answered.    Lynn Soares PA-C, [facility]  [facility]: Phone: [phone], Fax: [phone]  [facility]: Phone: [phone],  Fax: [phone]  [facility]: Phone: [phone], Fax: [phone]  ____________________________________________    CC: Skin Check      Reviewed patients past medical history and pertinent chart review prior to patient's visit today.     HPI:  Ms. Antoinette Deleon is a 53 year old female who presents today as a new patient for FBSC. Last seen in 2019 for FBSE with Isa Cho PA-C.     Today patient reported a spot of concern on her back and a new spot on the chest.      Has a hx of AK's    Patient is otherwise feeling well, without additional concerns.    Labs:  N/A    Physical Exam:  Vitals: There were no  vitals taken for this visit.  SKIN: Full skin, which includes the head/face, both arms, chest, back, abdomen,both legs, genitalia and/or groin buttocks, digits and/or nails, was examined.   - - Arguello's skin type II, has <100 nevi  - There are dome shaped bright red papules on the trunk.   - Multiple regular brown pigmented macules and papules are identified on the trunk and extremities.   - Scattered brown macules on sun exposed areas.  - There are waxy stuck on tan to brown papules on the trunk.    - L Chest there is a 5 mm erythematous shinny papule   - L scapular back there is a 5 mm pink papule    - No other lesions of concern on areas examined.          L chest          Medications:  Current Outpatient Medications   Medication Sig Dispense Refill     Ascorbic Acid (VITAMIN C) 100 MG CHEW        cholecalciferol (VITAMIN D) 1000 UNIT tablet Take 1,000 Units by mouth daily        levothyroxine (SYNTHROID/LEVOTHROID) 125 MCG tablet Take 125 mcg by mouth daily Dose reduced to 125 mcg per Endocrinology       liothyronine (CYTOMEL) 5 MCG tablet Take 5 mcg by mouth daily Dose reduced to 5 mcg daily.       multivitamin w/minerals (THERA-VIT-M) tablet Take 1 tablet by mouth daily        ondansetron (ZOFRAN) 4 MG tablet TAKE 1 TABLET(4 MG) BY MOUTH EVERY 8 HOURS AS NEEDED FOR NAUSEA OR MIGRAINES Strength: 4 mg 30 tablet 4     SUMAtriptan (IMITREX) 25 MG tablet TAKE 1 TO 2 TABLETS(25 TO 50 MG) BY MOUTH AT ONSET OF HEADACHE FOR MIGRAINE. MAY REPEAT IN 2 HOURS. MAX 8 TABLETS IN 24 HOURS 18 tablet 3     predniSONE (DELTASONE) 20 MG tablet Take 2 tablets (40 mg) by mouth daily 10 tablet 0     No current facility-administered medications for this visit.      Past Medical/Surgical History:   Patient Active Problem List   Diagnosis     Hypothyroidism     Migraine with aura     Cervical high risk HPV (human papillomavirus) test positive     Dysfunctional uterine bleeding     DUB (dysfunctional uterine bleeding)     Pain of  left forearm     Lumbar radiculopathy     Cervical radiculopathy     Past Medical History:   Diagnosis Date     Cervical high risk HPV (human papillomavirus) test positive 11/28/2018     Menorrhagia      Thyroid disease                         Again, thank you for allowing me to participate in the care of your patient.        Sincerely,        Lynn Soares PA-C

## 2024-05-13 LAB
PATH REPORT.COMMENTS IMP SPEC: ABNORMAL
PATH REPORT.COMMENTS IMP SPEC: ABNORMAL
PATH REPORT.COMMENTS IMP SPEC: YES
PATH REPORT.FINAL DX SPEC: ABNORMAL
PATH REPORT.GROSS SPEC: ABNORMAL
PATH REPORT.MICROSCOPIC SPEC OTHER STN: ABNORMAL
PATH REPORT.RELEVANT HX SPEC: ABNORMAL

## 2024-05-14 ENCOUNTER — TELEPHONE (OUTPATIENT)
Dept: FAMILY MEDICINE | Facility: CLINIC | Age: 54
End: 2024-05-14
Payer: COMMERCIAL

## 2024-05-14 NOTE — TELEPHONE ENCOUNTER
----- Message from Lynn Soares PA-C sent at 5/13/2024  5:28 PM CDT -----  A) L chest - BCC - can have ED&C vs imiquimod vs excision - if ED&C/imiquimod I can send Rx or you can put the procedure with me, otherwise refer to derm surg.   B) benign mole - no further changes    A. Left chest:  - Basal cell carcinoma, nodular type - (see description)      B. Left scapular back:  - Intradermal melanocytic nevus -

## 2024-05-14 NOTE — TELEPHONE ENCOUNTER
S/w pt and went over Lynn's message below about biopsy results.  Pt did review results via my chart.  Explained and answered questions about treatment options and pt would like imiquidmod.    Pharmacy pended.    Fani SULLIVAN RN  [facility] [place]  [phone]

## 2024-05-15 ENCOUNTER — TELEPHONE (OUTPATIENT)
Dept: FAMILY MEDICINE | Facility: CLINIC | Age: 54
End: 2024-05-15

## 2024-05-15 DIAGNOSIS — C44.519 BASAL CELL CARCINOMA OF ANTERIOR CHEST: Primary | ICD-10-CM

## 2024-05-15 RX ORDER — IMIQUIMOD 12.5 MG/.25G
CREAM TOPICAL
Qty: 8 PACKET | Refills: 1 | Status: SHIPPED | OUTPATIENT
Start: 2024-05-15

## 2024-05-15 NOTE — TELEPHONE ENCOUNTER
"S/W patient. Advised message below from provider. Patient voiced understanding and will schedule her 6x mos skin check later when she is available.    Thank you  An Le    \"I sent over the imiquimod. She needs to use it M-F for 6 consecutive weeks. She also needs a FBSE in 6mo, I am not sure if she has scheduled that yet. Additionally I would like to see her 1mo after she stops treatment with the imiquimod to look just at her chest to ensure a proper treatment with the cream. \"  "

## 2024-05-22 ENCOUNTER — TELEPHONE (OUTPATIENT)
Dept: FAMILY MEDICINE | Facility: CLINIC | Age: 54
End: 2024-05-22
Payer: COMMERCIAL

## 2024-05-22 NOTE — TELEPHONE ENCOUNTER
----- Message from Lynn Soares PA-C sent at 5/15/2024 12:29 PM CDT -----  I sent over the imiquimod. She needs to use it M-F for 6 consecutive weeks. She also needs a FBSE in 6mo, I am not sure if she has scheduled that yet. Additionally I would like to see her 1mo after she stops treatment with the imiquimod to look just at her chest to ensure a proper treatment with the cream.     Thank you!    Lisa

## 2024-05-22 NOTE — TELEPHONE ENCOUNTER
S/w pt and went over Lynn's message below.  Pt states she started the imiquidmod cream last week.  Scheduled follow up on July 29th at 8:30 am.    Pt states understanding.    Fani SULLIVAN RN  [facility]  [phone]

## 2024-05-24 ENCOUNTER — VIRTUAL VISIT (OUTPATIENT)
Dept: FAMILY MEDICINE | Facility: CLINIC | Age: 54
End: 2024-05-24
Payer: COMMERCIAL

## 2024-05-24 DIAGNOSIS — Z12.31 VISIT FOR SCREENING MAMMOGRAM: ICD-10-CM

## 2024-05-24 DIAGNOSIS — R53.83 OTHER FATIGUE: ICD-10-CM

## 2024-05-24 DIAGNOSIS — G43.109 MIGRAINE WITH AURA AND WITHOUT STATUS MIGRAINOSUS, NOT INTRACTABLE: Primary | ICD-10-CM

## 2024-05-24 DIAGNOSIS — E03.9 HYPOTHYROIDISM, UNSPECIFIED TYPE: ICD-10-CM

## 2024-05-24 PROCEDURE — 99214 OFFICE O/P EST MOD 30 MIN: CPT | Mod: 95 | Performed by: PHYSICIAN ASSISTANT

## 2024-05-24 RX ORDER — SUMATRIPTAN 25 MG/1
TABLET, FILM COATED ORAL
Qty: 18 TABLET | Refills: 3 | Status: SHIPPED | OUTPATIENT
Start: 2024-05-24

## 2024-05-24 RX ORDER — PROGESTERONE 100 MG/1
100 CAPSULE ORAL AT BEDTIME
COMMUNITY

## 2024-05-24 RX ORDER — ESTRADIOL 1 MG/1
1 TABLET ORAL DAILY
COMMUNITY
Start: 2023-10-28

## 2024-05-24 RX ORDER — ONDANSETRON 4 MG/1
TABLET, FILM COATED ORAL
Qty: 30 TABLET | Refills: 4 | Status: SHIPPED | OUTPATIENT
Start: 2024-05-24

## 2024-05-24 ASSESSMENT — ENCOUNTER SYMPTOMS: WEAKNESS: 1

## 2024-05-24 NOTE — PROGRESS NOTES
"Antoinette is a 53 year old who is being evaluated via a billable video visit.    How would you like to obtain your AVS? MyChart  If the video visit is dropped, the invitation should be resent by: Send to e-mail at: [email or web address]  Will anyone else be joining your video visit? No      Assessment & Plan     1. Migraine with aura and without status migrainosus, not intractable  Stable, continue same medication  - SUMAtriptan (IMITREX) 25 MG tablet; TAKE 1 TO 2 TABLETS(25 TO 50 MG) BY MOUTH AT ONSET OF HEADACHE FOR MIGRAINE. MAY REPEAT IN 2 HOURS. MAX 8 TABLETS IN 24 HOURS  Dispense: 18 tablet; Refill: 3  - ondansetron (ZOFRAN) 4 MG tablet; TAKE 1 TABLET(4 MG) BY MOUTH EVERY 8 HOURS AS NEEDED FOR NAUSEA OR MIGRAINES Strength: 4 mg  Dispense: 30 tablet; Refill: 4    2. Hypothyroidism, unspecified type  I suggest that we check labs, specifically her TSH and T4 as these were not rechecked after her most recent medication adjustment.  She will schedule lab only visit  - TSH; Future  - T4, free; Future    3. Other fatigue  Labs ordered to assess for cause of fatigue  - CBC with platelets and differential; Future  - Ferritin; Future  - Basic metabolic panel  (Ca, Cl, CO2, Creat, Gluc, K, Na, BUN); Future    4. Visit for screening mammogram  - MA SCREENING DIGITAL BILAT - Future  (s+30); Future        BMI  Estimated body mass index is 31.53 kg/m  as calculated from the following:    Height as of 2/27/24: 1.791 m (5' 10.5\").    Weight as of 2/27/24: 101.1 kg (222 lb 14.4 oz).             Subjective   Antoinette is a 53 year old, presenting for the following health issues:  Medication Follow-up (Migraines. ) and Consult (Weight loss management, gained 40 pounds. )        5/24/2024    11:44 AM   Additional Questions   Roomed by Tamika CROWLEY     History of Present Illness       Headaches:   Since the patient's last clinic visit, headaches are: no change  The patient is getting headaches:  1 every other month  She is not able to do " "normal daily activities when she has a migraine.  The patient is taking the following rescue/relief medications:  Sumatriptan (Imitrex)   Patient states \"I get some relief\" from the rescue/relief medications.   The patient is taking the following medications to prevent migraines:  No medications to prevent migraines  In the past 4 weeks, the patient has gone to an Urgent Care or Emergency Room 0 times times due to headaches.    Reason for visit:  Migeaine action plan and weight gain    She eats 4 or more servings of fruits and vegetables daily.She consumes 0 sweetened beverage(s) daily.She exercises with enough effort to increase her heart rate 30 to 60 minutes per day.  She exercises with enough effort to increase her heart rate 7 days per week.   She is taking medications regularly.  Fatigue  This is a new problem. The current episode started more than 1 month ago. The problem occurs daily. The problem has been unchanged. Associated symptoms include weakness.        Medication Followup of Sumatriptan   Taking Medication as prescribed: yes  Side Effects:  None  Medication Helping Symptoms:  yes     Patient presents to for renewal of migraine medication.  She is currently taking PRN Imitrex and Zofran.  This combination works well for her symptoms.  Migraines occur approx 1-2 x monthly.     She has been struggling to lose weight despite eating healthy and exercising regularly.  She follows with an endocrinologist for hypothyroidism and her thyroid medication was reduced over the past 6 months but was not rechecked.  She also notes some fatigue.          Review of Systems  Constitutional, HEENT, cardiovascular, pulmonary, GI, , musculoskeletal, neuro, skin, endocrine and psych systems are negative, except as otherwise noted.      Objective             Physical Exam   GENERAL: alert and no distress  EYES: Eyes grossly normal to inspection.  No discharge or erythema, or obvious scleral/conjunctival " abnormalities.  RESP: No audible wheeze, cough, or visible cyanosis.    SKIN: Visible skin clear. No significant rash, abnormal pigmentation or lesions.  NEURO: Cranial nerves grossly intact.  Mentation and speech appropriate for age.  PSYCH: Appropriate affect, tone, and pace of words          Video-Visit Details    Type of service:  Video Visit   Originating Location (pt. Location): Home    Distant Location (provider location):  On-site  Platform used for Video Visit: Kylee  Signed Electronically by: Florian Garner PA-C

## 2024-06-04 ENCOUNTER — OFFICE VISIT (OUTPATIENT)
Dept: OPTOMETRY | Facility: CLINIC | Age: 54
End: 2024-06-04
Payer: COMMERCIAL

## 2024-06-04 DIAGNOSIS — H10.13 ALLERGIC CONJUNCTIVITIS OF BOTH EYES: ICD-10-CM

## 2024-06-04 DIAGNOSIS — Z01.00 EXAMINATION OF EYES AND VISION: Primary | ICD-10-CM

## 2024-06-04 DIAGNOSIS — H52.4 PRESBYOPIA: ICD-10-CM

## 2024-06-04 DIAGNOSIS — H04.123 DRY EYE SYNDROME OF BOTH EYES: ICD-10-CM

## 2024-06-04 PROCEDURE — 92004 COMPRE OPH EXAM NEW PT 1/>: CPT | Performed by: OPTOMETRIST

## 2024-06-04 PROCEDURE — 92015 DETERMINE REFRACTIVE STATE: CPT | Performed by: OPTOMETRIST

## 2024-06-04 RX ORDER — AZELASTINE HYDROCHLORIDE 0.5 MG/ML
1 SOLUTION/ DROPS OPHTHALMIC 2 TIMES DAILY PRN
Qty: 6 ML | Refills: 11 | Status: SHIPPED | OUTPATIENT
Start: 2024-06-04 | End: 2025-06-04

## 2024-06-04 ASSESSMENT — KERATOMETRY
OS_AXISANGLE_DEGREES: 137
OD_K2POWER_DIOPTERS: 42.75
OS_K2POWER_DIOPTERS: 42.75
OD_AXISANGLE_DEGREES: 037
OS_K1POWER_DIOPTERS: 42.00
OS_AXISANGLE2_DEGREES: 047
OD_AXISANGLE2_DEGREES: 127
OD_K1POWER_DIOPTERS: 42.25

## 2024-06-04 ASSESSMENT — VISUAL ACUITY
OS_SC+: -1
OS_SC: 20/25
OS_CC: 20/25
OD_SC: 20/25
OS_CC+: -1
OD_CC+: -3
OD_CC: 20/25
OD_SC+: -2
OD_CC: 20/20
OS_CC: 20/20
CORRECTION_TYPE: GLASSES
METHOD: SNELLEN - LINEAR

## 2024-06-04 ASSESSMENT — CONF VISUAL FIELD
OD_INFERIOR_NASAL_RESTRICTION: 0
OS_INFERIOR_TEMPORAL_RESTRICTION: 0
OS_INFERIOR_NASAL_RESTRICTION: 0
OS_NORMAL: 1
OD_SUPERIOR_NASAL_RESTRICTION: 0
OD_SUPERIOR_TEMPORAL_RESTRICTION: 0
OS_SUPERIOR_TEMPORAL_RESTRICTION: 0
OS_SUPERIOR_NASAL_RESTRICTION: 0
OD_INFERIOR_TEMPORAL_RESTRICTION: 0
OD_NORMAL: 1

## 2024-06-04 ASSESSMENT — REFRACTION_WEARINGRX
SPECS_TYPE: PAL
OS_SPHERE: PLANO
OS_CYLINDER: SPHERE
OD_CYLINDER: SPHERE
OD_SPHERE: PLANO
OS_ADD: +2.00
OD_ADD: +2.00

## 2024-06-04 ASSESSMENT — REFRACTION_MANIFEST
OD_ADD: +2.25
OS_CYLINDER: +0.75
OD_AXIS: 025
OD_CYLINDER: +0.75
OS_AXIS: 150
OD_SPHERE: -0.50
OS_ADD: +2.25
OS_SPHERE: -0.25

## 2024-06-04 ASSESSMENT — EXTERNAL EXAM - LEFT EYE: OS_EXAM: NORMAL

## 2024-06-04 ASSESSMENT — TONOMETRY
IOP_METHOD: TONOPEN
OD_IOP_MMHG: 17
OS_IOP_MMHG: 17

## 2024-06-04 ASSESSMENT — CUP TO DISC RATIO
OS_RATIO: 0.5
OD_RATIO: 0.5

## 2024-06-04 ASSESSMENT — SLIT LAMP EXAM - LIDS
COMMENTS: MEIBOMIAN GLAND DYSFUNCTION
COMMENTS: MEIBOMIAN GLAND DYSFUNCTION

## 2024-06-04 ASSESSMENT — EXTERNAL EXAM - RIGHT EYE: OD_EXAM: NORMAL

## 2024-06-04 NOTE — LETTER
6/4/2024         RE: Antoinette Deleon  [address]  [place] [address]        Dear Colleague,    Thank you for referring your patient, Antoinette Deleon, to the [facility]. Please see a copy of my visit note below.    Chief Complaint   Patient presents with     Annual Eye Exam      Accompanied by   Last Eye Exam: 2021  Dilated Previously: Yes    What are you currently using to see?  glasses       Distance Vision Acuity: Satisfied with vision without glasses    Near Vision Acuity: Satisfied with vision while reading  with glasses    Eye Comfort: itchy maybe from thyroid meds   Do you use eye drops? : No  Occupation or Hobbies: consulting     Hypothyroid- has been decreasing thyroid meds due to anxiety    Natalie Dominique Optometric Assistant, A.BIsabelOIsabelC.      Medical, surgical and family histories reviewed and updated 6/4/2024.       OBJECTIVE: See Ophthalmology exam    ASSESSMENT:    ICD-10-CM    1. Examination of eyes and vision  Z01.00 EYE EXAM (SIMPLE-NONBILLABLE)      2. Presbyopia  H52.4 REFRACTION      3. Allergic conjunctivitis of both eyes  H10.13 EYE EXAM (SIMPLE-NONBILLABLE)     azelastine (OPTIVAR) 0.05 % ophthalmic solution      4. Dry eye syndrome of both eyes  H04.123           PLAN:     Patient Instructions   Recommend new glasses.  Give the glasses 1-2 weeks to adjust to the new prescription.  You may get headaches or eyestrain as your eyes get used to the new prescription.  Sometimes the symptoms get worse before it gets better.  If any problems after 1-2 weeks schedule an appointment for a recheck.      Optivar- 1 drop both eyes 2 x day as needed for itchy eyes.    Non preserved artificial tears- 1 drop both eyes 2-4 x day.    Return in 1 year for a complete eye exam or sooner if needed.    Pete Lucio, WHITNEY            Again, thank you for allowing me to participate in the care of your patient.        Sincerely,        Pete Lucio, OD

## 2024-06-04 NOTE — PATIENT INSTRUCTIONS
Recommend new glasses.  Give the glasses 1-2 weeks to adjust to the new prescription.  You may get headaches or eyestrain as your eyes get used to the new prescription.  Sometimes the symptoms get worse before it gets better.  If any problems after 1-2 weeks schedule an appointment for a recheck.      Optivar- 1 drop both eyes 2 x day as needed for itchy eyes.    Non preserved artificial tears- 1 drop both eyes 2-4 x day.    Return in 1 year for a complete eye exam or sooner if needed.    Pete Lucio, OD    The affects of the dilating drops last for 4- 6 hours.  You will be more sensitive to light and vision will be blurry up close.  Do not drive if you do not feel comfortable.  Mydriatic sunglasses were given if needed.    There is a combination of three treatments which can greatly improve symptoms of dry eyes.     Artificial tears  Heat (eyes closed)  Eyelid and eyelash cleansing (eyes closed)     Use one drop of artificial tears both eyes 4 x daily.  Once in the morning, lunch, dinner and bedtime. Continue to use the drops regardless if your eyes are comfortable or not.  Artificial tears work best as a preventative and not as well after your eyes are starting to bother you.  It may take 4- 6 weeks of using the drops before you notice improvement.  If after that time you are still having problems schedule an appointment for an evaluation and discussion of different treatments such as Restasis or Xiidra.  Dry eyes are a chronic condition and you may have more symptoms at certain times of the year.    Excess tearing can be due to the right tears not working properly or a blockage in the tear drainage system.  You can try using artificial tears 1 drop both eyes 4 x day.  If the excess tearing is bothersome after 4-6 weeks of treatment then we can send you for further testing.  This would entail a referral to our oculoplastic specialist Dr. Chepe To at the [facility]-[phone].    Recommended brands  are:    Systane Complete  Systane Ultra  Systane Balance  Refresh Advanced Optive  Refresh Relieva  Blink    Recommended brands for contact lens wearers are:    Systane contacts  Refresh contacts  Blink contacts    If you are using drops more than 4 x day or have sensitivities to preservatives I recommend non preserved artificial tears.  These come in 1 use vials.  They can be used every 1-2 hours.  Do not reuse the vials.    Recommended brands are:    Refresh Optive Miko-3  Systane- preservative free  Refresh-  preservative free  Blink- preservative free    Gels or ointment can be used at night.    Recommended brands are:    Systane Gel  Refresh Gel  Blink Gel  Genteal Gel    Systane night time (ointment)  Refresh Celluvisc  Refresh PM (ointment)    Optase dry eye spray.  Spray to eyelids 3-4 x daily.  This can be purchased on Amazon.      Visine, Clear Eyes or Murine (drops that get the red out) can irritate the eyes and cause a rebound effect where the eyes become more red and you end up using more drops.  Avoid drops containing tetrahydrozoline, naphazoline, phenylephrine, oxymetazoline.      OTC Lumify is a newer product that gives immediate redness relief without the rebound effect.  Use as needed to take the redness out.    Artificial tears may be used with other drops (such as allergy, glaucoma, antibiotics) around the same time.  Be sure to wait 5 minutes in between drops.    Heat to the eyelids can also improve your symptoms of dry eyes.  Chantal heat masks can be purchased at Amazon to be used nightly for 10-15 minutes.  Other options are gel masks that can be put in the microwave and purchased at most pharmacies.      Tea Tree Oil eyelid cleansers recommended are Ocusoft Oust foam cleanser to cleanse eyelids/lashes at night and in the am. Other options are Blephadex or Cliradex eyelid wipes.  KEEP EYES CLOSED when using these products.  These can be purchased on amazon.com   A good product for make up  remover with tea tree oil is WeLoveEyWenjuan.com.  This can be found at www.Medudem.Hepa Wash or "[employer].    Other good eyelid cleansers have hypochlorous which removes excess bacteria and is safe around the eyes. Products are Avenova, Ocusoft Hypochlor or Heyedrate. Spray solution onto cotton pad, close eyes and gently apply to eyelids and eyelashes using side to side motion.  You can also KEEP EYES CLOSED spray and rub into eyelashes.  You do not need to rinse it off. Use morning and evening. These products can be found on Amazon.  You can check with your local pharmacy and see if they can order if for you if they don't have it.    Other brands of eyelid cleansing wipes are:    Ocusoft wipes  Systane wipes    A great eye make up line is https://[employer]uru/.      Optometry Providers       Clinic Locations                                 Telephone Number   Dr. Mandy Zavala [facility] [phone]     [place] [facility] Hours:                [place] [facility] Hours:       [place] [facility] Hours:   [address]   [address]     [address] [place] [address]   [place] [address]    [place] [address]  Phone: [phone]                    Phone: [phone]     Phone: [phone]                      Monday 8:00-6:00                          Monday 8:00-6:00                          Monday 8:00-6:00              Tuesday 8:00-6:00                          Tuesday 8:00-6:00                          Tuesday 8:00-6:00              Wednesday 8:00-6:00                  Wednesday 8:00-6:00                   Wednesday 8:00-6:00      Thursday 8:00-6:00                        Thursday 8:00-6:00                         Thursday 8:00-6:00            Friday 8:00-5:00                              Friday 8:00-5:00                               Friday 8:00-5:00    [facility] Hours:   [address] [place] [address]  [phone]    Monday 9:00-6:00  Tuesday 9:00-6:00  Wednesday 9:00-6:00  Thursday 9:00-6:00  Friday 9:00-5:00  As always, Thank you for trusting us with your health care needs!

## 2024-06-04 NOTE — PROGRESS NOTES
Chief Complaint   Patient presents with    Annual Eye Exam      Accompanied by   Last Eye Exam: 2021  Dilated Previously: Yes    What are you currently using to see?  glasses       Distance Vision Acuity: Satisfied with vision without glasses    Near Vision Acuity: Satisfied with vision while reading  with glasses    Eye Comfort: itchy maybe from thyroid meds   Do you use eye drops? : No  Occupation or Hobbies: consulting     Hypothyroid- has been decreasing thyroid meds due to anxiety    Natalie Dominique Optometric Assistant, A.B.O.C.      Medical, surgical and family histories reviewed and updated 6/4/2024.       OBJECTIVE: See Ophthalmology exam    ASSESSMENT:    ICD-10-CM    1. Examination of eyes and vision  Z01.00 EYE EXAM (SIMPLE-NONBILLABLE)      2. Presbyopia  H52.4 REFRACTION      3. Allergic conjunctivitis of both eyes  H10.13 EYE EXAM (SIMPLE-NONBILLABLE)     azelastine (OPTIVAR) 0.05 % ophthalmic solution      4. Dry eye syndrome of both eyes  H04.123           PLAN:     Patient Instructions   Recommend new glasses.  Give the glasses 1-2 weeks to adjust to the new prescription.  You may get headaches or eyestrain as your eyes get used to the new prescription.  Sometimes the symptoms get worse before it gets better.  If any problems after 1-2 weeks schedule an appointment for a recheck.      Optivar- 1 drop both eyes 2 x day as needed for itchy eyes.    Non preserved artificial tears- 1 drop both eyes 2-4 x day.    Return in 1 year for a complete eye exam or sooner if needed.    Pete Lucio, OD

## 2024-06-10 PROBLEM — Z53.9 ERRONEOUS ENCOUNTER--DISREGARD: Status: ACTIVE | Noted: 2024-06-10

## 2024-07-29 ENCOUNTER — OFFICE VISIT (OUTPATIENT)
Dept: DERMATOLOGY | Facility: CLINIC | Age: 54
End: 2024-07-29
Payer: COMMERCIAL

## 2024-07-29 DIAGNOSIS — Z85.828 HISTORY OF NONMELANOMA SKIN CANCER: Primary | ICD-10-CM

## 2024-07-29 PROCEDURE — 99213 OFFICE O/P EST LOW 20 MIN: CPT | Performed by: PHYSICIAN ASSISTANT

## 2024-07-29 NOTE — LETTER
7/29/2024      Antoinette Deleon  [address]  [place] [address]      Dear Colleague,    Thank you for referring your patient, Antoinette Deleon, to the [facility]. Please see a copy of my visit note below.    [facility] Dermatology Note  Encounter Date: Jul 29, 2024  Office Visit      Dermatology Problem List:  FBSE: 5/9/24   1. AK s/p cryo   2. Hx of NMSC  - BCC, L chest, Bx 5/9/24 Tx: Imiquimod - great response  ____________________________________________    Assessment & Plan:  # Hx BCC on L chest which she treated with imiquimod for 6 weeks  - recheck today - no evidence of recurrence or residual lesion, excellent response  - Plan for next FBSE in 3mo    Procedures Performed:   None     Follow-up: 3mo for next FBSE. prn for new or changing lesions    Staff and scribe:    Scribe Disclosure:   I, HUMBERTO PENA, am serving as a scribe; to document services personally performed by Lynn Soares PA-C -based on data collection and the provider's statements to me.     Provider Disclosure:  I agree with above History, Review of Systems, Physical exam and Plan.  I have reviewed the content of the documentation and have edited it as needed. I have personally performed the services documented here and the documentation accurately represents those services and the decisions I have made.      Electronically signed by:    All risks, benefits and alternatives were discussed with patient.  Patient is in agreement and understands the assessment and plan.  All questions were answered.    Lynn Soares PA-C, MPAS  [facility] [place]: Phone: [phone], Fax: [phone]  [facility]: Phone: [phone],  Fax: [phone]  [facility]: Phone: [phone], Fax: [phone]  ____________________________________________    CC: RECHECK      Reviewed patients  past medical history and pertinent chart review prior to patient's visit today.     HPI:  Ms. Antoinette Deleon is a 53 year old female who presents today as a return patient for a follow up after imiquimod treatment. Notes she used 6 weeks, staring on 5/15/24. Has let the site heal for past 6 weeks. Patient is very happy with the result. Notes she also noticed other spots on her chest get a little inflamed with the therapy and then also go away.     Patient is otherwise feeling well, without additional concerns.    Labs:  N/A    Physical Exam:  Vitals: There were no vitals taken for this visit.  SKIN: Focused examination of L chest was performed.   - Had a great response from imiquimod treatment. NERD on L chest  - No other lesions of concern on areas examined.     Medications:  Current Outpatient Medications   Medication Sig Dispense Refill     Ascorbic Acid (VITAMIN C) 100 MG CHEW        azelastine (OPTIVAR) 0.05 % ophthalmic solution Place 1 drop into both eyes 2 times daily as needed (for itchy eyes) 6 mL 11     cholecalciferol (VITAMIN D) 1000 UNIT tablet Take 1,000 Units by mouth daily        estradiol (ESTRACE) 1 MG tablet Take 1 tablet by mouth daily       imiquimod (ALDARA) 5 % external cream Apply topically to the left chest Monday-Friday for 6 consecutive weeks. Wash hands after application. (Patient not taking: Reported on 7/29/2024) 8 packet 1     levothyroxine (SYNTHROID/LEVOTHROID) 125 MCG tablet Take 125 mcg by mouth daily Dose reduced to 125 mcg per Endocrinology       liothyronine (CYTOMEL) 5 MCG tablet Take 5 mcg by mouth daily Dose reduced to 5 mcg daily.       multivitamin w/minerals (THERA-VIT-M) tablet Take 1 tablet by mouth daily        ondansetron (ZOFRAN) 4 MG tablet TAKE 1 TABLET(4 MG) BY MOUTH EVERY 8 HOURS AS NEEDED FOR NAUSEA OR MIGRAINES Strength: 4 mg 30 tablet 4     progesterone (PROMETRIUM) 100 MG capsule Take 100 mg by mouth at bedtime       SUMAtriptan (IMITREX) 25 MG tablet  TAKE 1 TO 2 TABLETS(25 TO 50 MG) BY MOUTH AT ONSET OF HEADACHE FOR MIGRAINE. MAY REPEAT IN 2 HOURS. MAX 8 TABLETS IN 24 HOURS 18 tablet 3     No current facility-administered medications for this visit.      Past Medical/Surgical History:   Patient Active Problem List   Diagnosis     Hypothyroidism     Migraine with aura     Cervical high risk HPV (human papillomavirus) test positive     Dysfunctional uterine bleeding     DUB (dysfunctional uterine bleeding)     Pain of left forearm     Lumbar radiculopathy     Cervical radiculopathy     ERRONEOUS ENCOUNTER--DISREGARD     Past Medical History:   Diagnosis Date     Cervical high risk HPV (human papillomavirus) test positive 11/28/2018     Menorrhagia      Thyroid disease                         Again, thank you for allowing me to participate in the care of your patient.        Sincerely,        Lynn Soares PA-C

## 2024-07-29 NOTE — PROGRESS NOTES
[facility] Dermatology Note  Encounter Date: Jul 29, 2024  Office Visit      Dermatology Problem List:  FBSE: 5/9/24   1. AK s/p cryo   2. Hx of NMSC  - BCC, L chest, Bx 5/9/24 Tx: Imiquimod - great response  ____________________________________________    Assessment & Plan:  # Hx BCC on L chest which she treated with imiquimod for 6 weeks  - recheck today - no evidence of recurrence or residual lesion, excellent response  - Plan for next FBSE in 3mo    Procedures Performed:   None     Follow-up: 3mo for next FBSE. prn for new or changing lesions    Staff and scribe:    Scribe Disclosure:   I, HUMBERTO PENA, am serving as a scribe; to document services personally performed by Lynn Soares PA-C -based on data collection and the provider's statements to me.     Provider Disclosure:  I agree with above History, Review of Systems, Physical exam and Plan.  I have reviewed the content of the documentation and have edited it as needed. I have personally performed the services documented here and the documentation accurately represents those services and the decisions I have made.      Electronically signed by:    All risks, benefits and alternatives were discussed with patient.  Patient is in agreement and understands the assessment and plan.  All questions were answered.    Lynn Soares PA-C, [facility]  [facility] [place]: Phone: [phone], Fax: [phone]  [facility]: Phone: [phone],  Fax: [phone]  [facility]: Phone: [phone], Fax: [phone]  ____________________________________________    CC: RECHECK      Reviewed patients past medical history and pertinent chart review prior to patient's visit today.     HPI:  Ms. Antoinette Deleon is a 53 year old female who presents today as a return patient for a follow up after imiquimod treatment. Notes she used 6 weeks, staring  on 5/15/24. Has let the site heal for past 6 weeks. Patient is very happy with the result. Notes she also noticed other spots on her chest get a little inflamed with the therapy and then also go away.     Patient is otherwise feeling well, without additional concerns.    Labs:  N/A    Physical Exam:  Vitals: There were no vitals taken for this visit.  SKIN: Focused examination of L chest was performed.   - Had a great response from imiquimod treatment. NERD on L chest  - No other lesions of concern on areas examined.     Medications:  Current Outpatient Medications   Medication Sig Dispense Refill    Ascorbic Acid (VITAMIN C) 100 MG CHEW       azelastine (OPTIVAR) 0.05 % ophthalmic solution Place 1 drop into both eyes 2 times daily as needed (for itchy eyes) 6 mL 11    cholecalciferol (VITAMIN D) 1000 UNIT tablet Take 1,000 Units by mouth daily       estradiol (ESTRACE) 1 MG tablet Take 1 tablet by mouth daily      imiquimod (ALDARA) 5 % external cream Apply topically to the left chest Monday-Friday for 6 consecutive weeks. Wash hands after application. (Patient not taking: Reported on 7/29/2024) 8 packet 1    levothyroxine (SYNTHROID/LEVOTHROID) 125 MCG tablet Take 125 mcg by mouth daily Dose reduced to 125 mcg per Endocrinology      liothyronine (CYTOMEL) 5 MCG tablet Take 5 mcg by mouth daily Dose reduced to 5 mcg daily.      multivitamin w/minerals (THERA-VIT-M) tablet Take 1 tablet by mouth daily       ondansetron (ZOFRAN) 4 MG tablet TAKE 1 TABLET(4 MG) BY MOUTH EVERY 8 HOURS AS NEEDED FOR NAUSEA OR MIGRAINES Strength: 4 mg 30 tablet 4    progesterone (PROMETRIUM) 100 MG capsule Take 100 mg by mouth at bedtime      SUMAtriptan (IMITREX) 25 MG tablet TAKE 1 TO 2 TABLETS(25 TO 50 MG) BY MOUTH AT ONSET OF HEADACHE FOR MIGRAINE. MAY REPEAT IN 2 HOURS. MAX 8 TABLETS IN 24 HOURS 18 tablet 3     No current facility-administered medications for this visit.      Past Medical/Surgical History:   Patient Active Problem  List   Diagnosis    Hypothyroidism    Migraine with aura    Cervical high risk HPV (human papillomavirus) test positive    Dysfunctional uterine bleeding    DUB (dysfunctional uterine bleeding)    Pain of left forearm    Lumbar radiculopathy    Cervical radiculopathy    ERRONEOUS ENCOUNTER--DISREGARD     Past Medical History:   Diagnosis Date    Cervical high risk HPV (human papillomavirus) test positive 11/28/2018    Menorrhagia     Thyroid disease

## 2024-07-29 NOTE — PATIENT INSTRUCTIONS
Proper skin care from [facility]:    -Eliminate harsh soaps as they strip the natural oils from the skin, often resulting in dry itchy skin ( i.e. Dial, Zest, [place] Spring)  -Use mild soaps such as Cetaphil or Dove Sensitive Skin in the shower. You do not need to use soap on arms, legs, and trunk every time you shower unless visibly soiled.   -Avoid hot or cold showers.  -After showering, lightly dry off and apply moisturizing within 2-3 minutes. This will help trap moisture in the skin.   -Aggressive use of a moisturizer at least 1-2 times a day to the entire body (including -Vanicream, Cetaphil, Aquaphor or Cerave) and moisturize hands after every washing.  -We recommend using moisturizers that come in a tub that needs to be scooped out, not a pump. This has more of an oil base. It will hold moisture in your skin much better than a water base moisturizer. The above recommended are non-pore clogging.      Wear a sunscreen with at least SPF 30 on your face, ears, neck and V of the chest daily. Wear sunscreen on other areas of the body if those areas are exposed to the sun throughout the day. Sunscreens can contain physical and/or chemical blockers. Physical blockers are less likely to clog pores, these include zinc oxide and titanium dioxide. Reapply every two hour and after swimming.     Sunscreen examples: https://www.ewg.org/sunscreen/    UV radiation  UVA radiation remains constant throughout the day and throughout the year. It is a longer wavelength than UVB and therefore penetrates deeper into the skin leading to immediate and delayed tanning, photoaging, and skin cancer. 70-80% of UVA and UVB radiation occurs between the hours of 10am-2pm.  UVB radiation  UVB radiation causes the most harmful effects and is more significant during the summer months. However, snow and ice can reflect UVB radiation leading to skin damage during the winter months as well. UVB radiation is responsible for tanning,  burning, inflammation, delayed erythema (pinkness), pigmentation (brown spots), and skin cancer.     I recommend self monthly full body exams and yearly full body exams with a dermatology provider. If you develop a new or changing lesion please follow up for examination. Most skin cancers are pink and scaly or pink and pearly. However, we do see blue/brown/black skin cancers.  Consider the ABCDEs of melanoma when giving yourself your monthly full body exam ( don't forget the groin, buttocks, feet, toes, etc). A-asymmetry, B-borders, C-color, D-diameter, E-elevation or evolving. If you see any of these changes please follow up in clinic. If you cannot see your back I recommend purchasing a hand held mirror to use with a larger wall mirror.       Checking for Skin Cancer  You can find cancer early by checking your skin each month. There are 3 kinds of skin cancer. They are melanoma, basal cell carcinoma, and squamous cell carcinoma. Doing monthly skin checks is the best way to find new marks or skin changes. Follow the instructions below for checking your skin.   The ABCDEs of checking moles for melanoma   Check your moles or growths for signs of melanoma using ABCDE:   Asymmetry: the sides of the mole or growth don t match  Border: the edges are ragged, notched, or blurred  Color: the color within the mole or growth varies  Diameter: the mole or growth is larger than 6 mm (size of a pencil eraser)  Evolving: the size, shape, or color of the mole or growth is changing (evolving is not shown in the images below)    Checking for other types of skin cancer  Basal cell carcinoma or squamous cell carcinoma have symptoms such as:     A spot or mole that looks different from all other marks on your skin  Changes in how an area feels, such as itching, tenderness, or pain  Changes in the skin's surface, such as oozing, bleeding, or scaliness  A sore that does not heal  New swelling or redness beyond the border of a  mole    Who s at risk?  Anyone can get skin cancer. But you are at greater risk if you have:   Fair skin, light-colored hair, or light-colored eyes  Many moles or abnormal moles on your skin  A history of sunburns from sunlight or tanning beds  A family history of skin cancer  A history of exposure to radiation or chemicals  A weakened immune system  If you have had skin cancer in the past, you are at risk for recurring skin cancer.   How to check your skin  Do your monthly skin checkups in front of a full-length mirror. Check all parts of your body, including your:   Head (ears, face, neck, and scalp)  Torso (front, back, and sides)  Arms (tops, undersides, upper, and lower armpits)  Hands (palms, backs, and fingers, including under the nails)  Buttocks and genitals  Legs (front, back, and sides)  Feet (tops, soles, toes, including under the nails, and between toes)  If you have a lot of moles, take digital photos of them each month. Make sure to take photos both up close and from a distance. These can help you see if any moles change over time.   Most skin changes are not cancer. But if you see any changes in your skin, call your doctor right away. Only he or she can diagnose a problem. If you have skin cancer, seeing your doctor can be the first step toward getting the treatment that could save your life.   [employer] last reviewed this educational content on 4/1/2019 2000-2020 [employer]. [address] [place] [address], [place] [address]. All rights reserved. This information is not intended as a substitute for professional medical care. Always follow your healthcare professional's instructions.       When should I call my doctor?  If you are worsening or not improving, please, contact us or seek urgent care as noted below.     Who should I call with questions (adults)?    [facility] [phone]  For urgent needs outside of business hours call the [facility] at  [phone] and ask for the dermatology resident on call to be paged  If this is a medical emergency and you are unable to reach an ER, Call 911      If you need a prescription refill, please contact your pharmacy. Refills are approved or denied by our Physicians during normal business hours, Monday through Fridays  Per office policy, refills will not be granted if you have not been seen within the past year (or sooner depending on your child's condition)

## 2024-07-31 ENCOUNTER — TRANSFERRED RECORDS (OUTPATIENT)
Dept: MULTI SPECIALTY CLINIC | Facility: CLINIC | Age: 54
End: 2024-07-31

## 2024-08-11 ENCOUNTER — HEALTH MAINTENANCE LETTER (OUTPATIENT)
Age: 54
End: 2024-08-11

## 2024-08-22 PROBLEM — M54.16 LUMBAR RADICULOPATHY: Status: RESOLVED | Noted: 2024-03-05 | Resolved: 2024-08-22

## 2024-08-22 PROBLEM — M54.12 CERVICAL RADICULOPATHY: Status: RESOLVED | Noted: 2024-03-05 | Resolved: 2024-08-22

## 2024-08-22 PROBLEM — Z53.9 ERRONEOUS ENCOUNTER--DISREGARD: Status: RESOLVED | Noted: 2024-06-10 | Resolved: 2024-08-22

## 2024-08-22 NOTE — PROGRESS NOTES
DISCHARGE  Reason for Discharge: Patient was doing well at her last visit and has not needed additional visits.      Equipment Issued:     Discharge Plan: Patient to continue home program.    Referring Provider:  Referred Self     04/19/24 0500   Appointment Info   Signing clinician's name / credentials Monalisa Lopez PT   Total/Authorized Visits 6 (PT estimate)   Visits Used 4   Medical Diagnosis L lumbar radiculopathy; L cervical radiculopathy   PT Tx Diagnosis L lumbar radiculopathy; L cervical radiculopathy   Other pertinent information DO NOT SEE PAST 06/02/2024   Quick Adds Self Referred   Self Referred   Self Referred (PT) Yes   MD order needed by: 6/3/2024   Progress Note/Certification   Start of Care Date 03/05/24   Onset of illness/injury or Date of Surgery 02/25/24   Therapy Frequency 1x/week   Predicted Duration 6 weeks   Progress Note Due Date 04/16/24   Progress Note Completed Date 03/05/24   GOALS   PT Goals 2   PT Goal 1   Goal Identifier turning neck   Goal Description Patient will demonstrate 100% cervical rotation AROM without pain   Rationale to maximize safety and independence with transportation   Goal Progress Continued 90% B cervical rotation, 1/10 stiffness.  Continue and extend timeframe to allow time for new stretch   Target Date 04/16/24   PT Goal 2   Goal Identifier sitting   Goal Description Patient will be able to sit for 1 hour without L posterior thigh numbness   Rationale to maximize safety and independence with transportation  (for work activities)   Goal Progress Has not had numbness with any activity for 2 weeks.   Target Date 04/16/24   Date Met 04/19/24   Subjective Report   Subjective Report Patient report   Objective Measures   Objective Measures Objective Measure 1;Objective Measure 2   Objective Measure 1   Objective Measure cervical rotation   Details cervical rotation B 90% with 1/10 stiffness noted each direction   Treatment Interventions (PT)   Interventions  Therapeutic Procedure/Exercise;Therapeutic Activity;Neuromuscular Re-education;Manual Therapy   Therapeutic Procedure/Exercise   Therapeutic Procedures: strength, endurance, ROM, flexibility minutes (46506) 17   Therapeutic Procedures Ther Proc 2;Ther Proc 3;Ther Proc 4;Ther Proc 5;Ther Proc 6;Ther Proc 7;Ther Proc 8;Ther Proc 9;Ther Proc 10   Ther Proc 1 B cerv rot w/retraction and self-OP   Ther Proc 1 - Details x8 reps each directions--decreased stiffness B rotation after--to try at home   Ther Proc 3 REIL   Ther Proc 3 - Details x10 reps, x5 reps--to try at home, 3-4x/day   Ther Proc 4 unilateral pressup L   Ther Proc 4 - Details x5 reps--moved back to regular REIL--patient instructed to go back to unilateral if any numbness in thigh returns   Ther Proc 5 cerv ret/ext   Ther Proc 5 - Details x10 reps, x5 reps hands on shoulders--Vc, MC, VSC for form and end range again--continue eveyr 2 hours   Ther Proc 6 cerv ret/ext/t-ext   Ther Proc 6 - Details x10 reps--continue every 2 hours, 10 reps   Skilled Intervention VC, MC, VSC for form and end range   Patient Response/Progress decreased B cervical rotation stiffness after exercises   Therapeutic Activity   Therapeutic Activities: dynamic activities to improve functional performance minutes (34467) 10   Ther Act 1 x10'   Ther Act 1 - Details Patient with questions regarding how she lays on the couch at night--2 pillows under head, often looking at phone--cervical flexion.  Discussed ways to change this position to keep neck in neutral and still allow comfort   Skilled Intervention education   Patient Response/Progress good understanding   Neuromuscular Re-education   Neuromuscular re-ed of mvmt, balance, coord, kinesthetic sense, posture, proprioception minutes (96538) 2   Neuro Re-ed 1 B ER w/TB   Neuro Re-ed 1 - Details yellow--review--pt likes but hasn't been doing often--will try more   Skilled Intervention review of technique and position   Patient  Response/Progress good understanding   Manual Therapy   Manual Therapy: Mobilization, MFR, MLD, friction massage minutes (67127) 10   Manual Therapy 1 x10 min   Manual Therapy 1 - Details C7-T9 ext mobs, gr I-III   Skilled Intervention hands-on technique   Patient Response/Progress decreased stiffness B rotation--cervical   Education   Learner/Method Patient;Listening;Demonstration;Pictures/Video;No Barriers to Learning   Plan   Plan for next session continue current POC   Total Session Time   Timed Code Treatment Minutes 39   Total Treatment Time (sum of timed and untimed services) 39

## 2024-10-23 ENCOUNTER — E-VISIT (OUTPATIENT)
Dept: URGENT CARE | Facility: CLINIC | Age: 54
End: 2024-10-23
Payer: COMMERCIAL

## 2024-10-23 DIAGNOSIS — B96.89 ACUTE BACTERIAL SINUSITIS: Primary | ICD-10-CM

## 2024-10-23 DIAGNOSIS — J01.90 ACUTE BACTERIAL SINUSITIS: Primary | ICD-10-CM

## 2024-10-23 PROCEDURE — 99421 OL DIG E/M SVC 5-10 MIN: CPT | Performed by: NURSE PRACTITIONER

## 2024-10-23 NOTE — PATIENT INSTRUCTIONS
Acute Sinusitis: Care Instructions  Overview     Acute sinusitis is an inflammation of the mucous membranes inside the nose and sinuses. Sinuses are the hollow spaces in your skull around the eyes and nose. Acute sinusitis often follows a cold. Acute sinusitis causes thick, discolored mucus that drains from the nose or down the back of the throat. It also can cause pain and pressure in your head and face along with a stuffy or blocked nose.  In most cases, sinusitis gets better on its own in 1 to 2 weeks. But some mild symptoms may last for several weeks. Sometimes antibiotics are needed if there is a bacterial infection.  Follow-up care is a key part of your treatment and safety. Be sure to make and go to all appointments, and call your doctor if you are having problems. It's also a good idea to know your test results and keep a list of the medicines you take.  How can you care for yourself at home?  Use saline (saltwater) nasal washes. This can help keep your nasal passages open and wash out mucus and allergens.  You can buy saline nose washes at a grocery store or drugstore. Follow the instructions on the package.  You can make your own at home. Add 1 teaspoon of non-iodized salt and 1 teaspoon of baking soda to 2 cups of distilled or boiled and cooled water. Fill a squeeze bottle or a nasal cleansing pot (such as a neti pot) with the nasal wash. Then put the tip into your nostril, and lean over the sink. With your mouth open, gently squirt the liquid. Repeat on the other side.  Try a decongestant nasal spray like oxymetazoline (Afrin). Do not use it for more than 3 days in a row. Using it for more than 3 days can make your congestion worse.  If needed, take an over-the-counter pain medicine, such as acetaminophen (Tylenol), ibuprofen (Advil, Motrin), or naproxen (Aleve). Read and follow all instructions on the label.  If the doctor prescribed antibiotics, take them as directed. Do not stop taking them just  "because you feel better. You need to take the full course of antibiotics.  Be careful when taking over-the-counter cold or flu medicines and Tylenol at the same time. Many of these medicines have acetaminophen, which is Tylenol. Read the labels to make sure that you are not taking more than the recommended dose. Too much acetaminophen (Tylenol) can be harmful.  Try a steroid nasal spray. It may help with your symptoms.  Breathe warm, moist air. You can use a steamy shower, a hot bath, or a sink filled with hot water. Avoid cold, dry air. Using a humidifier in your home may help. Follow the directions for cleaning the machine.  When should you call for help?   Call your doctor now or seek immediate medical care if:    You have new or worse swelling, redness, or pain in your face or around one or both of your eyes.     You have double vision or a change in your vision.     You have a high fever.     You have a severe headache and a stiff neck.     You have mental changes, such as feeling confused or much less alert.   Watch closely for changes in your health, and be sure to contact your doctor if:    You are not getting better as expected.   Where can you learn more?  Go to https://www.Share[employer].net/patiented  Enter I933 in the search box to learn more about \"Acute Sinusitis: Care Instructions.\"  Current as of: September 27, 2023  Content Version: 14.2 2024 [facility] [employer].   Care instructions adapted under license by your healthcare professional. If you have questions about a medical condition or this instruction, always ask your healthcare professional. [employer] disclaims any warranty or liability for your use of this information.    Dear Antoinette Deleon    After reviewing your responses, I've been able to diagnose you with Acute bacterial sinusitis.      Based on your responses and diagnosis, I have prescribed   Orders Placed This Encounter   Medications     amoxicillin-clavulanate " (AUGMENTIN) 875-125 MG tablet     Sig: Take 1 tablet by mouth 2 times daily for 7 days.     Dispense:  14 tablet     Refill:  0      to treat your symptoms. I have sent this to your pharmacy.?     It is also important to stay well hydrated, get lots of rest and take over-the-counter decongestants,?tylenol?or ibuprofen if you?are able to?take those medications per your primary care provider to help relieve discomfort.?     It is important that you take?all of?your prescribed medication even if your symptoms are improving after a few doses.? Taking?all of?your medicine helps prevent the symptoms from returning.?     If your symptoms worsen, you develop severe headache, vomiting, high fever (>102), or are not improving in 7 days, please contact your primary care provider for an appointment or visit any of our convenient Walk-in Care or Urgent Care Centers to be seen which can be found on our website?here.?     Thanks again for choosing?us?as your health care partner,?   ?  PHILOMENA KING CNP?

## 2024-10-30 ENCOUNTER — E-VISIT (OUTPATIENT)
Dept: URGENT CARE | Facility: CLINIC | Age: 54
End: 2024-10-30
Payer: COMMERCIAL

## 2024-10-30 DIAGNOSIS — J32.9 SINUSITIS, UNSPECIFIED CHRONICITY, UNSPECIFIED LOCATION: Primary | ICD-10-CM

## 2024-10-30 PROCEDURE — 99207 PR NON-BILLABLE SERV PER CHARTING: CPT | Performed by: FAMILY MEDICINE

## 2024-10-30 NOTE — PATIENT INSTRUCTIONS
Dear Antoinette,    We are sorry you are not feeling well. Based on the responses you provided, it is recommended that you be seen in-person in clinic so we can better evaluate your symptoms. Please schedule this visit in [employer]. You will have a Schedule Now button in [employer] to help with scheduling this appointment. Otherwise, you can call [phone] to schedule an appointment.     You will not be charged for this eVisit. Thank you for trusting us with your care.     If you are not improved after evisit than we recommend you be seen in person, sorry. Evisits are very limited.    Roosevelt Moss MD

## 2024-11-04 ENCOUNTER — OFFICE VISIT (OUTPATIENT)
Dept: DERMATOLOGY | Facility: CLINIC | Age: 54
End: 2024-11-04
Payer: COMMERCIAL

## 2024-11-04 DIAGNOSIS — D22.9 MULTIPLE BENIGN NEVI: ICD-10-CM

## 2024-11-04 DIAGNOSIS — L73.9 FOLLICULITIS: ICD-10-CM

## 2024-11-04 DIAGNOSIS — Z85.828 HISTORY OF NONMELANOMA SKIN CANCER: Primary | ICD-10-CM

## 2024-11-04 DIAGNOSIS — D18.01 CHERRY ANGIOMA: ICD-10-CM

## 2024-11-04 DIAGNOSIS — L82.1 SEBORRHEIC KERATOSES: ICD-10-CM

## 2024-11-04 DIAGNOSIS — L81.4 LENTIGINES: ICD-10-CM

## 2024-11-04 RX ORDER — PSYLLIUM HUSK 0.4 G
CAPSULE ORAL
COMMUNITY

## 2024-11-04 RX ORDER — MAGNESIUM GLYCINATE 100 MG
133 TABLET ORAL DAILY
COMMUNITY

## 2024-11-04 NOTE — PATIENT INSTRUCTIONS
Proper skin care from [facility]:    -Eliminate harsh soaps as they strip the natural oils from the skin, often resulting in dry itchy skin ( i.e. Dial, Zest, [place] Spring)  -Use mild soaps such as Cetaphil or Dove Sensitive Skin in the shower. You do not need to use soap on arms, legs, and trunk every time you shower unless visibly soiled.   -Avoid hot or cold showers.  -After showering, lightly dry off and apply moisturizing within 2-3 minutes. This will help trap moisture in the skin.   -Aggressive use of a moisturizer at least 1-2 times a day to the entire body (including -Vanicream, Cetaphil, Aquaphor or Cerave) and moisturize hands after every washing.  -We recommend using moisturizers that come in a tub that needs to be scooped out, not a pump. This has more of an oil base. It will hold moisture in your skin much better than a water base moisturizer. The above recommended are non-pore clogging.      Wear a sunscreen with at least SPF 30 on your face, ears, neck and V of the chest daily. Wear sunscreen on other areas of the body if those areas are exposed to the sun throughout the day. Sunscreens can contain physical and/or chemical blockers. Physical blockers are less likely to clog pores, these include zinc oxide and titanium dioxide. Reapply every two hour and after swimming.     Sunscreen examples: https://www.ewg.org/sunscreen/    UV radiation  UVA radiation remains constant throughout the day and throughout the year. It is a longer wavelength than UVB and therefore penetrates deeper into the skin leading to immediate and delayed tanning, photoaging, and skin cancer. 70-80% of UVA and UVB radiation occurs between the hours of 10am-2pm.  UVB radiation  UVB radiation causes the most harmful effects and is more significant during the summer months. However, snow and ice can reflect UVB radiation leading to skin damage during the winter months as well. UVB radiation is responsible for tanning,  burning, inflammation, delayed erythema (pinkness), pigmentation (brown spots), and skin cancer.     I recommend self monthly full body exams and yearly full body exams with a dermatology provider. If you develop a new or changing lesion please follow up for examination. Most skin cancers are pink and scaly or pink and pearly. However, we do see blue/brown/black skin cancers.  Consider the ABCDEs of melanoma when giving yourself your monthly full body exam ( don't forget the groin, buttocks, feet, toes, etc). A-asymmetry, B-borders, C-color, D-diameter, E-elevation or evolving. If you see any of these changes please follow up in clinic. If you cannot see your back I recommend purchasing a hand held mirror to use with a larger wall mirror.       Checking for Skin Cancer  You can find cancer early by checking your skin each month. There are 3 kinds of skin cancer. They are melanoma, basal cell carcinoma, and squamous cell carcinoma. Doing monthly skin checks is the best way to find new marks or skin changes. Follow the instructions below for checking your skin.   The ABCDEs of checking moles for melanoma   Check your moles or growths for signs of melanoma using ABCDE:   Asymmetry: the sides of the mole or growth don t match  Border: the edges are ragged, notched, or blurred  Color: the color within the mole or growth varies  Diameter: the mole or growth is larger than 6 mm (size of a pencil eraser)  Evolving: the size, shape, or color of the mole or growth is changing (evolving is not shown in the images below)    Checking for other types of skin cancer  Basal cell carcinoma or squamous cell carcinoma have symptoms such as:     A spot or mole that looks different from all other marks on your skin  Changes in how an area feels, such as itching, tenderness, or pain  Changes in the skin's surface, such as oozing, bleeding, or scaliness  A sore that does not heal  New swelling or redness beyond the border of a  mole    Who s at risk?  Anyone can get skin cancer. But you are at greater risk if you have:   Fair skin, light-colored hair, or light-colored eyes  Many moles or abnormal moles on your skin  A history of sunburns from sunlight or tanning beds  A family history of skin cancer  A history of exposure to radiation or chemicals  A weakened immune system  If you have had skin cancer in the past, you are at risk for recurring skin cancer.   How to check your skin  Do your monthly skin checkups in front of a full-length mirror. Check all parts of your body, including your:   Head (ears, face, neck, and scalp)  Torso (front, back, and sides)  Arms (tops, undersides, upper, and lower armpits)  Hands (palms, backs, and fingers, including under the nails)  Buttocks and genitals  Legs (front, back, and sides)  Feet (tops, soles, toes, including under the nails, and between toes)  If you have a lot of moles, take digital photos of them each month. Make sure to take photos both up close and from a distance. These can help you see if any moles change over time.   Most skin changes are not cancer. But if you see any changes in your skin, call your doctor right away. Only he or she can diagnose a problem. If you have skin cancer, seeing your doctor can be the first step toward getting the treatment that could save your life.   [employer] last reviewed this educational content on 4/1/2019 2000-2020 [employer]. [address] [place] [address], [place] [address]. All rights reserved. This information is not intended as a substitute for professional medical care. Always follow your healthcare professional's instructions.       When should I call my doctor?  If you are worsening or not improving, please, contact us or seek urgent care as noted below.     Who should I call with questions (adults)?    [facility] [phone]  For urgent needs outside of business hours call the [facility] at  [phone] and ask for the dermatology resident on call to be paged  If this is a medical emergency and you are unable to reach an ER, Call 911      If you need a prescription refill, please contact your pharmacy. Refills are approved or denied by our Physicians during normal business hours, Monday through Fridays  Per office policy, refills will not be granted if you have not been seen within the past year (or sooner depending on your child's condition)

## 2024-11-04 NOTE — LETTER
11/4/2024      Antoinette Deleon  [address]  [place] [address]      Dear Colleague,    Thank you for referring your patient, Anotinette Deleon, to the [facility]. Please see a copy of my visit note below.    [facility] Dermatology Note  Encounter Date: Nov 4, 2024  Office Visit     Dermatology Problem List:  FBSE: 5/9/24   1. AK s/p cryo   2. Hx of NMSC  - BCC, L chest, Bx 5/9/24 Tx: Imiquimod - great response  3. Folliculitis  - BPO 5% wash     Social: triathlete  ____________________________________________     Assessment & Plan:  # Hx BCC on L chest which she treated with imiquimod for 6 weeks.   - recheck today - no evidence of recurrence or residual lesion, excellent response  - Plan for next FBSE in 3mo     # Folliculitis, upper medial thighs - does not have today, would like to discuss prevention. Flares when training for triathlons.  - Discussed etiology, friction, tight clothing, sweat  - Start OTC BPO 2.5-5% cream or body wash few times weekly as prevention    Procedures Performed:   None.    Follow-up: 1 year(s) in-person, or earlier for new or changing lesions    Staff and Scribe:     Scribe Disclosure:   I, CARMINE ROBERTSON, am serving as a scribe; to document services personally performed by Lynn Soares PA-C-based on data collection and the provider's statements to me.  Provider Disclosure:   The documentation recorded by the scribe accurately reflects the services I personally performed and the decisions made by me.    All risks, benefits and alternatives were discussed with patient.  Patient is in agreement and understands the assessment and plan.  All questions were answered.    Lynn Soares PA-C, [facility] [[email or web address]]S  [facility] [place]: Phone: [phone], Fax: [phone]  [facility]: Phone: [phone],  Fax: [phone]  [facility]:  Phone: [phone], Fax: [phone]  ____________________________________________    CC: Skin Check (3mo for FBSE /No Concerns)    HPI:  Ms. Antoinette Deleon is a(n) 54 year old female who presents today as a return patient for recheck of a BCC following the use of imiquimod. She notes she finished the full course. Additionally she mentions to me that when she is training for her triathlons, she often gets bumpy acne like rashes on her buttocks and thighs. She does not have this today, but is wondering if there are things that can be done to help prevent this int he future. Patient is otherwise feeling well, without additional skin concerns.    Labs Reviewed:  N/A    Physical Exam:  Vitals: There were no vitals taken for this visit.  SKIN: Full skin, which includes the head/face, both arms, chest, back, abdomen,both legs, genitalia and/or groin buttocks, digits and/or nails, was examined.  - BCC site well-healed scars, no evidence of recurrence  - No other lesions of concern on areas examined.     Medications:  Current Outpatient Medications   Medication Sig Dispense Refill     Ascorbic Acid (VITAMIN C) 100 MG CHEW        cholecalciferol (VITAMIN D) 1000 UNIT tablet Take 1,000 Units by mouth daily        estradiol (ESTRACE) 1 MG tablet Take 1 tablet by mouth daily       levothyroxine (SYNTHROID/LEVOTHROID) 125 MCG tablet Take 125 mcg by mouth daily Dose reduced to 125 mcg per Endocrinology       liothyronine (CYTOMEL) 5 MCG tablet Take 5 mcg by mouth daily Dose reduced to 5 mcg daily.       multivitamin w/minerals (THERA-VIT-M) tablet Take 1 tablet by mouth daily        ondansetron (ZOFRAN) 4 MG tablet TAKE 1 TABLET(4 MG) BY MOUTH EVERY 8 HOURS AS NEEDED FOR NAUSEA OR MIGRAINES Strength: 4 mg 30 tablet 4     progesterone (PROMETRIUM) 100 MG capsule Take 100 mg by mouth at bedtime       SUMAtriptan (IMITREX) 25 MG tablet TAKE 1 TO 2 TABLETS(25 TO 50 MG) BY MOUTH AT ONSET OF HEADACHE FOR MIGRAINE. MAY REPEAT  IN 2 HOURS. MAX 8 TABLETS IN 24 HOURS 18 tablet 3     azelastine (OPTIVAR) 0.05 % ophthalmic solution Place 1 drop into both eyes 2 times daily as needed (for itchy eyes) 6 mL 11     imiquimod (ALDARA) 5 % external cream Apply topically to the left chest Monday-Friday for 6 consecutive weeks. Wash hands after application. (Patient not taking: Reported on 7/29/2024) 8 packet 1     Magnesium Bisglycinate (MAG GLYCINATE) 100 MG TABS Take 133 mg by mouth daily.       Omega-3 Fatty Acids (FISH OIL) 360 MG CAPS Take by mouth.       No current facility-administered medications for this visit.      Past Medical History:   Patient Active Problem List   Diagnosis     Hypothyroidism     Migraine with aura     Cervical high risk HPV (human papillomavirus) test positive     Dysfunctional uterine bleeding     DUB (dysfunctional uterine bleeding)     Pain of left forearm     Past Medical History:   Diagnosis Date     Cervical high risk HPV (human papillomavirus) test positive 11/28/2018     Menorrhagia      Thyroid disease         CC Referred Self, MD  No address on file on close of this encounter.      Again, thank you for allowing me to participate in the care of your patient.        Sincerely,        Lynn Soares PA-C

## 2024-11-04 NOTE — PROGRESS NOTES
[facility] Dermatology Note  Encounter Date: Nov 4, 2024  Office Visit     Dermatology Problem List:  FBSE: 5/9/24   1. AK s/p cryo   2. Hx of NMSC  - BCC, L chest, Bx 5/9/24 Tx: Imiquimod - great response  3. Folliculitis  - BPO 5% wash     Social: triathlete  ____________________________________________     Assessment & Plan:  # Hx BCC on L chest which she treated with imiquimod for 6 weeks.   - recheck today - no evidence of recurrence or residual lesion, excellent response  - Plan for next FBSE in 3mo     # Folliculitis, upper medial thighs - does not have today, would like to discuss prevention. Flares when training for triathlons.  - Discussed etiology, friction, tight clothing, sweat  - Start OTC BPO 2.5-5% cream or body wash few times weekly as prevention    Procedures Performed:   None.    Follow-up: 1 year(s) in-person, or earlier for new or changing lesions    Staff and Scribe:     Scribe Disclosure:   I, CARMINE ROBERTSON, am serving as a scribe; to document services personally performed by Lynn Soares PA-C-based on data collection and the provider's statements to me.  Provider Disclosure:   The documentation recorded by the scribe accurately reflects the services I personally performed and the decisions made by me.    All risks, benefits and alternatives were discussed with patient.  Patient is in agreement and understands the assessment and plan.  All questions were answered.    Lynn Soares PA-C, [facility]  [facility] [place]: Phone: [phone], Fax: [phone]  [facility]: Phone: [phone],  Fax: [phone]  [facility]: Phone: [phone], Fax: [phone]  ____________________________________________    CC: Skin Check (3mo for FBSE /No Concerns)    HPI:  Ms. Antoinette GRACIA Pancho is a(n) 54 year old female who presents today as a return patient for recheck of a BCC  following the use of imiquimod. She notes she finished the full course. Additionally she mentions to me that when she is training for her triathlons, she often gets bumpy acne like rashes on her buttocks and thighs. She does not have this today, but is wondering if there are things that can be done to help prevent this int he future. Patient is otherwise feeling well, without additional skin concerns.    Labs Reviewed:  N/A    Physical Exam:  Vitals: There were no vitals taken for this visit.  SKIN: Full skin, which includes the head/face, both arms, chest, back, abdomen,both legs, genitalia and/or groin buttocks, digits and/or nails, was examined.  - BCC site well-healed scars, no evidence of recurrence  - No other lesions of concern on areas examined.     Medications:  Current Outpatient Medications   Medication Sig Dispense Refill    Ascorbic Acid (VITAMIN C) 100 MG CHEW       cholecalciferol (VITAMIN D) 1000 UNIT tablet Take 1,000 Units by mouth daily       estradiol (ESTRACE) 1 MG tablet Take 1 tablet by mouth daily      levothyroxine (SYNTHROID/LEVOTHROID) 125 MCG tablet Take 125 mcg by mouth daily Dose reduced to 125 mcg per Endocrinology      liothyronine (CYTOMEL) 5 MCG tablet Take 5 mcg by mouth daily Dose reduced to 5 mcg daily.      multivitamin w/minerals (THERA-VIT-M) tablet Take 1 tablet by mouth daily       ondansetron (ZOFRAN) 4 MG tablet TAKE 1 TABLET(4 MG) BY MOUTH EVERY 8 HOURS AS NEEDED FOR NAUSEA OR MIGRAINES Strength: 4 mg 30 tablet 4    progesterone (PROMETRIUM) 100 MG capsule Take 100 mg by mouth at bedtime      SUMAtriptan (IMITREX) 25 MG tablet TAKE 1 TO 2 TABLETS(25 TO 50 MG) BY MOUTH AT ONSET OF HEADACHE FOR MIGRAINE. MAY REPEAT IN 2 HOURS. MAX 8 TABLETS IN 24 HOURS 18 tablet 3    azelastine (OPTIVAR) 0.05 % ophthalmic solution Place 1 drop into both eyes 2 times daily as needed (for itchy eyes) 6 mL 11    imiquimod (ALDARA) 5 % external cream Apply topically to the left chest  Monday-Friday for 6 consecutive weeks. Wash hands after application. (Patient not taking: Reported on 7/29/2024) 8 packet 1    Magnesium Bisglycinate (MAG GLYCINATE) 100 MG TABS Take 133 mg by mouth daily.      Omega-3 Fatty Acids (FISH OIL) 360 MG CAPS Take by mouth.       No current facility-administered medications for this visit.      Past Medical History:   Patient Active Problem List   Diagnosis    Hypothyroidism    Migraine with aura    Cervical high risk HPV (human papillomavirus) test positive    Dysfunctional uterine bleeding    DUB (dysfunctional uterine bleeding)    Pain of left forearm     Past Medical History:   Diagnosis Date    Cervical high risk HPV (human papillomavirus) test positive 11/28/2018    Menorrhagia     Thyroid disease         CC Referred Self, MD  No address on file on close of this encounter.

## 2024-11-07 ENCOUNTER — OFFICE VISIT (OUTPATIENT)
Dept: URGENT CARE | Facility: URGENT CARE | Age: 54
End: 2024-11-07
Payer: COMMERCIAL

## 2024-11-07 VITALS
SYSTOLIC BLOOD PRESSURE: 119 MMHG | WEIGHT: 222 LBS | BODY MASS INDEX: 31.4 KG/M2 | TEMPERATURE: 97.3 F | HEART RATE: 87 BPM | DIASTOLIC BLOOD PRESSURE: 82 MMHG | OXYGEN SATURATION: 97 %

## 2024-11-07 DIAGNOSIS — J01.01 ACUTE RECURRENT MAXILLARY SINUSITIS: Primary | ICD-10-CM

## 2024-11-07 PROCEDURE — 99213 OFFICE O/P EST LOW 20 MIN: CPT | Performed by: NURSE PRACTITIONER

## 2024-11-07 RX ORDER — DOXYCYCLINE 100 MG/1
100 CAPSULE ORAL 2 TIMES DAILY
Qty: 28 CAPSULE | Refills: 0 | Status: SHIPPED | OUTPATIENT
Start: 2024-11-07 | End: 2024-11-21

## 2024-11-28 ENCOUNTER — PATIENT OUTREACH (OUTPATIENT)
Dept: CARE COORDINATION | Facility: CLINIC | Age: 54
End: 2024-11-28
Payer: COMMERCIAL

## 2024-12-26 ENCOUNTER — PATIENT OUTREACH (OUTPATIENT)
Dept: CARE COORDINATION | Facility: CLINIC | Age: 54
End: 2024-12-26
Payer: COMMERCIAL

## 2025-02-11 ENCOUNTER — OFFICE VISIT (OUTPATIENT)
Dept: FAMILY MEDICINE | Facility: CLINIC | Age: 55
End: 2025-02-11
Payer: COMMERCIAL

## 2025-02-11 VITALS
BODY MASS INDEX: 32.07 KG/M2 | TEMPERATURE: 97.3 F | WEIGHT: 224 LBS | DIASTOLIC BLOOD PRESSURE: 74 MMHG | HEIGHT: 70 IN | SYSTOLIC BLOOD PRESSURE: 118 MMHG | OXYGEN SATURATION: 99 % | RESPIRATION RATE: 14 BRPM | HEART RATE: 84 BPM

## 2025-02-11 DIAGNOSIS — R41.89 BRAIN FOG: Primary | ICD-10-CM

## 2025-02-11 DIAGNOSIS — J18.9 PNEUMONIA OF RIGHT LUNG DUE TO INFECTIOUS ORGANISM, UNSPECIFIED PART OF LUNG: ICD-10-CM

## 2025-02-11 DIAGNOSIS — G43.109 MIGRAINE WITH AURA AND WITHOUT STATUS MIGRAINOSUS, NOT INTRACTABLE: ICD-10-CM

## 2025-02-11 LAB
BASOPHILS # BLD AUTO: 0 10E3/UL (ref 0–0.2)
BASOPHILS NFR BLD AUTO: 0 %
EOSINOPHIL # BLD AUTO: 0.1 10E3/UL (ref 0–0.7)
EOSINOPHIL NFR BLD AUTO: 1 %
ERYTHROCYTE [DISTWIDTH] IN BLOOD BY AUTOMATED COUNT: 13.6 % (ref 10–15)
HCT VFR BLD AUTO: 41.1 % (ref 35–47)
HGB BLD-MCNC: 13.9 G/DL (ref 11.7–15.7)
IMM GRANULOCYTES # BLD: 0 10E3/UL
IMM GRANULOCYTES NFR BLD: 0 %
LYMPHOCYTES # BLD AUTO: 2.6 10E3/UL (ref 0.8–5.3)
LYMPHOCYTES NFR BLD AUTO: 33 %
MCH RBC QN AUTO: 28.6 PG (ref 26.5–33)
MCHC RBC AUTO-ENTMCNC: 33.8 G/DL (ref 31.5–36.5)
MCV RBC AUTO: 85 FL (ref 78–100)
MONOCYTES # BLD AUTO: 0.6 10E3/UL (ref 0–1.3)
MONOCYTES NFR BLD AUTO: 7 %
NEUTROPHILS # BLD AUTO: 4.6 10E3/UL (ref 1.6–8.3)
NEUTROPHILS NFR BLD AUTO: 59 %
PLATELET # BLD AUTO: 209 10E3/UL (ref 150–450)
RBC # BLD AUTO: 4.86 10E6/UL (ref 3.8–5.2)
WBC # BLD AUTO: 7.8 10E3/UL (ref 4–11)

## 2025-02-11 PROCEDURE — 82728 ASSAY OF FERRITIN: CPT | Performed by: PHYSICIAN ASSISTANT

## 2025-02-11 PROCEDURE — 36415 COLL VENOUS BLD VENIPUNCTURE: CPT | Performed by: PHYSICIAN ASSISTANT

## 2025-02-11 PROCEDURE — 82607 VITAMIN B-12: CPT | Performed by: PHYSICIAN ASSISTANT

## 2025-02-11 PROCEDURE — 83540 ASSAY OF IRON: CPT | Performed by: PHYSICIAN ASSISTANT

## 2025-02-11 PROCEDURE — 99214 OFFICE O/P EST MOD 30 MIN: CPT | Performed by: PHYSICIAN ASSISTANT

## 2025-02-11 PROCEDURE — 85025 COMPLETE CBC W/AUTO DIFF WBC: CPT | Performed by: PHYSICIAN ASSISTANT

## 2025-02-11 PROCEDURE — 83550 IRON BINDING TEST: CPT | Performed by: PHYSICIAN ASSISTANT

## 2025-02-11 RX ORDER — LANOLIN ALCOHOL/MO/W.PET/CERES
100 CREAM (GRAM) TOPICAL DAILY
COMMUNITY

## 2025-02-11 RX ORDER — SUMATRIPTAN SUCCINATE 25 MG/1
TABLET ORAL
Qty: 18 TABLET | Refills: 3 | Status: SHIPPED | OUTPATIENT
Start: 2025-02-11

## 2025-02-11 RX ORDER — ONDANSETRON 4 MG/1
TABLET, FILM COATED ORAL
Qty: 30 TABLET | Refills: 4 | Status: SHIPPED | OUTPATIENT
Start: 2025-02-11

## 2025-02-11 RX ORDER — ZINC GLUCONATE 50 MG
50 TABLET ORAL DAILY
COMMUNITY

## 2025-02-11 ASSESSMENT — PAIN SCALES - GENERAL: PAINLEVEL_OUTOF10: NO PAIN (0)

## 2025-02-11 NOTE — PROGRESS NOTES
"  Assessment & Plan     Migraine with aura and without status migrainosus, not intractable  Medication tolerant. No changes to regimen.   - SUMAtriptan (IMITREX) 25 MG tablet; TAKE 1 TO 2 TABLETS(25 TO 50 MG) BY MOUTH AT ONSET OF HEADACHE FOR MIGRAINE. MAY REPEAT IN 2 HOURS. MAX 8 TABLETS IN 24 HOURS  - ondansetron (ZOFRAN) 4 MG tablet; TAKE 1 TABLET(4 MG) BY MOUTH EVERY 8 HOURS AS NEEDED FOR NAUSEA OR MIGRAINES Strength: 4 mg    Brain fog  Patient has ongoing concerns of brain fog and fatigue. Discussed with patient that this may be due to several reasons such post-menopausal changes, vitamin deficiencies and possibly due to stress at work or care taker fatigue. Discussed that today we will do the following labs below. If labs are normal, recommend referral for an MRI. Patient does not currently exhibit any neuro deficits to warrant a MRI at this time. Discussed other alternatives such as starting an SNRI to help manage these concerns.     - Ferritin; Future  - CBC with platelets and differential; Future  - Vitamin B12; Future  - Iron and iron binding capacity; Future  - Ferritin  - CBC with platelets and differential  - Vitamin B12  - Iron and iron binding capacity      3. Pneumonia of right lung due to infectious organism, unspecified part of lung  Resolved.  No further assessment needed      BMI  Estimated body mass index is 32.14 kg/m  as calculated from the following:    Height as of this encounter: 1.778 m (5' 10\").    Weight as of this encounter: 101.6 kg (224 lb).         Follow up as above    Dwayne Curry is a 54 year old, presenting for the following health issues:  Follow Up (Pneumonia)        2/11/2025    10:05 AM   Additional Questions   Roomed by Nasrin BARKER       Via the Health Maintenance questionnaire, the patient has reported the following services have been completed -Mammogram: [facility] MAMMOGRAM 2024-07-31, this information has been sent to the abstraction team.  History of Present " "Illness       Reason for visit:  Walking pneumonia    She eats 4 or more servings of fruits and vegetables daily.She consumes 0 sweetened beverage(s) daily.She exercises with enough effort to increase her heart rate 30 to 60 minutes per day.  She exercises with enough effort to increase her heart rate 3 or less days per week.   She is taking medications regularly.    Patient was advised to follow up after pneumonia 12/2024.    Patient reports that her symptoms resolved after completing her regimen for pneumonia. She denies any residual symptoms and has transitions to her daily activities. Patient reports that she had several respiratory illness back to back before her pneumonia such as COVID and Sinusitis.     Patient reports that she has been having ongoing brain fog and has been following up with multiple to endocrinologist and her OB to figure out if her symptoms are due to her history of hypothyroid or post menopausal. Patient reports ongoing brain fog that has been impacting her ability to work. Patient works in "[employer] and feels like she has been struggling to complete work related tasks due tot he brain fog and fatigue.            Review of Systems  Constitutional, HEENT, cardiovascular, pulmonary, gi and gu systems are negative, except as otherwise noted in HPI.      Objective    /74 (BP Location: Left arm, Patient Position: Sitting, Cuff Size: Adult Large)   Pulse 84   Temp 97.3  F (36.3  C) (Tympanic)   Resp 14   Ht 1.778 m (5' 10\")   Wt 101.6 kg (224 lb)   SpO2 99%   BMI 32.14 kg/m    Body mass index is 32.14 kg/m .  Physical Exam   GENERAL: alert and no distress  NECK: no adenopathy, no asymmetry, masses, or scars  RESP: lungs clear to auscultation - no rales, rhonchi or wheezes  CV: regular rate and rhythm, normal S1 S2, no S3 or S4, no murmur, click or rub, no peripheral edema  ABDOMEN: soft, nontender, no hepatosplenomegaly, no masses and bowel sounds normal            Signed " Electronically by: Florian Garner PA-C

## 2025-02-12 DIAGNOSIS — R41.89 BRAIN FOG: Primary | ICD-10-CM

## 2025-02-12 LAB
FERRITIN SERPL-MCNC: 121 NG/ML (ref 11–328)
IRON BINDING CAPACITY (ROCHE): 342 UG/DL (ref 240–430)
IRON SATN MFR SERPL: 18 % (ref 15–46)
IRON SERPL-MCNC: 62 UG/DL (ref 37–145)
VIT B12 SERPL-MCNC: 598 PG/ML (ref 232–1245)

## 2025-02-22 ENCOUNTER — HOSPITAL ENCOUNTER (OUTPATIENT)
Dept: MRI IMAGING | Facility: CLINIC | Age: 55
Discharge: HOME | End: 2025-02-22
Attending: PHYSICIAN ASSISTANT | Admitting: PHYSICIAN ASSISTANT
Payer: COMMERCIAL

## 2025-02-22 DIAGNOSIS — R41.89 BRAIN FOG: ICD-10-CM

## 2025-02-22 PROCEDURE — 70551 MRI BRAIN STEM W/O DYE: CPT

## 2025-02-25 ENCOUNTER — TRANSFERRED RECORDS (OUTPATIENT)
Dept: HEALTH INFORMATION MANAGEMENT | Facility: CLINIC | Age: 55
End: 2025-02-25
Payer: COMMERCIAL

## 2025-02-28 NOTE — RESULT ENCOUNTER NOTE
Antoinette-  Here are your recent results.     Your MRI is normal and does not show any cause for your ongoing symptoms    Florian Garner PA-C

## 2025-07-01 ENCOUNTER — PATIENT OUTREACH (OUTPATIENT)
Dept: CARE COORDINATION | Facility: CLINIC | Age: 55
End: 2025-07-01
Payer: COMMERCIAL

## 2025-07-07 ENCOUNTER — MYC MEDICAL ADVICE (OUTPATIENT)
Dept: FAMILY MEDICINE | Facility: CLINIC | Age: 55
End: 2025-07-07
Payer: COMMERCIAL

## 2025-07-29 ENCOUNTER — PATIENT OUTREACH (OUTPATIENT)
Dept: CARE COORDINATION | Facility: CLINIC | Age: 55
End: 2025-07-29
Payer: COMMERCIAL

## 2025-08-20 ENCOUNTER — MYC MEDICAL ADVICE (OUTPATIENT)
Dept: FAMILY MEDICINE | Facility: CLINIC | Age: 55
End: 2025-08-20
Payer: COMMERCIAL

## 2025-08-20 DIAGNOSIS — R41.89 BRAIN FOG: Primary | ICD-10-CM

## 2025-08-26 RX ORDER — DESVENLAFAXINE 50 MG/1
50 TABLET, FILM COATED, EXTENDED RELEASE ORAL DAILY
Qty: 90 TABLET | Refills: 1 | Status: SHIPPED | OUTPATIENT
Start: 2025-08-26

## (undated) DEVICE — TRAY PARACERVICAL BLOCK LATEX FREE

## (undated) DEVICE — GLOVE PROTEXIS POWDER FREE SMT 7.5  2D72PT75X

## (undated) DEVICE — TUBING SUCTION 12"X1/4" N612

## (undated) DEVICE — ESU ELEC BIPOLAR SPRING TIP VERSAPOINT 5FR WA47783A

## (undated) DEVICE — TUBING IRRIG TUR Y TYPE 96" LF 6543-01

## (undated) DEVICE — SUCTION CANISTER MEDIVAC LINER 3000ML W/LID 65651-530

## (undated) DEVICE — SOL WATER IRRIG 1000ML BOTTLE 2F7114

## (undated) DEVICE — SOL NACL 0.9% IRRIG 1000ML BOTTLE 2F7124

## (undated) DEVICE — GLOVE PROTEXIS MICRO 7.5  2D73PM75

## (undated) DEVICE — SOL NACL 0.9% INJ 1000ML BAG 2B1324X

## (undated) DEVICE — PACK TVT HYSTEROSCOPY SMA15HYFSE

## (undated) DEVICE — GOWN IMPERVIOUS SPECIALTY XLG/XLONG 32474

## (undated) DEVICE — CATH INTERMITTENT CLEAN-CATH FEMALE 14FR 6" VINYL LF 420614

## (undated) DEVICE — LINEN TOWEL PACK X5 5464

## (undated) RX ORDER — LIDOCAINE HYDROCHLORIDE 20 MG/ML
INJECTION, SOLUTION EPIDURAL; INFILTRATION; INTRACAUDAL; PERINEURAL
Status: DISPENSED
Start: 2020-02-05

## (undated) RX ORDER — PHENAZOPYRIDINE HYDROCHLORIDE 200 MG/1
TABLET, FILM COATED ORAL
Status: DISPENSED
Start: 2020-12-08

## (undated) RX ORDER — DEXAMETHASONE SODIUM PHOSPHATE 4 MG/ML
INJECTION, SOLUTION INTRA-ARTICULAR; INTRALESIONAL; INTRAMUSCULAR; INTRAVENOUS; SOFT TISSUE
Status: DISPENSED
Start: 2020-02-05

## (undated) RX ORDER — KETOROLAC TROMETHAMINE 30 MG/ML
INJECTION, SOLUTION INTRAMUSCULAR; INTRAVENOUS
Status: DISPENSED
Start: 2020-12-08

## (undated) RX ORDER — FENTANYL CITRATE 50 UG/ML
INJECTION, SOLUTION INTRAMUSCULAR; INTRAVENOUS
Status: DISPENSED
Start: 2020-12-08

## (undated) RX ORDER — FENTANYL CITRATE 50 UG/ML
INJECTION, SOLUTION INTRAMUSCULAR; INTRAVENOUS
Status: DISPENSED
Start: 2021-09-17

## (undated) RX ORDER — FENTANYL CITRATE 50 UG/ML
INJECTION, SOLUTION INTRAMUSCULAR; INTRAVENOUS
Status: DISPENSED
Start: 2020-02-05

## (undated) RX ORDER — ONDANSETRON 2 MG/ML
INJECTION INTRAMUSCULAR; INTRAVENOUS
Status: DISPENSED
Start: 2020-02-05

## (undated) RX ORDER — DEXAMETHASONE SODIUM PHOSPHATE 4 MG/ML
INJECTION, SOLUTION INTRA-ARTICULAR; INTRALESIONAL; INTRAMUSCULAR; INTRAVENOUS; SOFT TISSUE
Status: DISPENSED
Start: 2020-12-08

## (undated) RX ORDER — ONDANSETRON 2 MG/ML
INJECTION INTRAMUSCULAR; INTRAVENOUS
Status: DISPENSED
Start: 2020-12-08

## (undated) RX ORDER — PROPOFOL 10 MG/ML
INJECTION, EMULSION INTRAVENOUS
Status: DISPENSED
Start: 2020-12-08

## (undated) RX ORDER — ACETAMINOPHEN 325 MG/1
TABLET ORAL
Status: DISPENSED
Start: 2020-02-05

## (undated) RX ORDER — ONDANSETRON 2 MG/ML
INJECTION INTRAMUSCULAR; INTRAVENOUS
Status: DISPENSED
Start: 2021-09-17

## (undated) RX ORDER — HYDROCODONE BITARTRATE AND ACETAMINOPHEN 5; 325 MG/1; MG/1
TABLET ORAL
Status: DISPENSED
Start: 2020-12-08

## (undated) RX ORDER — ACETAMINOPHEN 500 MG
TABLET ORAL
Status: DISPENSED
Start: 2020-12-08

## (undated) RX ORDER — PROPOFOL 10 MG/ML
INJECTION, EMULSION INTRAVENOUS
Status: DISPENSED
Start: 2020-02-05

## (undated) RX ORDER — LIDOCAINE HYDROCHLORIDE 20 MG/ML
INJECTION, SOLUTION EPIDURAL; INFILTRATION; INTRACAUDAL; PERINEURAL
Status: DISPENSED
Start: 2020-12-08